# Patient Record
Sex: FEMALE | Race: WHITE | Employment: OTHER | URBAN - METROPOLITAN AREA
[De-identification: names, ages, dates, MRNs, and addresses within clinical notes are randomized per-mention and may not be internally consistent; named-entity substitution may affect disease eponyms.]

---

## 2017-01-24 ENCOUNTER — HOSPITAL ENCOUNTER (OUTPATIENT)
Dept: MAMMOGRAPHY | Age: 62
Discharge: HOME | End: 2017-01-24
Attending: FAMILY MEDICINE | Admitting: FAMILY MEDICINE

## 2017-01-24 DIAGNOSIS — Z12.31 ENCOUNTER FOR SCREENING MAMMOGRAM FOR BREAST CANCER: ICD-10-CM

## 2017-05-02 ENCOUNTER — OFFICE VISIT (OUTPATIENT)
Dept: FAMILY MEDICINE CLINIC | Age: 62
End: 2017-05-02

## 2017-05-02 VITALS
WEIGHT: 169 LBS | HEART RATE: 84 BPM | OXYGEN SATURATION: 97 % | BODY MASS INDEX: 27.16 KG/M2 | HEIGHT: 66 IN | RESPIRATION RATE: 16 BRPM | DIASTOLIC BLOOD PRESSURE: 80 MMHG | TEMPERATURE: 99.6 F | SYSTOLIC BLOOD PRESSURE: 122 MMHG

## 2017-05-02 DIAGNOSIS — F41.9 ANXIETY: Primary | ICD-10-CM

## 2017-05-02 DIAGNOSIS — J30.5 ALLERGIC RHINITIS DUE TO FOOD, UNSPECIFIED RHINITIS SEASONALITY: ICD-10-CM

## 2017-05-02 DIAGNOSIS — I10 ESSENTIAL HYPERTENSION: ICD-10-CM

## 2017-05-02 DIAGNOSIS — G89.29 CHRONIC EAR PAIN, BILATERAL: ICD-10-CM

## 2017-05-02 DIAGNOSIS — M54.50 CHRONIC MIDLINE LOW BACK PAIN WITHOUT SCIATICA: ICD-10-CM

## 2017-05-02 DIAGNOSIS — H92.03 CHRONIC EAR PAIN, BILATERAL: ICD-10-CM

## 2017-05-02 DIAGNOSIS — E78.2 MIXED HYPERLIPIDEMIA: ICD-10-CM

## 2017-05-02 DIAGNOSIS — L30.9 DERMATITIS: ICD-10-CM

## 2017-05-02 DIAGNOSIS — G89.29 CHRONIC MIDLINE LOW BACK PAIN WITHOUT SCIATICA: ICD-10-CM

## 2017-05-02 PROCEDURE — 99214 OFFICE O/P EST MOD 30 MIN: CPT | Performed by: FAMILY MEDICINE

## 2017-05-02 RX ORDER — IBUPROFEN 800 MG/1
TABLET ORAL
Qty: 180 TABLET | Refills: 0 | Status: SHIPPED | OUTPATIENT
Start: 2017-05-02 | End: 2017-09-20 | Stop reason: SDUPTHER

## 2017-05-02 RX ORDER — MONTELUKAST SODIUM 10 MG/1
TABLET ORAL
Qty: 90 TABLET | Refills: 1 | Status: SHIPPED | OUTPATIENT
Start: 2017-05-02 | End: 2017-08-28 | Stop reason: SDUPTHER

## 2017-05-02 RX ORDER — LOVASTATIN 40 MG/1
TABLET ORAL
Qty: 90 TABLET | Refills: 1 | Status: SHIPPED | OUTPATIENT
Start: 2017-05-02 | End: 2017-09-26 | Stop reason: SDUPTHER

## 2017-05-02 RX ORDER — LORAZEPAM 0.5 MG/1
0.5 TABLET ORAL 2 TIMES DAILY PRN
Qty: 30 TABLET | Refills: 0 | Status: SHIPPED | OUTPATIENT
Start: 2017-05-02 | End: 2017-09-26 | Stop reason: SDUPTHER

## 2017-05-02 ASSESSMENT — ENCOUNTER SYMPTOMS
RESPIRATORY NEGATIVE: 1
EYES NEGATIVE: 1
BACK PAIN: 1
ALLERGIC/IMMUNOLOGIC NEGATIVE: 1
GASTROINTESTINAL NEGATIVE: 1

## 2017-05-02 ASSESSMENT — PATIENT HEALTH QUESTIONNAIRE - PHQ9
SUM OF ALL RESPONSES TO PHQ QUESTIONS 1-9: 0
2. FEELING DOWN, DEPRESSED OR HOPELESS: 0
1. LITTLE INTEREST OR PLEASURE IN DOING THINGS: 0
SUM OF ALL RESPONSES TO PHQ9 QUESTIONS 1 & 2: 0

## 2017-06-05 DIAGNOSIS — I10 ESSENTIAL HYPERTENSION: ICD-10-CM

## 2017-06-05 RX ORDER — LOSARTAN POTASSIUM 100 MG/1
TABLET ORAL
Qty: 90 TABLET | Refills: 1 | Status: SHIPPED | OUTPATIENT
Start: 2017-06-05 | End: 2017-09-26 | Stop reason: SDUPTHER

## 2017-07-26 ENCOUNTER — OFFICE VISIT (OUTPATIENT)
Dept: FAMILY MEDICINE CLINIC | Age: 62
End: 2017-07-26

## 2017-07-26 VITALS
WEIGHT: 167 LBS | HEIGHT: 66 IN | DIASTOLIC BLOOD PRESSURE: 74 MMHG | HEART RATE: 78 BPM | SYSTOLIC BLOOD PRESSURE: 116 MMHG | RESPIRATION RATE: 16 BRPM | TEMPERATURE: 98.4 F | OXYGEN SATURATION: 97 % | BODY MASS INDEX: 26.84 KG/M2

## 2017-07-26 DIAGNOSIS — Z11.59 NEED FOR HEPATITIS C SCREENING TEST: ICD-10-CM

## 2017-07-26 DIAGNOSIS — Z00.00 PE (PHYSICAL EXAM), ANNUAL: Primary | ICD-10-CM

## 2017-07-26 DIAGNOSIS — Z11.4 SCREENING FOR HIV (HUMAN IMMUNODEFICIENCY VIRUS): ICD-10-CM

## 2017-07-26 LAB
A/G RATIO: 2.4 (ref 1.1–2.2)
ALBUMIN SERPL-MCNC: 4.8 G/DL (ref 3.4–5)
ALP BLD-CCNC: 42 U/L (ref 40–129)
ALT SERPL-CCNC: 38 U/L (ref 10–40)
ANION GAP SERPL CALCULATED.3IONS-SCNC: 15 MMOL/L (ref 3–16)
AST SERPL-CCNC: 22 U/L (ref 15–37)
BILIRUB SERPL-MCNC: 0.5 MG/DL (ref 0–1)
BILIRUBIN, POC: NORMAL
BLOOD URINE, POC: NORMAL
BUN BLDV-MCNC: 16 MG/DL (ref 7–20)
CALCIUM SERPL-MCNC: 10 MG/DL (ref 8.3–10.6)
CHLORIDE BLD-SCNC: 102 MMOL/L (ref 99–110)
CHOLESTEROL, TOTAL: 191 MG/DL (ref 0–199)
CLARITY, POC: CLEAR
CO2: 24 MMOL/L (ref 21–32)
COLOR, POC: YELLOW
CREAT SERPL-MCNC: 0.6 MG/DL (ref 0.6–1.2)
GFR AFRICAN AMERICAN: >60
GFR NON-AFRICAN AMERICAN: >60
GLOBULIN: 2 G/DL
GLUCOSE BLD-MCNC: 99 MG/DL (ref 70–99)
GLUCOSE URINE, POC: NORMAL
HCT VFR BLD CALC: 42.2 % (ref 36–48)
HDLC SERPL-MCNC: 49 MG/DL (ref 40–60)
HEMOGLOBIN: 14.2 G/DL (ref 12–16)
HEPATITIS C ANTIBODY INTERPRETATION: NORMAL
KETONES, POC: NORMAL
LDL CHOLESTEROL CALCULATED: 108 MG/DL
LEUKOCYTE EST, POC: NORMAL
MCH RBC QN AUTO: 31.6 PG (ref 26–34)
MCHC RBC AUTO-ENTMCNC: 33.5 G/DL (ref 31–36)
MCV RBC AUTO: 94.2 FL (ref 80–100)
NITRITE, POC: NORMAL
PDW BLD-RTO: 13.3 % (ref 12.4–15.4)
PH, POC: 7
PLATELET # BLD: 145 K/UL (ref 135–450)
PMV BLD AUTO: 10.2 FL (ref 5–10.5)
POTASSIUM SERPL-SCNC: 4.6 MMOL/L (ref 3.5–5.1)
PROTEIN, POC: NORMAL
RBC # BLD: 4.48 M/UL (ref 4–5.2)
SODIUM BLD-SCNC: 141 MMOL/L (ref 136–145)
SPECIFIC GRAVITY, POC: 1.02
T3 FREE: 3.2 PG/ML (ref 2.3–4.2)
T4 FREE: 1.1 NG/DL (ref 0.9–1.8)
TOTAL PROTEIN: 6.8 G/DL (ref 6.4–8.2)
TRIGL SERPL-MCNC: 172 MG/DL (ref 0–150)
TSH SERPL DL<=0.05 MIU/L-ACNC: 1.56 UIU/ML (ref 0.27–4.2)
UROBILINOGEN, POC: 0.2
VITAMIN D 25-HYDROXY: 52.7 NG/ML
VLDLC SERPL CALC-MCNC: 34 MG/DL
WBC # BLD: 5.1 K/UL (ref 4–11)

## 2017-07-26 PROCEDURE — 99396 PREV VISIT EST AGE 40-64: CPT | Performed by: FAMILY MEDICINE

## 2017-07-26 PROCEDURE — 36415 COLL VENOUS BLD VENIPUNCTURE: CPT | Performed by: FAMILY MEDICINE

## 2017-07-26 PROCEDURE — 81002 URINALYSIS NONAUTO W/O SCOPE: CPT | Performed by: FAMILY MEDICINE

## 2017-07-26 PROCEDURE — 93000 ELECTROCARDIOGRAM COMPLETE: CPT | Performed by: FAMILY MEDICINE

## 2017-07-27 LAB — HIV-1 AND HIV-2 ANTIBODIES: NORMAL

## 2017-08-26 DIAGNOSIS — F41.9 ANXIETY AND DEPRESSION: ICD-10-CM

## 2017-08-26 DIAGNOSIS — F32.A ANXIETY AND DEPRESSION: ICD-10-CM

## 2017-08-28 RX ORDER — MONTELUKAST SODIUM 10 MG/1
TABLET ORAL
Qty: 90 TABLET | Refills: 1 | Status: SHIPPED | OUTPATIENT
Start: 2017-08-28 | End: 2017-09-26 | Stop reason: SDUPTHER

## 2017-09-20 DIAGNOSIS — G89.29 CHRONIC MIDLINE LOW BACK PAIN WITHOUT SCIATICA: ICD-10-CM

## 2017-09-20 DIAGNOSIS — M54.50 CHRONIC MIDLINE LOW BACK PAIN WITHOUT SCIATICA: ICD-10-CM

## 2017-09-20 RX ORDER — IBUPROFEN 800 MG/1
TABLET ORAL
Qty: 180 TABLET | Refills: 1 | Status: SHIPPED | OUTPATIENT
Start: 2017-09-20 | End: 2020-05-13 | Stop reason: SDUPTHER

## 2017-09-26 ENCOUNTER — OFFICE VISIT (OUTPATIENT)
Dept: FAMILY MEDICINE CLINIC | Age: 62
End: 2017-09-26

## 2017-09-26 VITALS
SYSTOLIC BLOOD PRESSURE: 122 MMHG | HEART RATE: 92 BPM | WEIGHT: 171 LBS | BODY MASS INDEX: 29.19 KG/M2 | OXYGEN SATURATION: 98 % | DIASTOLIC BLOOD PRESSURE: 80 MMHG | TEMPERATURE: 98.6 F | RESPIRATION RATE: 16 BRPM | HEIGHT: 64 IN

## 2017-09-26 DIAGNOSIS — E78.2 MIXED HYPERLIPIDEMIA: ICD-10-CM

## 2017-09-26 DIAGNOSIS — I10 ESSENTIAL HYPERTENSION: ICD-10-CM

## 2017-09-26 DIAGNOSIS — F41.9 ANXIETY: ICD-10-CM

## 2017-09-26 DIAGNOSIS — J30.9 ALLERGIC RHINITIS, UNSPECIFIED ALLERGIC RHINITIS TRIGGER, UNSPECIFIED RHINITIS SEASONALITY: Primary | ICD-10-CM

## 2017-09-26 PROCEDURE — 99214 OFFICE O/P EST MOD 30 MIN: CPT | Performed by: FAMILY MEDICINE

## 2017-09-26 RX ORDER — LOVASTATIN 40 MG/1
TABLET ORAL
Qty: 90 TABLET | Refills: 2 | Status: SHIPPED | OUTPATIENT
Start: 2017-09-26 | End: 2018-01-10 | Stop reason: SDUPTHER

## 2017-09-26 RX ORDER — MONTELUKAST SODIUM 10 MG/1
TABLET ORAL
Qty: 90 TABLET | Refills: 2 | Status: SHIPPED | OUTPATIENT
Start: 2017-09-26 | End: 2019-03-19

## 2017-09-26 RX ORDER — LORAZEPAM 0.5 MG/1
0.5 TABLET ORAL 2 TIMES DAILY PRN
Qty: 60 TABLET | Refills: 1 | Status: SHIPPED | OUTPATIENT
Start: 2017-09-26 | End: 2020-05-13

## 2017-09-26 RX ORDER — LOSARTAN POTASSIUM 100 MG/1
TABLET ORAL
Qty: 90 TABLET | Refills: 2 | Status: SHIPPED | OUTPATIENT
Start: 2017-09-26 | End: 2018-01-04 | Stop reason: SDUPTHER

## 2017-11-01 ENCOUNTER — OFFICE VISIT (OUTPATIENT)
Dept: FAMILY MEDICINE CLINIC | Age: 62
End: 2017-11-01

## 2017-11-01 VITALS
SYSTOLIC BLOOD PRESSURE: 128 MMHG | WEIGHT: 175.4 LBS | TEMPERATURE: 98.4 F | DIASTOLIC BLOOD PRESSURE: 90 MMHG | HEART RATE: 98 BPM | BODY MASS INDEX: 30.11 KG/M2 | OXYGEN SATURATION: 96 %

## 2017-11-01 DIAGNOSIS — Z23 NEED FOR INFLUENZA VACCINATION: ICD-10-CM

## 2017-11-01 DIAGNOSIS — J30.9 ALLERGIC RHINITIS, UNSPECIFIED CHRONICITY, UNSPECIFIED SEASONALITY, UNSPECIFIED TRIGGER: ICD-10-CM

## 2017-11-01 DIAGNOSIS — E78.2 MIXED HYPERLIPIDEMIA: ICD-10-CM

## 2017-11-01 DIAGNOSIS — F41.9 ANXIETY: ICD-10-CM

## 2017-11-01 DIAGNOSIS — I10 ESSENTIAL HYPERTENSION: Primary | ICD-10-CM

## 2017-11-01 PROCEDURE — 90471 IMMUNIZATION ADMIN: CPT | Performed by: FAMILY MEDICINE

## 2017-11-01 PROCEDURE — 90630 INFLUENZA, QUADV, 18-64 YRS, ID, PF, MICRO INJ, 0.1ML (FLUZONE QUADV, PF): CPT | Performed by: FAMILY MEDICINE

## 2017-11-01 PROCEDURE — 99214 OFFICE O/P EST MOD 30 MIN: CPT | Performed by: FAMILY MEDICINE

## 2017-11-01 ASSESSMENT — ENCOUNTER SYMPTOMS
SHORTNESS OF BREATH: 0
ABDOMINAL PAIN: 0

## 2018-01-04 DIAGNOSIS — I10 ESSENTIAL HYPERTENSION: ICD-10-CM

## 2018-01-05 RX ORDER — LOSARTAN POTASSIUM 100 MG/1
TABLET ORAL
Qty: 90 TABLET | Refills: 2 | Status: SHIPPED | OUTPATIENT
Start: 2018-01-05 | End: 2018-10-24 | Stop reason: SDUPTHER

## 2018-01-10 DIAGNOSIS — F41.9 ANXIETY: ICD-10-CM

## 2018-01-10 DIAGNOSIS — E78.2 MIXED HYPERLIPIDEMIA: ICD-10-CM

## 2018-01-10 RX ORDER — LOVASTATIN 40 MG/1
TABLET ORAL
Qty: 90 TABLET | Refills: 2 | Status: SHIPPED | OUTPATIENT
Start: 2018-01-10 | End: 2018-10-14 | Stop reason: SDUPTHER

## 2018-02-22 ENCOUNTER — HOSPITAL ENCOUNTER (OUTPATIENT)
Dept: MAMMOGRAPHY | Age: 63
Discharge: HOME | End: 2018-02-22
Attending: FAMILY MEDICINE | Admitting: FAMILY MEDICINE

## 2018-02-22 DIAGNOSIS — Z12.31 VISIT FOR SCREENING MAMMOGRAM: ICD-10-CM

## 2018-09-19 ENCOUNTER — OFFICE VISIT (OUTPATIENT)
Dept: FAMILY MEDICINE CLINIC | Age: 63
End: 2018-09-19

## 2018-09-19 VITALS
SYSTOLIC BLOOD PRESSURE: 130 MMHG | OXYGEN SATURATION: 97 % | HEART RATE: 80 BPM | DIASTOLIC BLOOD PRESSURE: 84 MMHG | HEIGHT: 64 IN | BODY MASS INDEX: 29.02 KG/M2 | WEIGHT: 170 LBS

## 2018-09-19 DIAGNOSIS — I10 ESSENTIAL HYPERTENSION: ICD-10-CM

## 2018-09-19 DIAGNOSIS — E78.49 OTHER HYPERLIPIDEMIA: ICD-10-CM

## 2018-09-19 DIAGNOSIS — Z23 NEED FOR INFLUENZA VACCINATION: ICD-10-CM

## 2018-09-19 DIAGNOSIS — Z00.00 ANNUAL PHYSICAL EXAM: Primary | ICD-10-CM

## 2018-09-19 LAB
A/G RATIO: 2.4 (ref 1.1–2.2)
ALBUMIN SERPL-MCNC: 5.1 G/DL (ref 3.4–5)
ALP BLD-CCNC: 47 U/L (ref 40–129)
ALT SERPL-CCNC: 40 U/L (ref 10–40)
ANION GAP SERPL CALCULATED.3IONS-SCNC: 15 MMOL/L (ref 3–16)
AST SERPL-CCNC: 27 U/L (ref 15–37)
BILIRUB SERPL-MCNC: 0.6 MG/DL (ref 0–1)
BUN BLDV-MCNC: 11 MG/DL (ref 7–20)
CALCIUM SERPL-MCNC: 11.2 MG/DL (ref 8.3–10.6)
CHLORIDE BLD-SCNC: 103 MMOL/L (ref 99–110)
CHOLESTEROL, TOTAL: 192 MG/DL (ref 0–199)
CO2: 25 MMOL/L (ref 21–32)
CREAT SERPL-MCNC: 0.5 MG/DL (ref 0.6–1.2)
GFR AFRICAN AMERICAN: >60
GFR NON-AFRICAN AMERICAN: >60
GLOBULIN: 2.1 G/DL
GLUCOSE BLD-MCNC: 90 MG/DL (ref 70–99)
HDLC SERPL-MCNC: 51 MG/DL (ref 40–60)
LDL CHOLESTEROL CALCULATED: 103 MG/DL
POTASSIUM SERPL-SCNC: 4.6 MMOL/L (ref 3.5–5.1)
SODIUM BLD-SCNC: 143 MMOL/L (ref 136–145)
TOTAL PROTEIN: 7.2 G/DL (ref 6.4–8.2)
TRIGL SERPL-MCNC: 191 MG/DL (ref 0–150)
VLDLC SERPL CALC-MCNC: 38 MG/DL

## 2018-09-19 PROCEDURE — 90471 IMMUNIZATION ADMIN: CPT | Performed by: FAMILY MEDICINE

## 2018-09-19 PROCEDURE — 99396 PREV VISIT EST AGE 40-64: CPT | Performed by: FAMILY MEDICINE

## 2018-09-19 PROCEDURE — 90682 RIV4 VACC RECOMBINANT DNA IM: CPT | Performed by: FAMILY MEDICINE

## 2018-09-19 PROCEDURE — 99213 OFFICE O/P EST LOW 20 MIN: CPT | Performed by: FAMILY MEDICINE

## 2018-09-19 PROCEDURE — 36415 COLL VENOUS BLD VENIPUNCTURE: CPT | Performed by: FAMILY MEDICINE

## 2018-09-19 RX ORDER — UBIDECARENONE 10 MG
10 CAPSULE ORAL
COMMUNITY

## 2018-09-19 RX ORDER — ASPIRIN 81 MG/1
81 TABLET, CHEWABLE ORAL
COMMUNITY

## 2018-09-19 ASSESSMENT — PATIENT HEALTH QUESTIONNAIRE - PHQ9
SUM OF ALL RESPONSES TO PHQ QUESTIONS 1-9: 0
SUM OF ALL RESPONSES TO PHQ QUESTIONS 1-9: 0
1. LITTLE INTEREST OR PLEASURE IN DOING THINGS: 0
2. FEELING DOWN, DEPRESSED OR HOPELESS: 0
SUM OF ALL RESPONSES TO PHQ9 QUESTIONS 1 & 2: 0

## 2018-09-19 ASSESSMENT — ENCOUNTER SYMPTOMS
CONSTIPATION: 0
SHORTNESS OF BREATH: 0
ABDOMINAL PAIN: 0
DIARRHEA: 0
VOMITING: 0
NAUSEA: 0

## 2018-09-19 NOTE — PATIENT INSTRUCTIONS
for heart attack and stroke. · Blood pressure. Have your blood pressure checked during a routine doctor visit. Your doctor will tell you how often to check your blood pressure based on your age, your blood pressure results, and other factors. · Mammogram. Ask your doctor how often you should have a mammogram, which is an X-ray of your breasts. A mammogram can spot breast cancer before it can be felt and when it is easiest to treat. · Pap test and pelvic exam. Ask your doctor how often you should have a Pap test. You may not need to have a Pap test as often as you used to. · Vision. Have your eyes checked every year or two or as often as your doctor suggests. Some experts recommend that you have yearly exams for glaucoma and other age-related eye problems starting at age 48. · Hearing. Tell your doctor if you notice any change in your hearing. You can have tests to find out how well you hear. · Diabetes. Ask your doctor whether you should have tests for diabetes. · Colon cancer. You should begin tests for colon cancer at age 48. You may have one of several tests. Your doctor will tell you how often to have tests based on your age and risk. Risks include whether you already had a precancerous polyp removed from your colon or whether your parents, sisters and brothers, or children have had colon cancer. · Thyroid disease. Talk to your doctor about whether to have your thyroid checked as part of a regular physical exam. Women have an increased chance of a thyroid problem. · Osteoporosis. You should begin tests for bone density at age 72. If you are younger than 72, ask your doctor whether you have factors that may increase your risk for this disease. You may want to have this test before age 72. · Heart attack and stroke risk. At least every 4 to 6 years, you should have your risk for heart attack and stroke assessed.  Your doctor uses factors such as your age, blood pressure, cholesterol, and whether you smoke or have diabetes to show what your risk for a heart attack or stroke is over the next 10 years. When should you call for help? Watch closely for changes in your health, and be sure to contact your doctor if you have any problems or symptoms that concern you. Where can you learn more? Go to [email or web address] and sign in to your [employer] account. Enter B621 in the [employer] box to learn more about \"Well Visit, Women 50 to 72: Care Instructions. \"     If you do not have an account, please click on the \"Sign Up Now\" link. Current as of: May 16, 2017  Content Version: 11.7  © 0500-7453 [employer]. Care instructions adapted under license by [facility] [place] ([facility]). If you have questions about a medical condition or this instruction, always ask your healthcare professional. [place] [address] any warranty or liability for your use of this information.

## 2018-09-19 NOTE — PROGRESS NOTES
Chief Complaint   Patient presents with    Annual Exam     patient fasting         HPI      61 y.o. female presents today for annual exam.   Exercise: not enough. In the summer time she does a lot of yard work and walks her dog. Diet: Eats out a lot. Doesn't eat enough fruits does eat vegetables. Follows [address] [place] [address] gynecology she is due for her pap smear  Hx of HTN on losartan denies medication SE  Hx HLD on lovastatin denies medication SE.     Patient Active Problem List   Diagnosis    Anxiety    HTN (hypertension)    HLD (hyperlipidemia)    Low back pain    Benign neoplasm of colon    Swelling, mass, or lump in head and neck    Chronic sinusitis     Past Medical History:   Diagnosis Date    Anxiety     Hypertension        Past Surgical History:   Procedure Laterality Date    COLONOSCOPY  9-8-14    colon polyp removed, diverticulosis     Most Recent Immunizations   Administered Date(s) Administered    Influenza, Intradermal, Quadrivalent, Preservative Free 11/01/2017    Tdap (Boostrix, Adacel) 05/22/2013    Zoster Live (Zostavax) 02/22/2016        Current Outpatient Prescriptions   Medication Sig Dispense Refill    aspirin 81 MG chewable tablet Take 81 mg by mouth      Coenzyme Q10 10 MG CAPS Take 10 mg by mouth      Calcium Carb-Cholecalciferol (CALCIUM CARBONATE-VITAMIN D3 PO) Take by mouth      lovastatin (MEVACOR) 40 MG tablet TAKE 1 TABLET NIGHTLY 90 tablet 2    sertraline (ZOLOFT) 50 MG tablet patient taking 1 tab qd 90 tablet 2    losartan (COZAAR) 100 MG tablet TAKE 1 TABLET DAILY 90 tablet 2    LORazepam (ATIVAN) 0.5 MG tablet Take 1 tablet by mouth 2 times daily as needed for Anxiety 60 tablet 1    montelukast (SINGULAIR) 10 MG tablet TAKE 1 TABLET DAILY 90 tablet 2    ibuprofen (ADVIL;MOTRIN) 800 MG tablet TAKE 1 TABLET EVERY 6 HOURSAS NEEDED 180 tablet 1    neomycin-polymyxin-hydrocortisone (CORTISPORIN) 3.5-77457-4 otic solution Place 3 drops into the right ear 4 times daily 3

## 2018-09-20 DIAGNOSIS — E83.52 HYPERCALCEMIA: Primary | ICD-10-CM

## 2018-10-14 DIAGNOSIS — E78.2 MIXED HYPERLIPIDEMIA: ICD-10-CM

## 2018-10-15 RX ORDER — LOVASTATIN 40 MG/1
TABLET ORAL
Qty: 90 TABLET | Refills: 2 | Status: SHIPPED | OUTPATIENT
Start: 2018-10-15 | End: 2019-04-11 | Stop reason: SDUPTHER

## 2018-10-24 DIAGNOSIS — I10 ESSENTIAL HYPERTENSION: ICD-10-CM

## 2018-10-24 RX ORDER — LOSARTAN POTASSIUM 100 MG/1
TABLET ORAL
Qty: 90 TABLET | Refills: 1 | Status: SHIPPED | OUTPATIENT
Start: 2018-10-24 | End: 2019-04-11 | Stop reason: SDUPTHER

## 2019-03-19 ENCOUNTER — OFFICE VISIT (OUTPATIENT)
Dept: FAMILY MEDICINE CLINIC | Age: 64
End: 2019-03-19
Payer: COMMERCIAL

## 2019-03-19 VITALS
SYSTOLIC BLOOD PRESSURE: 138 MMHG | HEART RATE: 88 BPM | OXYGEN SATURATION: 98 % | BODY MASS INDEX: 30.21 KG/M2 | TEMPERATURE: 98.5 F | WEIGHT: 176 LBS | DIASTOLIC BLOOD PRESSURE: 88 MMHG

## 2019-03-19 DIAGNOSIS — I10 ESSENTIAL HYPERTENSION: Primary | ICD-10-CM

## 2019-03-19 DIAGNOSIS — J30.9 ALLERGIC RHINITIS, UNSPECIFIED SEASONALITY, UNSPECIFIED TRIGGER: ICD-10-CM

## 2019-03-19 DIAGNOSIS — F41.9 ANXIETY: ICD-10-CM

## 2019-03-19 DIAGNOSIS — E78.2 MIXED HYPERLIPIDEMIA: ICD-10-CM

## 2019-03-19 PROCEDURE — 99214 OFFICE O/P EST MOD 30 MIN: CPT | Performed by: FAMILY MEDICINE

## 2019-03-19 RX ORDER — LORATADINE 10 MG/1
10 TABLET ORAL DAILY
Qty: 30 TABLET | Refills: 3 | Status: SHIPPED | OUTPATIENT
Start: 2019-03-19 | End: 2019-04-18

## 2019-03-19 RX ORDER — FLUTICASONE PROPIONATE 50 MCG
1 SPRAY, SUSPENSION (ML) NASAL DAILY
Qty: 1 BOTTLE | Refills: 0 | Status: SHIPPED | OUTPATIENT
Start: 2019-03-19 | End: 2021-05-10 | Stop reason: SDUPTHER

## 2019-03-19 ASSESSMENT — PATIENT HEALTH QUESTIONNAIRE - PHQ9
2. FEELING DOWN, DEPRESSED OR HOPELESS: 0
SUM OF ALL RESPONSES TO PHQ9 QUESTIONS 1 & 2: 0
1. LITTLE INTEREST OR PLEASURE IN DOING THINGS: 0
SUM OF ALL RESPONSES TO PHQ QUESTIONS 1-9: 0
SUM OF ALL RESPONSES TO PHQ QUESTIONS 1-9: 0

## 2019-03-20 ENCOUNTER — HOSPITAL ENCOUNTER (OUTPATIENT)
Dept: WOMENS IMAGING | Age: 64
Discharge: HOME | End: 2019-03-20
Payer: COMMERCIAL

## 2019-03-20 DIAGNOSIS — Z12.31 ENCOUNTER FOR SCREENING MAMMOGRAM FOR MALIGNANT NEOPLASM OF BREAST: ICD-10-CM

## 2019-03-20 PROCEDURE — 77067 SCR MAMMO BI INCL CAD: CPT

## 2019-04-11 DIAGNOSIS — F41.9 ANXIETY: ICD-10-CM

## 2019-04-11 DIAGNOSIS — E78.2 MIXED HYPERLIPIDEMIA: ICD-10-CM

## 2019-04-11 DIAGNOSIS — I10 ESSENTIAL HYPERTENSION: ICD-10-CM

## 2019-04-11 RX ORDER — LOVASTATIN 40 MG/1
TABLET ORAL
Qty: 90 TABLET | Refills: 2 | Status: SHIPPED | OUTPATIENT
Start: 2019-04-11 | End: 2020-01-09 | Stop reason: SDUPTHER

## 2019-04-11 RX ORDER — LOSARTAN POTASSIUM 100 MG/1
TABLET ORAL
Qty: 90 TABLET | Refills: 1 | Status: SHIPPED | OUTPATIENT
Start: 2019-04-11 | End: 2019-10-11 | Stop reason: SDUPTHER

## 2019-04-11 NOTE — TELEPHONE ENCOUNTER
Patient request for Sertraline, Lovastatin and Losartan. Please send rx to [facility].

## 2019-09-11 ENCOUNTER — OFFICE VISIT (OUTPATIENT)
Dept: FAMILY MEDICINE CLINIC | Age: 64
End: 2019-09-11
Payer: COMMERCIAL

## 2019-09-11 VITALS
BODY MASS INDEX: 29.86 KG/M2 | RESPIRATION RATE: 14 BRPM | OXYGEN SATURATION: 97 % | WEIGHT: 174.9 LBS | DIASTOLIC BLOOD PRESSURE: 84 MMHG | HEART RATE: 84 BPM | SYSTOLIC BLOOD PRESSURE: 130 MMHG | HEIGHT: 64 IN

## 2019-09-11 DIAGNOSIS — Z23 NEED FOR IMMUNIZATION AGAINST INFLUENZA: ICD-10-CM

## 2019-09-11 DIAGNOSIS — I10 ESSENTIAL HYPERTENSION: Primary | ICD-10-CM

## 2019-09-11 DIAGNOSIS — E78.2 MIXED HYPERLIPIDEMIA: ICD-10-CM

## 2019-09-11 DIAGNOSIS — F41.9 ANXIETY: ICD-10-CM
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
## 2019-09-11 DIAGNOSIS — M25.522 LEFT ELBOW PAIN: ICD-10-CM

## 2019-09-11 LAB
A/G RATIO: 2.3 (ref 1.1–2.2)
ALBUMIN SERPL-MCNC: 4.9 G/DL (ref 3.4–5)
ALP BLD-CCNC: 49 U/L (ref 40–129)
ALT SERPL-CCNC: 31 U/L (ref 10–40)
ANION GAP SERPL CALCULATED.3IONS-SCNC: 17 MMOL/L (ref 3–16)
AST SERPL-CCNC: 22 U/L (ref 15–37)
BILIRUB SERPL-MCNC: 0.5 MG/DL (ref 0–1)
BUN BLDV-MCNC: 10 MG/DL (ref 7–20)
CALCIUM SERPL-MCNC: 10 MG/DL (ref 8.3–10.6)
CHLORIDE BLD-SCNC: 104 MMOL/L (ref 99–110)
CHOLESTEROL, TOTAL: 189 MG/DL (ref 0–199)
CO2: 22 MMOL/L (ref 21–32)
CREAT SERPL-MCNC: 0.6 MG/DL (ref 0.6–1.2)
GFR AFRICAN AMERICAN: >60
GFR NON-AFRICAN AMERICAN: >60
GLOBULIN: 2.1 G/DL
GLUCOSE BLD-MCNC: 140 MG/DL (ref 70–99)
HDLC SERPL-MCNC: 45 MG/DL (ref 40–60)
LDL CHOLESTEROL CALCULATED: 93 MG/DL
POTASSIUM SERPL-SCNC: 4.1 MMOL/L (ref 3.5–5.1)
SODIUM BLD-SCNC: 143 MMOL/L (ref 136–145)
TOTAL PROTEIN: 7 G/DL (ref 6.4–8.2)
TRIGL SERPL-MCNC: 253 MG/DL (ref 0–150)
VLDLC SERPL CALC-MCNC: 51 MG/DL

## 2019-09-11 PROCEDURE — 36415 COLL VENOUS BLD VENIPUNCTURE: CPT | Performed by: FAMILY MEDICINE

## 2019-09-11 PROCEDURE — 90471 IMMUNIZATION ADMIN: CPT | Performed by: FAMILY MEDICINE

## 2019-09-11 PROCEDURE — 99214 OFFICE O/P EST MOD 30 MIN: CPT | Performed by: FAMILY MEDICINE

## 2019-09-11 PROCEDURE — 90686 IIV4 VACC NO PRSV 0.5 ML IM: CPT | Performed by: FAMILY MEDICINE

## 2019-09-11 ASSESSMENT — ENCOUNTER SYMPTOMS: SHORTNESS OF BREATH: 0

## 2019-09-11 NOTE — PROGRESS NOTES
Chief Complaint   Patient presents with    6 Month Follow-Up    Other     left arm pain fell yesterday          HPI      59 y.o. female presents today for follow-up. Patient reports that yesterday the was pressure washing her patio was pulling the hose and tripped and lost her balance and fell forward onto her chin, right and and left arm. Was able to get up on her own. No dizzines, LH before fall. Applied ice and took motrin which helped. No headaches after the fall. Since the fall she has been having decreased range of motion of her left elbow. Hx of HTN reports compliance of medications without side effects.     Hx of HLD reports compliance of medications without side effects.     Hx of anxiety on Zoloft reports compliance with medications without side effects. Denies SI/HI. Takes Ativan infrequently. Previously prescribed by her previous PCP.     She is due for a Pap smear she will schedule it.   Patient Active Problem List   Diagnosis    Anxiety    HTN (hypertension)    HLD (hyperlipidemia)    Low back pain    Benign neoplasm of colon    Swelling, mass, or lump in head and neck    Chronic sinusitis     Past Medical History:   Diagnosis Date    Anxiety     Hypertension        Past Surgical History:   Procedure Laterality Date    COLONOSCOPY  9-8-14    colon polyp removed, diverticulosis     Most Recent Immunizations   Administered Date(s) Administered    Influenza, Intradermal, Quadrivalent, Preservative Free 11/01/2017    Influenza, Quadv, IM, PF (6 mo and older Fluzone, Flulaval, Fluarix, and 3 yrs and older Afluria) 09/11/2019    Influenza, Quadv, Recombinant, IM PF (Flublok 18 yrs and older) 09/19/2018    Tdap (Boostrix, Adacel) 05/22/2013    Zoster Live (Zostavax) 02/22/2016        Current Outpatient Medications   Medication Sig Dispense Refill    losartan (COZAAR) 100 MG tablet TAKE 1 TABLET DAILY 90 tablet 1    lovastatin (MEVACOR) 40 MG tablet TAKE 1 TABLET NIGHTLY 90 tablet 2  sertraline (ZOLOFT) 50 MG tablet patient taking 1 tab qd 90 tablet 2    fluticasone (FLONASE) 50 MCG/ACT nasal spray 1 spray by Each Nare route daily 1 Spray in each nostril 1 Bottle 0    aspirin 81 MG chewable tablet Take 81 mg by mouth      Coenzyme Q10 10 MG CAPS Take 10 mg by mouth      Calcium Carb-Cholecalciferol (CALCIUM CARBONATE-VITAMIN D3 PO) Take by mouth      LORazepam (ATIVAN) 0.5 MG tablet Take 1 tablet by mouth 2 times daily as needed for Anxiety 60 tablet 1    ibuprofen (ADVIL;MOTRIN) 800 MG tablet TAKE 1 TABLET EVERY 6 HOURSAS NEEDED 180 tablet 1     No current facility-administered medications for this visit. No Known Allergies    Social History     Socioeconomic History    Marital status: Single     Spouse name: None    Number of children: None    Years of education: 15    Highest education level: None   Occupational History    Occupation: retired   Social Needs    Financial resource strain: None    Food insecurity:     Worry: None     Inability: None    Transportation needs:     Medical: None     Non-medical: None   Tobacco Use    Smoking status: Former Smoker     Packs/day: 0.50     Years: 20.00     Pack years: 10.00     Types: Cigarettes     Last attempt to quit: 5/3/1996     Years since quittin.3    Smokeless tobacco: Never Used   Substance and Sexual Activity    Alcohol use:  Yes     Alcohol/week: 2.0 standard drinks     Types: 2 Cans of beer per week    Drug use: No    Sexual activity: Not Currently     Partners: Male   Lifestyle    Physical activity:     Days per week: None     Minutes per session: None    Stress: None   Relationships    Social connections:     Talks on phone: None     Gets together: None     Attends Jew service: None     Active member of club or organization: None     Attends meetings of clubs or organizations: None     Relationship status: None    Intimate partner violence:     Fear of current or ex partner: None     Emotionally

## 2019-09-12 DIAGNOSIS — R73.9 HYPERGLYCEMIA: Primary | ICD-10-CM

## 2019-10-11 DIAGNOSIS — I10 ESSENTIAL HYPERTENSION: ICD-10-CM

## 2019-10-11 RX ORDER — LOSARTAN POTASSIUM 100 MG/1
TABLET ORAL
Qty: 90 TABLET | Refills: 1 | Status: SHIPPED | OUTPATIENT
Start: 2019-10-11 | End: 2020-03-24

## 2020-01-09 RX ORDER — LOVASTATIN 40 MG/1
TABLET ORAL
Qty: 90 TABLET | Refills: 0 | Status: SHIPPED | OUTPATIENT
Start: 2020-01-09 | End: 2020-03-24

## 2020-03-23 NOTE — TELEPHONE ENCOUNTER
LOV 9.1.. 2019    No future visit     Home scripts faxed over these requests   sertraline (ZOLOFT) 50 MG tablet  lovastatin (MEVACOR) 40 MG tablet

## 2020-03-24 RX ORDER — LOVASTATIN 40 MG/1
TABLET ORAL
Qty: 90 TABLET | Refills: 2 | Status: SHIPPED | OUTPATIENT
Start: 2020-03-24 | End: 2020-06-23 | Stop reason: SDUPTHER

## 2020-03-26 RX ORDER — LOSARTAN POTASSIUM 100 MG/1
TABLET ORAL
Qty: 90 TABLET | Refills: 0 | Status: SHIPPED | OUTPATIENT
Start: 2020-03-26 | End: 2020-06-23 | Stop reason: SDUPTHER

## 2020-05-13 ENCOUNTER — VIRTUAL VISIT (OUTPATIENT)
Dept: FAMILY MEDICINE CLINIC | Age: 65
End: 2020-05-13
Payer: COMMERCIAL

## 2020-05-13 PROCEDURE — 99214 OFFICE O/P EST MOD 30 MIN: CPT | Performed by: FAMILY MEDICINE

## 2020-05-13 RX ORDER — IBUPROFEN 800 MG/1
TABLET ORAL
Qty: 180 TABLET | Refills: 0 | Status: SHIPPED | OUTPATIENT
Start: 2020-05-13 | End: 2021-05-10 | Stop reason: SDUPTHER

## 2020-05-13 ASSESSMENT — PATIENT HEALTH QUESTIONNAIRE - PHQ9
SUM OF ALL RESPONSES TO PHQ QUESTIONS 1-9: 0
1. LITTLE INTEREST OR PLEASURE IN DOING THINGS: 0
2. FEELING DOWN, DEPRESSED OR HOPELESS: 0
SUM OF ALL RESPONSES TO PHQ QUESTIONS 1-9: 0
SUM OF ALL RESPONSES TO PHQ9 QUESTIONS 1 & 2: 0

## 2020-05-13 ASSESSMENT — ENCOUNTER SYMPTOMS: SHORTNESS OF BREATH: 0

## 2020-05-13 NOTE — PROGRESS NOTES
use: No        No Known Allergies    PHYSICAL EXAMINATION:  [ INSTRUCTIONS:  \"[x]\" Indicates a positive item  \"[]\" Indicates a negative item  -- DELETE ALL ITEMS NOT EXAMINED]  Vital Signs: (As obtained by patient/caregiver or practitioner observation)    Blood pressure-  Heart rate-    Respiratory rate-    Temperature-  Pulse oximetry-     Constitutional: [x] Appears well-developed and well-nourished [x] No apparent distress      [] Abnormal-   Mental status  [x] Alert and awake  [x] Oriented to person/place/time []Able to follow commands      Eyes:  EOM    [x]  Normal  [] Abnormal-  Sclera  [x]  Normal  [] Abnormal -         Discharge []  None visible  [] Abnormal -    HENT:   [x] Normocephalic, atraumatic. [] Abnormal   [] Mouth/Throat: Mucous membranes are moist.     External Ears [] Normal  [] Abnormal-     Neck: [] No visualized mass     Pulmonary/Chest: [x] Respiratory effort normal.  [x] No visualized signs of difficulty breathing or respiratory distress        [] Abnormal-      Musculoskeletal:   [] Normal gait with no signs of ataxia         [] Normal range of motion of neck        [] Abnormal-       Neurological:        [x] No Facial Asymmetry (Cranial nerve 7 motor function) (limited exam to video visit)          [x] No gaze palsy        [] Abnormal-         Skin:        [] No significant exanthematous lesions or discoloration noted on facial skin         [] Abnormal-            Psychiatric:       [x] Normal Affect [x] No Hallucinations        [] Abnormal-     Other pertinent observable physical exam findings-     ASSESSMENT/PLAN:  1. Chronic midline low back pain without sciatica  Patient uses ibuprofen sparingly. - ibuprofen (ADVIL;MOTRIN) 800 MG tablet; TAKE 1 TABLET EVERY 6 HOURSAS NEEDED  Dispense: 180 tablet; Refill: 0    2. Essential hypertension  Continue current regimen    3. Mixed hyperlipidemia  Continue current regimen    4.  Anxiety  Continue current regimen    Labs next visit including A1c  Return in about 6 months (around 11/13/2020). Matt Lr is a 59 y.o. female being evaluated by a Virtual Visit (video visit) encounter to address concerns as mentioned above. A caregiver was present when appropriate. Due to this being a TeleHealth encounter (During [facility]-11 public health emergency), evaluation of the following organ systems was limited: Vitals/Constitutional/EENT/Resp/CV/GI//MS/Neuro/Skin/Heme-Lymph-Imm. Pursuant to the emergency declaration under the [address] [place] [address], [address] [place] [address] authority and the [employer] Act, this Virtual Visit was conducted with patient's (and/or legal guardian's) consent, to reduce the patient's risk of exposure to COVID-19 and provide necessary medical care. The patient (and/or legal guardian) has also been advised to contact this office for worsening conditions or problems, and seek emergency medical treatment and/or call 911 if deemed necessary. Patient identification was verified at the start of the visit: Yes    Total time spent on this encounter: Not billed by time    Services were provided through a video synchronous discussion virtually to substitute for in-person clinic visit. Patient and provider were located at their individual homes. --Misael Connelly MD on 5/13/2020 at 12:01 PM    An electronic signature was used to authenticate this note.

## 2020-06-23 RX ORDER — LOVASTATIN 40 MG/1
TABLET ORAL
Qty: 90 TABLET | Refills: 1 | Status: SHIPPED | OUTPATIENT
Start: 2020-06-23 | End: 2021-01-20 | Stop reason: SDUPTHER

## 2020-06-23 RX ORDER — LOSARTAN POTASSIUM 100 MG/1
TABLET ORAL
Qty: 90 TABLET | Refills: 1 | Status: SHIPPED | OUTPATIENT
Start: 2020-06-23 | End: 2021-01-20 | Stop reason: SDUPTHER

## 2020-08-04 ENCOUNTER — TELEPHONE (OUTPATIENT)
Dept: FAMILY MEDICINE CLINIC | Age: 65
End: 2020-08-04

## 2020-08-04 NOTE — TELEPHONE ENCOUNTER
Noted, I will see her at her upcoming appt. If she has any of the previously mentioned symptoms in the interim she should go to the ER.

## 2020-08-04 NOTE — TELEPHONE ENCOUNTER
----- Message from Nito Garcia sent at 8/4/2020 10:00 AM EDT -----  Subject: Message to Provider    QUESTIONS  Information for Provider? pt was recently seen at the dentist but was sent   home due to her BP being she was told . When she came in it was 140/100   then proceeded to giving her laughing gas and made it go up higher to to   162/108 and then before she left it was 140/100 again .   ---------------------------------------------------------------------------  --------------  CALL BACK INFO  What is the best way for the office to contact you? OK to leave message on   voicemail  Preferred Call Back Phone Number? [phone]  ---------------------------------------------------------------------------  --------------  SCRIPT ANSWERS  Relationship to Patient?  Self

## 2020-08-04 NOTE — TELEPHONE ENCOUNTER
She said that she feels fine. Her eyes she said that she needs to go to the eye dr. She said that she had none of the symptons that you ask. She said that she feels fine. When she got her BP was 153/106 that was at 10:15.

## 2020-08-04 NOTE — TELEPHONE ENCOUNTER
Was she having any chest pain, shortness of breath, visual disturbances, lightheadedness or dizziness while at the dentist office? She has an appointment with me on 8/6 we can further evaluate then.

## 2020-08-06 ENCOUNTER — OFFICE VISIT (OUTPATIENT)
Dept: FAMILY MEDICINE CLINIC | Age: 65
End: 2020-08-06
Payer: MEDICARE

## 2020-08-06 VITALS
DIASTOLIC BLOOD PRESSURE: 102 MMHG | TEMPERATURE: 97.5 F | OXYGEN SATURATION: 97 % | BODY MASS INDEX: 29.97 KG/M2 | SYSTOLIC BLOOD PRESSURE: 142 MMHG | HEART RATE: 93 BPM | RESPIRATION RATE: 16 BRPM | WEIGHT: 174.6 LBS

## 2020-08-06 LAB — HBA1C MFR BLD: 5.7 %

## 2020-08-06 PROCEDURE — 90670 PCV13 VACCINE IM: CPT | Performed by: FAMILY MEDICINE

## 2020-08-06 PROCEDURE — G8427 DOCREV CUR MEDS BY ELIG CLIN: HCPCS | Performed by: FAMILY MEDICINE

## 2020-08-06 PROCEDURE — 1036F TOBACCO NON-USER: CPT | Performed by: FAMILY MEDICINE

## 2020-08-06 PROCEDURE — 1090F PRES/ABSN URINE INCON ASSESS: CPT | Performed by: FAMILY MEDICINE

## 2020-08-06 PROCEDURE — 99214 OFFICE O/P EST MOD 30 MIN: CPT | Performed by: FAMILY MEDICINE

## 2020-08-06 PROCEDURE — 4040F PNEUMOC VAC/ADMIN/RCVD: CPT | Performed by: FAMILY MEDICINE

## 2020-08-06 PROCEDURE — G8417 CALC BMI ABV UP PARAM F/U: HCPCS | Performed by: FAMILY MEDICINE

## 2020-08-06 PROCEDURE — G8399 PT W/DXA RESULTS DOCUMENT: HCPCS | Performed by: FAMILY MEDICINE

## 2020-08-06 PROCEDURE — 3017F COLORECTAL CA SCREEN DOC REV: CPT | Performed by: FAMILY MEDICINE

## 2020-08-06 PROCEDURE — 83036 HEMOGLOBIN GLYCOSYLATED A1C: CPT | Performed by: FAMILY MEDICINE

## 2020-08-06 PROCEDURE — G0009 ADMIN PNEUMOCOCCAL VACCINE: HCPCS | Performed by: FAMILY MEDICINE

## 2020-08-06 PROCEDURE — 1123F ACP DISCUSS/DSCN MKR DOCD: CPT | Performed by: FAMILY MEDICINE

## 2020-08-06 RX ORDER — AMLODIPINE BESYLATE 5 MG/1
5 TABLET ORAL DAILY
Qty: 30 TABLET | Refills: 0 | Status: SHIPPED | OUTPATIENT
Start: 2020-08-06 | End: 2020-08-14 | Stop reason: SDUPTHER

## 2020-08-06 ASSESSMENT — ENCOUNTER SYMPTOMS: SHORTNESS OF BREATH: 0

## 2020-08-06 NOTE — PATIENT INSTRUCTIONS
Patient Education        amlodipine  Pronunciation:  sayda rosado  Brand:  Mariela Dill  What is the most important information I should know about amlodipine? Follow all directions on your medicine label and package. Tell each of your healthcare providers about all your medical conditions, allergies, and all medicines you use. What is amlodipine? Amlodipine is a calcium channel blocker that dilates (widens) blood vessels and improves blood flow. Amlodipine is used to treat chest pain (angina) and other conditions caused by coronary artery disease. Amlodipine is also used to treat high blood pressure (hypertension) in adults and children at least 10years old. Lowering blood pressure may lower your risk of a stroke or heart attack. Amlodipine may also be used for purposes not listed in this medication guide. What should I discuss with my healthcare provider before taking amlodipine? You should not take amlodipine if you are allergic to it. Tell your doctor if you have ever had:  · liver disease; or  · a heart valve problem called aortic stenosis. Tell your doctor if you are pregnant or plan to become pregnant. It is not known whether amlodipine will harm an unborn baby. However, having high blood pressure during pregnancy may cause complications such as diabetes or eclampsia (dangerously high blood pressure that can lead to medical problems in both mother and baby). The benefit of treating hypertension may outweigh any risks to the baby. Tell your doctor if you are breastfeeding. How should I take amlodipine? Follow all directions on your prescription label and read all medication guides or instruction sheets. Your doctor may occasionally change your dose. Use the medicine exactly as directed. Take the medicine at the same time each day, with or without food. Shake the oral suspension (liquid) before you measure a dose.  Use the dosing syringe provided, or use a medicine dose-measuring device (not a kitchen spoon). Your blood pressure will need to be checked often. Your chest pain may become worse when you first start taking amlodipine or when your dose is increased. Call your doctor if your chest pain is severe or ongoing. If you are being treated for high blood pressure, keep using amlodipine even if you feel well. High blood pressure often has no symptoms. You may need to use blood pressure medicine for the rest of your life. Your hypertension or heart condition may be treated with a combination of drugs. Use all medications as directed and read all medication guides you receive. Do not change your doses or stop taking any of your medications without your doctor's advice. This is especially important if you also take nitroglycerin. Amlodipine is only part of a complete program of treatment that may also include diet, exercise, weight control, and other medications. Follow your diet, medication, and exercise routines very closely. Store at room temperature away from moisture, heat, and light. What happens if I miss a dose? Take the medicine as soon as you can, but skip the missed dose if you are more than 12 hours late for the dose. Do not take two doses at one time. What happens if I overdose? Seek emergency medical attention or call the Poison Help line at 1-114.133.1325. Overdose symptoms may include rapid heartbeats, redness or warmth in your arms or legs, or fainting. What should I avoid while taking amlodipine? Avoid getting up too fast from a sitting or lying position, or you may feel dizzy. What are the possible side effects of amlodipine? Get emergency medical help if you have signs of an allergic reaction:  hives; difficulty breathing; swelling of your face, lips, tongue, or throat. In rare cases, when you first start taking amlodipine, your angina may get worse or you could have a heart attack.  Seek emergency medical attention or call your doctor right away if you have DASH diet is one of several lifestyle changes your doctor may recommend to lower your high blood pressure. Your doctor may also want you to decrease the amount of sodium in your diet. Lowering sodium while following the DASH diet can lower blood pressure even further than just the DASH diet alone. Follow-up care is a key part of your treatment and safety. Be sure to make and go to all appointments, and call your doctor if you are having problems. It's also a good idea to know your test results and keep a list of the medicines you take. How can you care for yourself at home? Following the DASH diet  · Eat 4 to 5 servings of fruit each day. A serving is 1 medium-sized piece of fruit, ½ cup chopped or canned fruit, 1/4 cup dried fruit, or 4 ounces (½ cup) of fruit juice. Choose fruit more often than fruit juice. · Eat 4 to 5 servings of vegetables each day. A serving is 1 cup of lettuce or raw leafy vegetables, ½ cup of chopped or cooked vegetables, or 4 ounces (½ cup) of vegetable juice. Choose vegetables more often than vegetable juice. · Get 2 to 3 servings of low-fat and fat-free dairy each day. A serving is 8 ounces of milk, 1 cup of yogurt, or 1 ½ ounces of cheese. · Eat 6 to 8 servings of grains each day. A serving is 1 slice of bread, 1 ounce of dry cereal, or ½ cup of cooked rice, pasta, or cooked cereal. Try to choose whole-grain products as much as possible. · Limit lean meat, poultry, and fish to 2 servings each day. A serving is 3 ounces, about the size of a deck of cards. · Eat 4 to 5 servings of nuts, seeds, and legumes (cooked dried beans, lentils, and split peas) each week. A serving is 1/3 cup of nuts, 2 tablespoons of seeds, or ½ cup of cooked beans or peas. · Limit fats and oils to 2 to 3 servings each day. A serving is 1 teaspoon of vegetable oil or 2 tablespoons of salad dressing. · Limit sweets and added sugars to 5 servings or less a week.  A serving is 1 tablespoon jelly or jam, ½ cup sorbet, or 1 cup of lemonade. · Eat less than 2,300 milligrams (mg) of sodium a day. If you limit your sodium to 1,500 mg a day, you can lower your blood pressure even more. Tips for success  · Start small. Do not try to make dramatic changes to your diet all at once. You might feel that you are missing out on your favorite foods and then be more likely to not follow the plan. Make small changes, and stick with them. Once those changes become habit, add a few more changes. · Try some of the following:  ? Make it a goal to eat a fruit or vegetable at every meal and at snacks. This will make it easy to get the recommended amount of fruits and vegetables each day. ? Try yogurt topped with fruit and nuts for a snack or healthy dessert. ? Add lettuce, tomato, cucumber, and onion to sandwiches. ? Combine a ready-made pizza crust with low-fat mozzarella cheese and lots of vegetable toppings. Try using tomatoes, squash, spinach, broccoli, carrots, cauliflower, and onions. ? Have a variety of cut-up vegetables with a low-fat dip as an appetizer instead of chips and dip. ? Sprinkle sunflower seeds or chopped almonds over salads. Or try adding chopped walnuts or almonds to cooked vegetables. ? Try some vegetarian meals using beans and peas. Add garbanzo or kidney beans to salads. Make burritos and tacos with mashed mohan beans or black beans. Where can you learn more? Go to https://[employer]."[employer]". org and sign in to your [facility] account. Enter E346 in the [facility] box to learn more about \"DASH Diet: Care Instructions. \"     If you do not have an account, please click on the \"Sign Up Now\" link. Current as of: December 16, 2019               Content Version: 12.5  © 2268-8496 [employer]. Care instructions adapted under license by [facility] ([facility]).  If you have questions about a medical condition or this instruction, always ask your healthcare professional. [employer] disclaims any warranty or liability for your use of this information.

## 2020-08-06 NOTE — PROGRESS NOTES
Chief Complaint   Patient presents with    Blood Pressure Check     Blood pressure was high at the dentist - denies any symptoms          HPI      72 y.o. female presents today with above complaints. History of hypertension reports compliance with losartan 100 mg without side effects. Patient states that she was at her dentist office and her blood pressure was elevated. She did have a slight headache but denied any chest pain shortness of breath or visual changes. .  She has been monitoring her blood pressures at home and they have also been not at goal for her age. Blood pressure today in office 142/102.       Patient Active Problem List   Diagnosis    Anxiety    HTN (hypertension)    HLD (hyperlipidemia)    Low back pain    Benign neoplasm of colon    Swelling, mass, or lump in head and neck    Chronic sinusitis     Past Medical History:   Diagnosis Date    Anxiety     Hypertension        Past Surgical History:   Procedure Laterality Date    COLONOSCOPY  9-8-14    colon polyp removed, diverticulosis     Most Recent Immunizations   Administered Date(s) Administered    Influenza, Intradermal, Quadrivalent, Preservative Free 11/01/2017    Influenza, Quadv, IM, PF (6 mo and older Fluzone, Flulaval, Fluarix, and 3 yrs and older Afluria) 09/11/2019    Influenza, Rl Sepulveda, Recombinant, IM PF (Flublok 18 yrs and older) 09/19/2018    Pneumococcal Conjugate 13-valent ([id number]) 08/06/2020    Tdap (Boostrix, Adacel) 05/22/2013    Zoster Live (Zostavax) 02/22/2016        Current Outpatient Medications   Medication Sig Dispense Refill    amLODIPine (NORVASC) 5 MG tablet Take 1 tablet by mouth daily 30 tablet 0    losartan (COZAAR) 100 MG tablet TAKE 1 TABLET BY MOUTH ONCE DAILY 90 tablet 1    lovastatin (MEVACOR) 40 MG tablet TAKE 1 TABLET BY MOUTH NIGHTLY 90 tablet 1    sertraline (ZOLOFT) 50 MG tablet TAKE 1 TABLET BY MOUTH ONCE DAILY 90 tablet 1    ibuprofen (ADVIL;MOTRIN) 800 MG tablet TAKE 1 TABLET EVERY 6 HOURSAS NEEDED 180 tablet 0    aspirin 81 MG chewable tablet Take 81 mg by mouth      Coenzyme Q10 10 MG CAPS Take 10 mg by mouth      Calcium Carb-Cholecalciferol (CALCIUM CARBONATE-VITAMIN D3 PO) Take by mouth      fluticasone (FLONASE) 50 MCG/ACT nasal spray 1 spray by Each Nare route daily 1 Spray in each nostril (Patient not taking: Reported on 2020) 1 Bottle 0     No current facility-administered medications for this visit. No Known Allergies    Social History     Socioeconomic History    Marital status: Single     Spouse name: Not on file    Number of children: Not on file    Years of education: 15    Highest education level: Not on file   Occupational History    Occupation: retired   Social Needs    Financial resource strain: Not on file    Food insecurity     Worry: Not on file     Inability: Not on file   [employer] needs     Medical: Not on file     Non-medical: Not on file   Tobacco Use    Smoking status: Former Smoker     Packs/day: 0.50     Years: 20.00     Pack years: 10.00     Types: Cigarettes     Last attempt to quit: 5/3/1996     Years since quittin.2    Smokeless tobacco: Never Used   Substance and Sexual Activity    Alcohol use:  Yes     Alcohol/week: 2.0 standard drinks     Types: 2 Cans of beer per week    Drug use: No    Sexual activity: Not Currently     Partners: Male   Lifestyle    Physical activity     Days per week: Not on file     Minutes per session: Not on file    Stress: Not on file   Relationships    Social connections     Talks on phone: Not on file     Gets together: Not on file     Attends Buddhism service: Not on file     Active member of club or organization: Not on file     Attends meetings of clubs or organizations: Not on file     Relationship status: Not on file    Intimate partner violence     Fear of current or ex partner: Not on file     Emotionally abused: Not on file     Physically abused: Not on file     Forced advised to limit sugars and simple carbs. -     POCT glycosylated hemoglobin (Hb A1C)    Need for vaccination  -     PREVNAR 13 IM (Pneumococcal conjugate vaccine 13-valent)      Billing based on time. 25 minutes spent with the patient, and greater than 50% was spent in counseling         Plan:   See orders and medications filed with this encounter. Return in about 3 months (around 11/6/2020) for hypertension.

## 2020-08-14 RX ORDER — AMLODIPINE BESYLATE 5 MG/1
5 TABLET ORAL DAILY
Qty: 90 TABLET | Refills: 1 | Status: SHIPPED | OUTPATIENT
Start: 2020-08-14 | End: 2020-08-25

## 2020-08-25 RX ORDER — AMLODIPINE BESYLATE 10 MG/1
10 TABLET ORAL DAILY
Qty: 90 TABLET | Refills: 0 | Status: SHIPPED | OUTPATIENT
Start: 2020-08-25 | End: 2020-10-09 | Stop reason: SDUPTHER

## 2020-09-08 ENCOUNTER — OFFICE VISIT (OUTPATIENT)
Dept: FAMILY MEDICINE CLINIC | Age: 65
End: 2020-09-08
Payer: MEDICARE

## 2020-09-08 VITALS
HEART RATE: 85 BPM | SYSTOLIC BLOOD PRESSURE: 138 MMHG | TEMPERATURE: 97.8 F | OXYGEN SATURATION: 98 % | BODY MASS INDEX: 30.11 KG/M2 | WEIGHT: 176.4 LBS | DIASTOLIC BLOOD PRESSURE: 88 MMHG | HEIGHT: 64 IN

## 2020-09-08 LAB
A/G RATIO: 2.2 (ref 1.1–2.2)
ALBUMIN SERPL-MCNC: 4.6 G/DL (ref 3.4–5)
ALP BLD-CCNC: 43 U/L (ref 40–129)
ALT SERPL-CCNC: 34 U/L (ref 10–40)
ANION GAP SERPL CALCULATED.3IONS-SCNC: 12 MMOL/L (ref 3–16)
AST SERPL-CCNC: 22 U/L (ref 15–37)
BASOPHILS ABSOLUTE: 0 K/UL (ref 0–0.2)
BASOPHILS RELATIVE PERCENT: 0.7 %
BILIRUB SERPL-MCNC: 0.4 MG/DL (ref 0–1)
BUN BLDV-MCNC: 11 MG/DL (ref 7–20)
CALCIUM SERPL-MCNC: 10.3 MG/DL (ref 8.3–10.6)
CHLORIDE BLD-SCNC: 102 MMOL/L (ref 99–110)
CHOLESTEROL, TOTAL: 167 MG/DL (ref 0–199)
CO2: 24 MMOL/L (ref 21–32)
CREAT SERPL-MCNC: 0.6 MG/DL (ref 0.6–1.2)
EOSINOPHILS ABSOLUTE: 0.2 K/UL (ref 0–0.6)
EOSINOPHILS RELATIVE PERCENT: 2.8 %
GFR AFRICAN AMERICAN: >60
GFR NON-AFRICAN AMERICAN: >60
GLOBULIN: 2.1 G/DL
GLUCOSE BLD-MCNC: 103 MG/DL (ref 70–99)
HCT VFR BLD CALC: 41.1 % (ref 36–48)
HDLC SERPL-MCNC: 53 MG/DL (ref 40–60)
HEMOGLOBIN: 14 G/DL (ref 12–16)
LDL CHOLESTEROL CALCULATED: 91 MG/DL
LYMPHOCYTES ABSOLUTE: 1.6 K/UL (ref 1–5.1)
LYMPHOCYTES RELATIVE PERCENT: 29.4 %
MCH RBC QN AUTO: 31.7 PG (ref 26–34)
MCHC RBC AUTO-ENTMCNC: 34.1 G/DL (ref 31–36)
MCV RBC AUTO: 92.9 FL (ref 80–100)
MONOCYTES ABSOLUTE: 0.6 K/UL (ref 0–1.3)
MONOCYTES RELATIVE PERCENT: 10.5 %
NEUTROPHILS ABSOLUTE: 3.1 K/UL (ref 1.7–7.7)
NEUTROPHILS RELATIVE PERCENT: 56.6 %
PDW BLD-RTO: 13.4 % (ref 12.4–15.4)
PLATELET # BLD: 143 K/UL (ref 135–450)
PMV BLD AUTO: 10 FL (ref 5–10.5)
POTASSIUM SERPL-SCNC: 4.3 MMOL/L (ref 3.5–5.1)
RBC # BLD: 4.42 M/UL (ref 4–5.2)
SODIUM BLD-SCNC: 138 MMOL/L (ref 136–145)
TOTAL PROTEIN: 6.7 G/DL (ref 6.4–8.2)
TRIGL SERPL-MCNC: 117 MG/DL (ref 0–150)
TSH REFLEX FT4: 1.37 UIU/ML (ref 0.27–4.2)
VLDLC SERPL CALC-MCNC: 23 MG/DL
WBC # BLD: 5.5 K/UL (ref 4–11)

## 2020-09-08 PROCEDURE — G8399 PT W/DXA RESULTS DOCUMENT: HCPCS | Performed by: FAMILY MEDICINE

## 2020-09-08 PROCEDURE — 99214 OFFICE O/P EST MOD 30 MIN: CPT | Performed by: FAMILY MEDICINE

## 2020-09-08 PROCEDURE — 3017F COLORECTAL CA SCREEN DOC REV: CPT | Performed by: FAMILY MEDICINE

## 2020-09-08 PROCEDURE — G8427 DOCREV CUR MEDS BY ELIG CLIN: HCPCS | Performed by: FAMILY MEDICINE

## 2020-09-08 PROCEDURE — 36415 COLL VENOUS BLD VENIPUNCTURE: CPT | Performed by: FAMILY MEDICINE

## 2020-09-08 PROCEDURE — 4040F PNEUMOC VAC/ADMIN/RCVD: CPT | Performed by: FAMILY MEDICINE

## 2020-09-08 PROCEDURE — 1090F PRES/ABSN URINE INCON ASSESS: CPT | Performed by: FAMILY MEDICINE

## 2020-09-08 PROCEDURE — 1123F ACP DISCUSS/DSCN MKR DOCD: CPT | Performed by: FAMILY MEDICINE

## 2020-09-08 PROCEDURE — G8417 CALC BMI ABV UP PARAM F/U: HCPCS | Performed by: FAMILY MEDICINE

## 2020-09-08 PROCEDURE — G0402 INITIAL PREVENTIVE EXAM: HCPCS | Performed by: FAMILY MEDICINE

## 2020-09-08 PROCEDURE — 1036F TOBACCO NON-USER: CPT | Performed by: FAMILY MEDICINE

## 2020-09-08 PROCEDURE — G0403 EKG FOR INITIAL PREVENT EXAM: HCPCS | Performed by: FAMILY MEDICINE

## 2020-09-08 ASSESSMENT — LIFESTYLE VARIABLES
HOW OFTEN DO YOU HAVE SIX OR MORE DRINKS ON ONE OCCASION: 1
HOW OFTEN DURING THE LAST YEAR HAVE YOU FAILED TO DO WHAT WAS NORMALLY EXPECTED FROM YOU BECAUSE OF DRINKING: 0
HOW OFTEN DURING THE LAST YEAR HAVE YOU HAD A FEELING OF GUILT OR REMORSE AFTER DRINKING: 0
HAVE YOU OR SOMEONE ELSE BEEN INJURED AS A RESULT OF YOUR DRINKING: 0
HOW MANY STANDARD DRINKS CONTAINING ALCOHOL DO YOU HAVE ON A TYPICAL DAY: 1
HOW OFTEN DO YOU HAVE A DRINK CONTAINING ALCOHOL: 2
HAS A RELATIVE, FRIEND, DOCTOR, OR ANOTHER HEALTH PROFESSIONAL EXPRESSED CONCERN ABOUT YOUR DRINKING OR SUGGESTED YOU CUT DOWN: 0
HOW OFTEN DURING THE LAST YEAR HAVE YOU BEEN UNABLE TO REMEMBER WHAT HAPPENED THE NIGHT BEFORE BECAUSE YOU HAD BEEN DRINKING: 0
HOW OFTEN DURING THE LAST YEAR HAVE YOU NEEDED AN ALCOHOLIC DRINK FIRST THING IN THE MORNING TO GET YOURSELF GOING AFTER A NIGHT OF HEAVY DRINKING: 0
AUDIT-C TOTAL SCORE: 4
HOW OFTEN DURING THE LAST YEAR HAVE YOU FOUND THAT YOU WERE NOT ABLE TO STOP DRINKING ONCE YOU HAD STARTED: 0
AUDIT TOTAL SCORE: 4

## 2020-09-08 ASSESSMENT — PATIENT HEALTH QUESTIONNAIRE - PHQ9
SUM OF ALL RESPONSES TO PHQ QUESTIONS 1-9: 0
SUM OF ALL RESPONSES TO PHQ QUESTIONS 1-9: 0

## 2020-09-08 NOTE — PROGRESS NOTES
Medicare Annual Wellness Visit  Name: Swati De Los Santos Date: 2020   MRN: <[id number]> Sex: Female   Age: 72 y.o. Ethnicity: Non-/Non    : 1955 Race: Marleni Lagunas is here for Medicare AWV    Screenings for behavioral, psychosocial and functional/safety risks, and cognitive dysfunction are all negative except as indicated below. These results, as well as other patient data from the [address] form, are documented in Flowsheets linked to this Encounter. No Known Allergies      Prior to Visit Medications    Medication Sig Taking?  Authorizing Provider   amLODIPine (NORVASC) 10 MG tablet Take 1 tablet by mouth daily Yes Brandi Huynh MD   losartan (COZAAR) 100 MG tablet TAKE 1 TABLET BY MOUTH ONCE DAILY Yes Brandi Huynh MD   lovastatin (MEVACOR) 40 MG tablet TAKE 1 TABLET BY MOUTH NIGHTLY Yes Brandi Huynh MD   sertraline (ZOLOFT) 50 MG tablet TAKE 1 TABLET BY MOUTH ONCE DAILY Yes Brandi Hunyh MD   ibuprofen (ADVIL;MOTRIN) 800 MG tablet TAKE 1 TABLET EVERY 6  Books Yes Brandi Huynh MD   fluticasone (FLONASE) 50 MCG/ACT nasal spray 1 spray by Each Nare route daily 1 Spray in each nostril Yes Brandi Huynh MD   aspirin 81 MG chewable tablet Take 81 mg by mouth Yes Historical Provider, MD   Coenzyme Q10 10 MG CAPS Take 10 mg by mouth Yes Historical Provider, MD   Calcium Carb-Cholecalciferol (CALCIUM CARBONATE-VITAMIN D3 PO) Take by mouth Yes Historical Provider, MD         Past Medical History:   Diagnosis Date    Anxiety     Hypertension        Past Surgical History:   Procedure Laterality Date    COLONOSCOPY  14    colon polyp removed, diverticulosis         Family History   Problem Relation Age of Onset    Cancer Mother         Pancreatic Cancer    Diabetes Mother     High Blood Pressure Mother        CareTeam (Including outside providers/suppliers regularly involved in providing care):   Patient Care Team:  Nelly Farrar MD as PCP - General (Family Medicine)  Nelly Farrar MD as PCP - Padmini Calles Provider    Wt Readings from Last 3 Encounters:   09/08/20 176 lb 6.4 oz (80 kg)   08/06/20 174 lb 9.6 oz (79.2 kg)   09/11/19 174 lb 14.4 oz (79.3 kg)     Vitals:    09/08/20 0923   BP: 138/88   Pulse: 85   Temp: 97.8 °F (36.6 °C)   TempSrc: Temporal   SpO2: 98%   Weight: 176 lb 6.4 oz (80 kg)   Height: 5' 4\" (1.626 m)     Body mass index is 30.28 kg/m². Based upon direct observation of the patient, evaluation of cognition reveals recent and remote memory intact. General Appearance: alert and oriented to person, place and time, well developed and well- nourished, in no acute distress  Skin: warm and dry, no rash or erythema  Head: normocephalic and atraumatic  Eyes: pupils equal, round, and reactive to light, extraocular eye movements intact, conjunctivae normal  Pulmonary/Chest: clear to auscultation bilaterally- no wheezes, rales or rhonchi, normal air movement, no respiratory distress  Cardiovascular: normal rate, regular rhythm, normal S1 and S2, no murmurs, rubs, clicks, or gallops, distal pulses intact, no carotid bruits  Extremities: no cyanosis, clubbing or edema    Patient's complete Health Risk Assessment and screening values have been reviewed and are found in Flowsheets. The following problems were reviewed today and where indicated follow up appointments were made and/or referrals ordered. Positive Risk Factor Screenings with Interventions:     General Health:  General  In general, how would you say your health is?: Good  In the past 7 days, have you experienced any of the following?  New or Increased Pain, New or Increased Fatigue, Loneliness, Social Isolation, Stress or Anger?: (!) New or Increased Fatigue  Do you get the social and emotional support that you need?: Yes  Do you have a Living Will?: Yes  General Health Risk Interventions:  · Fatigue: thinks it is related to babysitting a 3year old 3 days per week. Health Habits/Nutrition:  Health Habits/Nutrition  Do you exercise for at least 20 minutes 2-3 times per week?: Yes  Have you lost any weight without trying in the past 3 months?: No  Do you eat fewer than 2 meals per day?: No  Have you seen a dentist within the past year?: Yes  Body mass index is 30.28 kg/m².   Health Habits/Nutrition Interventions:  · discussed working on improving diet and exercise    Hearing/Vision:  No exam data present  Hearing/Vision  Do you or your family notice any trouble with your hearing?: No  Do you have difficulty driving, watching TV, or doing any of your daily activities because of your eyesight?: No  Have you had an eye exam within the past year?: (!) No  Hearing/Vision Interventions:  · Vision concerns:  patient encouraged to make appointment with his/her eye specialist    Safety:  Safety  Do you have working smoke detectors?: Yes  Have all throw rugs been removed or fastened?: (!) No  Do you have non-slip mats or surfaces in all bathtubs/showers?: (!) No  Do all of your stairways have a railing or banister?: Yes  Are your doorways, halls and stairs free of clutter?: Yes  Do you always fasten your seatbelt when you are in a car?: Yes  Safety Interventions:  · Home safety tips provided    Personalized Preventive Plan   Current Health Maintenance Status  Immunization History   Administered Date(s) Administered    Influenza, Intradermal, Quadrivalent, Preservative Free 11/01/2017    Influenza, Quadv, IM, PF (6 mo and older Fluzone, Flulaval, Fluarix, and 3 yrs and older Afluria) 09/11/2019    Influenza, Quadv, Recombinant, IM PF (Flublok 18 yrs and older) 09/19/2018    Pneumococcal Conjugate 13-valent ([id number]) 08/06/2020    Tdap (Boostrix, Adacel) 05/22/2013    Zoster Live (Zostavax) 02/22/2016        Health Maintenance   Topic Date Due    Shingles Vaccine (2 of 3) 04/18/2016    Cervical cancer screen  05/19/2017    Flu vaccine (1) 09/01/2020    Lipid screen  09/11/2020    Potassium monitoring  09/11/2020    Creatinine monitoring  09/11/2020    Breast cancer screen  03/20/2021    A1C test (Diabetic or Prediabetic)  08/06/2021    Pneumococcal 65+ years Vaccine (2 of 2 - PPSV23) 08/06/2021    DTaP/Tdap/Td vaccine (2 - Td) 05/22/2023    Colon cancer screen colonoscopy  09/08/2024    DEXA (modify frequency per FRAX score)  Completed    Hepatitis C screen  Completed    HIV screen  Completed    Hepatitis A vaccine  Aged Out    Hepatitis B vaccine  Aged Out    Hib vaccine  Aged Out    Meningococcal (ACWY) vaccine  Aged Out     Recommendations for [email or web address] Due: see orders and patient instructions/AVS.  . Recommended screening schedule for the next 5-10 years is provided to the patient in written form: see Patient Instructions/AVS.    Kiara Cho was seen today for medicare aw. Diagnoses and all orders for this visit:    Routine general medical examination at a health care facility    Essential hypertension  -     Comprehensive Metabolic Panel    Mixed hyperlipidemia  -     Lipid Panel  -     Comprehensive Metabolic Panel    Fatigue, unspecified type  -     CBC Auto Differential  -     TSH WITH REFLEX TO FT4    SOB (shortness of breath) on exertion  Patient reports shortness of breath with exertion and chest tightness x1 month intermittently. She does not describe it as pain. She denies any dizziness or lightheadedness. She is unable to state any alleviating or exacerbating factors. No family history of early MI.  EKG in office similar to previous EKG.   Given this patient's age will send for stress testing.  -     EKG 12 Lead  -     ECHO (Dobutamine) Pharmacological Stress Test; Future

## 2020-09-08 NOTE — PATIENT INSTRUCTIONS
Patient Education        Learning About Diabetes Food Guidelines  Your Care Instructions     Meal planning is important to manage diabetes. It helps keep your blood sugar at a target level (which you set with your doctor). You don't have to eat special foods. You can eat what your family eats, including sweets once in a while. But you do have to pay attention to how often you eat and how much you eat of certain foods. You may want to work with a dietitian or a certified diabetes educator (CDE) to help you plan meals and snacks. A dietitian or CDE can also help you lose weight if that is one of your goals. What should you know about eating carbs? Managing the amount of carbohydrate (carbs) you eat is an important part of healthy meals when you have diabetes. Carbohydrate is found in many foods. · Learn which foods have carbs. And learn the amounts of carbs in different foods. ? Bread, cereal, pasta, and rice have about 15 grams of carbs in a serving. A serving is 1 slice of bread (1 ounce), ½ cup of cooked cereal, or 1/3 cup of cooked pasta or rice. ? Fruits have 15 grams of carbs in a serving. A serving is 1 small fresh fruit, such as an apple or orange; ½ of a banana; ½ cup of cooked or canned fruit; ½ cup of fruit juice; 1 cup of melon or raspberries; or 2 tablespoons of dried fruit. ? Milk and no-sugar-added yogurt have 15 grams of carbs in a serving. A serving is 1 cup of milk or 2/3 cup of no-sugar-added yogurt. ? Starchy vegetables have 15 grams of carbs in a serving. A serving is ½ cup of mashed potatoes or sweet potato; 1 cup winter squash; ½ of a small baked potato; ½ cup of cooked beans; or ½ cup cooked corn or green peas. · Learn how much carbs to eat each day and at each meal. A dietitian or CDE can teach you how to keep track of the amount of carbs you eat. This is called carbohydrate counting. · If you are not sure how to count carbohydrate grams, use the Plate Method to plan meals.  It is a good, quick way to make sure that you have a balanced meal. It also helps you spread carbs throughout the day. ? Divide your plate by types of foods. Put non-starchy vegetables on half the plate, meat or other protein food on one-quarter of the plate, and a grain or starchy vegetable in the final quarter of the plate. To this you can add a small piece of fruit and 1 cup of milk or yogurt, depending on how many carbs you are supposed to eat at a meal.  · Try to eat about the same amount of carbs at each meal. Do not \"save up\" your daily allowance of carbs to eat at one meal.  · Proteins have very little or no carbs per serving. Examples of proteins are beef, chicken, turkey, fish, eggs, tofu, cheese, cottage cheese, and peanut butter. A serving size of meat is 3 ounces, which is about the size of a deck of cards. Examples of meat substitute serving sizes (equal to 1 ounce of meat) are 1/4 cup of cottage cheese, 1 egg, 1 tablespoon of peanut butter, and ½ cup of tofu. How can you eat out and still eat healthy? · Learn to estimate the serving sizes of foods that have carbohydrate. If you measure food at home, it will be easier to estimate the amount in a serving of restaurant food. · If the meal you order has too much carbohydrate (such as potatoes, corn, or baked beans), ask to have a low-carbohydrate food instead. Ask for a salad or green vegetables. · If you use insulin, check your blood sugar before and after eating out to help you plan how much to eat in the future. · If you eat more carbohydrate at a meal than you had planned, take a walk or do other exercise. This will help lower your blood sugar. What else should you know? · Limit saturated fat, such as the fat from meat and dairy products. This is a healthy choice because people who have diabetes are at higher risk of heart disease. So choose lean cuts of meat and nonfat or low-fat dairy products.  Use olive or canola oil instead of butter or shortening when cooking. · Don't skip meals. Your blood sugar may drop too low if you skip meals and take insulin or certain medicines for diabetes. · Check with your doctor before you drink alcohol. Alcohol can cause your blood sugar to drop too low. Alcohol can also cause a bad reaction if you take certain diabetes medicines. Follow-up care is a key part of your treatment and safety. Be sure to make and go to all appointments, and call your doctor if you are having problems. It's also a good idea to know your test results and keep a list of the medicines you take. Where can you learn more? Go to https://[email or web address].[employer]. org and sign in to your [employer] account. Enter R227 in the [employer] box to learn more about \"Learning About Diabetes Food Guidelines. \"     If you do not have an account, please click on the \"Sign Up Now\" link. Current as of: December 20, 2019               Content Version: 12.5  © 6593-0417 Healthwise, [employer]. Care instructions adapted under license by [facility] ([facility]). If you have questions about a medical condition or this instruction, always ask your healthcare professional. [place] [address] any warranty or liability for your use of this information. Personalized Preventive Plan for Anirudh Ortega - 9/8/2020  Medicare offers a range of preventive health benefits. Some of the tests and screenings are paid in full while other may be subject to a deductible, co-insurance, and/or copay. Some of these benefits include a comprehensive review of your medical history including lifestyle, illnesses that may run in your family, and various assessments and screenings as appropriate. After reviewing your medical record and screening and assessments performed today your provider may have ordered immunizations, labs, imaging, and/or referrals for you.   A list of these orders (if applicable) as well as your Preventive Care list are included within your After Visit Summary for your review. Other Preventive Recommendations:    · A preventive eye exam performed by an eye specialist is recommended every 1-2 years to screen for glaucoma; cataracts, macular degeneration, and other eye disorders. · A preventive dental visit is recommended every 6 months. · Try to get at least 150 minutes of exercise per week or 10,000 steps per day on a pedometer . · Order or download the FREE \"Exercise & Physical Activity: Your Everyday Guide\" from [employer]. Call 6-190.522.3743 or search [employer] online. · You need 0236-7580 mg of calcium and 1025-8581 IU of vitamin D per day. It is possible to meet your calcium requirement with diet alone, but a vitamin D supplement is usually necessary to meet this goal.  · When exposed to the sun, use a sunscreen that protects against both UVA and UVB radiation with an SPF of 30 or greater. Reapply every 2 to 3 hours or after sweating, drying off with a towel, or swimming. · Always wear a seat belt when traveling in a car. Always wear a helmet when riding a bicycle or motorcycle.

## 2020-09-16 ENCOUNTER — TELEPHONE (OUTPATIENT)
Dept: FAMILY MEDICINE CLINIC | Age: 65
End: 2020-09-16

## 2020-09-16 ENCOUNTER — HOSPITAL ENCOUNTER (OUTPATIENT)
Dept: NON INVASIVE DIAGNOSTICS | Age: 65
Discharge: HOME | End: 2020-09-16
Payer: MEDICARE

## 2020-09-16 LAB
LV EF: 60 %
LVEF MODALITY: NORMAL

## 2020-09-16 PROCEDURE — 6360000004 HC RX CONTRAST MEDICATION: Performed by: FAMILY MEDICINE

## 2020-09-16 PROCEDURE — 93351 STRESS TTE COMPLETE: CPT

## 2020-09-16 RX ORDER — DOBUTAMINE HYDROCHLORIDE 100 MG/100ML
10 INJECTION INTRAVENOUS CONTINUOUS
Status: DISCONTINUED | OUTPATIENT
Start: 2020-09-16 | End: 2020-09-16 | Stop reason: CLARIF

## 2020-09-16 RX ADMIN — PERFLUTREN 1.65 MG: 6.52 INJECTION, SUSPENSION INTRAVENOUS at 11:14

## 2020-09-16 NOTE — PROGRESS NOTES
Stress echocardiogram complete. Discharge instructions give to pt. Pt verbalizes understanding to discharge instructions.

## 2020-09-16 NOTE — PROGRESS NOTES
Patient arrived to stress lab for GXT Echo stress test.  Patient was educated on procedure, all questions answered, and consent verified/obtained.

## 2020-09-16 NOTE — TELEPHONE ENCOUNTER
Received call from [facility], Cardiology. She is requesting a verbal ok to change a Dobutamine echo to a stress test, stating patient prefers to walk on the treadmill. Verbal approval from Dr Dilcia Abrams.

## 2020-09-16 NOTE — PROGRESS NOTES
Patient informed RN that she could walk on the treadmill. RN called patients phyician. RN was informed patient physician last day was yesterday but someone else will be following up with patient about stress test. Dr. Camilla Bermudez was in office and gave telephone order okay to change patient to a GXT echo.

## 2020-09-21 ENCOUNTER — TELEPHONE (OUTPATIENT)
Dept: FAMILY MEDICINE CLINIC | Age: 65
End: 2020-09-21

## 2020-09-21 NOTE — TELEPHONE ENCOUNTER
Given the answers to her questions, I'm ok with red clinic visit as long as the red clinic provider is ok with it.  Thanks

## 2020-10-09 ENCOUNTER — TELEMEDICINE (OUTPATIENT)
Dept: FAMILY MEDICINE CLINIC | Age: 65
End: 2020-10-09
Payer: MEDICARE

## 2020-10-09 PROBLEM — F32.9 MAJOR DEPRESSIVE DISORDER WITH SINGLE EPISODE: Status: ACTIVE | Noted: 2018-08-01

## 2020-10-09 PROBLEM — F41.1 GAD (GENERALIZED ANXIETY DISORDER): Status: ACTIVE | Noted: 2018-08-01

## 2020-10-09 PROCEDURE — 1090F PRES/ABSN URINE INCON ASSESS: CPT | Performed by: FAMILY MEDICINE

## 2020-10-09 PROCEDURE — 99214 OFFICE O/P EST MOD 30 MIN: CPT | Performed by: FAMILY MEDICINE

## 2020-10-09 PROCEDURE — G8427 DOCREV CUR MEDS BY ELIG CLIN: HCPCS | Performed by: FAMILY MEDICINE

## 2020-10-09 PROCEDURE — 1123F ACP DISCUSS/DSCN MKR DOCD: CPT | Performed by: FAMILY MEDICINE

## 2020-10-09 PROCEDURE — 3017F COLORECTAL CA SCREEN DOC REV: CPT | Performed by: FAMILY MEDICINE

## 2020-10-09 PROCEDURE — 1036F TOBACCO NON-USER: CPT | Performed by: FAMILY MEDICINE

## 2020-10-09 PROCEDURE — G8399 PT W/DXA RESULTS DOCUMENT: HCPCS | Performed by: FAMILY MEDICINE

## 2020-10-09 PROCEDURE — G8484 FLU IMMUNIZE NO ADMIN: HCPCS | Performed by: FAMILY MEDICINE

## 2020-10-09 PROCEDURE — G8417 CALC BMI ABV UP PARAM F/U: HCPCS | Performed by: FAMILY MEDICINE

## 2020-10-09 PROCEDURE — 4040F PNEUMOC VAC/ADMIN/RCVD: CPT | Performed by: FAMILY MEDICINE

## 2020-10-09 RX ORDER — AMLODIPINE BESYLATE 10 MG/1
10 TABLET ORAL DAILY
Qty: 90 TABLET | Refills: 1 | Status: SHIPPED | OUTPATIENT
Start: 2020-10-09 | End: 2021-04-06 | Stop reason: SDUPTHER

## 2020-10-09 ASSESSMENT — ENCOUNTER SYMPTOMS
TROUBLE SWALLOWING: 0
DIARRHEA: 0
COLOR CHANGE: 0
SHORTNESS OF BREATH: 0
VOMITING: 0
COUGH: 0
BLOOD IN STOOL: 0
NAUSEA: 0
BACK PAIN: 0
CONSTIPATION: 0
ABDOMINAL PAIN: 0

## 2020-10-09 NOTE — PROGRESS NOTES
10/9/2020    TELEHEALTH EVALUATION -- Audio/Visual (During PAEVJ-63 public health emergency)    HPI:    Bryan Allison (:  1955) has requested an audio/video evaluation for the following concern(s):    Establish care  Former pt of Dr. Kenneth Simon, last visit 20    HTN: tolerating Norvasc 10, Cozaar 100  Checking levels at home daily: today 111/83    Moods: stable on Zoloft 50 mg  Would like to wean this in the future   Started when she worked, now retired x4 years    HLD: tolerating Mevacor 40 mg, no new myalgias    Due for mammo in 3/20  Due for colonoscopy update in , last in  with 1 polyp removed   DEXA last performed in , normal  \"years since her last pap smear\"     Review of Systems   Constitutional: Negative for activity change, appetite change, chills, diaphoresis, fatigue, fever and unexpected weight change. HENT: Negative for ear pain, hearing loss and trouble swallowing. Eyes: Negative for visual disturbance. Respiratory: Negative for cough and shortness of breath. Cardiovascular: Negative for chest pain, palpitations and leg swelling. Gastrointestinal: Negative for abdominal pain, blood in stool, constipation, diarrhea, nausea and vomiting. Genitourinary: Negative for decreased urine volume, difficulty urinating, dysuria, flank pain, hematuria and urgency. Musculoskeletal: Negative for arthralgias and back pain. Skin: Negative for color change and rash. Neurological: Negative for dizziness, weakness, light-headedness, numbness and headaches. Psychiatric/Behavioral: Negative for dysphoric mood and sleep disturbance. The patient is not nervous/anxious. Prior to Visit Medications    Medication Sig Taking?  Authorizing Provider   amLODIPine (NORVASC) 10 MG tablet Take 1 tablet by mouth daily Yes Andreia Salcido MD   losartan (COZAAR) 100 MG tablet TAKE 1 TABLET BY MOUTH ONCE DAILY Yes Cindy Hernandez MD   lovastatin (MEVACOR) 40 MG tablet TAKE 1 TABLET BY MOUTH NIGHTLY Yes Shashi Escobar MD   sertraline (ZOLOFT) 50 MG tablet TAKE 1 TABLET BY MOUTH ONCE DAILY Yes Shashi Escobar MD   ibuprofen (ADVIL;MOTRIN) 800 MG tablet TAKE 1 TABLET EVERY 6 HOURSAS NEEDED Yes Shashi Escobar MD   fluticasone (FLONASE) 50 MCG/ACT nasal spray 1 spray by Each Nare route daily 1 Spray in each nostril Yes Shashi Escobar MD   aspirin 81 MG chewable tablet Take 81 mg by mouth Yes Historical Provider, MD   Coenzyme Q10 10 MG CAPS Take 10 mg by mouth Yes Historical Provider, MD   Calcium Carb-Cholecalciferol (CALCIUM CARBONATE-VITAMIN D3 PO) Take by mouth Yes Historical Provider, MD       Social History     Tobacco Use    Smoking status: Former Smoker     Packs/day: 0.50     Years: 20.00     Pack years: 10.00     Types: Cigarettes     Last attempt to quit: 5/3/1996     Years since quittin.4    Smokeless tobacco: Never Used   Substance Use Topics    Alcohol use: Yes     Alcohol/week: 2.0 standard drinks     Types: 2 Cans of beer per week    Drug use: No        No Known Allergies,   Past Medical History:   Diagnosis Date    Anxiety     Hypertension    ,   Past Surgical History:   Procedure Laterality Date    COLONOSCOPY  14    colon polyp removed, diverticulosis   ,   Social History     Tobacco Use    Smoking status: Former Smoker     Packs/day: 0.50     Years: 20.00     Pack years: 10.00     Types: Cigarettes     Last attempt to quit: 5/3/1996     Years since quittin.4    Smokeless tobacco: Never Used   Substance Use Topics    Alcohol use:  Yes     Alcohol/week: 2.0 standard drinks     Types: 2 Cans of beer per week    Drug use: No   ,   Family History   Problem Relation Age of Onset    Cancer Mother         Pancreatic Cancer    Diabetes Mother     High Blood Pressure Mother    ,   Immunization History   Administered Date(s) Administered    Influenza, Intradermal, Quadrivalent, Preservative Free 2017    Influenza, Quadv, IM, PF (6 mo and older Fluzone, Flulaval, Fluarix, and 3 yrs and older Afluria) 09/11/2019    Influenza, Koleen Alyssa, Recombinant, IM PF (Flublok 18 yrs and older) 09/19/2018    Pneumococcal Conjugate 13-valent (Rosellen Sill) 08/06/2020    Tdap (Boostrix, Adacel) 05/22/2013    Zoster Live (Zostavax) 02/22/2016   ,   Health Maintenance   Topic Date Due    Shingles Vaccine (2 of 3) 04/18/2016    Cervical cancer screen  05/19/2017    Flu vaccine (1) 09/01/2020    Breast cancer screen  03/20/2021    A1C test (Diabetic or Prediabetic)  08/06/2021    Pneumococcal 65+ years Vaccine (2 of 2 - PPSV23) 08/06/2021    Lipid screen  09/08/2021    Potassium monitoring  09/08/2021    Creatinine monitoring  09/08/2021    DTaP/Tdap/Td vaccine (2 - Td) 05/22/2023    Colon cancer screen colonoscopy  09/08/2024    DEXA (modify frequency per FRAX score)  Completed    Hepatitis C screen  Completed    HIV screen  Completed    Hepatitis A vaccine  Aged Out    Hepatitis B vaccine  Aged Out    Hib vaccine  Aged Out    Meningococcal (ACWY) vaccine  Aged Out       PHYSICAL EXAMINATION:  [ INSTRUCTIONS:  \"[x]\" Indicates a positive item  \"[]\" Indicates a negative item  -- DELETE ALL ITEMS NOT EXAMINED]  Vital Signs: (As obtained by patient/caregiver or practitioner observation)    Blood pressure-  Heart rate-    Respiratory rate-    Temperature-  Pulse oximetry-     Constitutional: [x] Appears well-developed and well-nourished [x] No apparent distress      [] Abnormal-   Mental status  [x] Alert and awake  [] Oriented to person/place/time [x]Able to follow commands      Eyes:  EOM    [x]  Normal  [] Abnormal-  Sclera  [x]  Normal  [] Abnormal -         Discharge []  None visible  [] Abnormal -    HENT:   [x] Normocephalic, atraumatic.   [x] Abnormal   [] Mouth/Throat: Mucous membranes are moist.     External Ears [x] Normal  [] Abnormal-     Neck: [x] No visualized mass     Pulmonary/Chest: [x] Respiratory effort normal.  [x] No visualized signs of difficulty breathing or respiratory distress        [] Abnormal-      Musculoskeletal:   [] Normal gait with no signs of ataxia         [x] Normal range of motion of neck        [] Abnormal-       Neurological:        [x] No Facial Asymmetry (Cranial nerve 7 motor function) (limited exam to video visit)          [] No gaze palsy        [] Abnormal-         Skin:        [x] No significant exanthematous lesions or discoloration noted on facial skin         [] Abnormal-            Psychiatric:       [x] Normal Affect [] No Hallucinations        [] Abnormal-     Other pertinent observable physical exam findings-     ASSESSMENT/PLAN:  1. Encounter to establish care    2. Healthcare maintenance  Due for mammogram, Pap smear  Will check 5 vs 10 yr follow up with GI for colonoscopy (report says 5 years pending pathology)  DEXA ordered    3. Essential hypertension  Stable, c/w Losartan and Norvasc  Checks daily, call with levels consistently >140/90  - amLODIPine (NORVASC) 10 MG tablet; Take 1 tablet by mouth daily  Dispense: 90 tablet; Refill: 1    4. Other hyperlipidemia  C/w statin  The 10-year ASCVD risk score (Neo Haddad, et al., 2013) is: 8%    Values used to calculate the score:      Age: 72 years      Sex: Female      Is Non- : No      Diabetic: No      Tobacco smoker: No      Systolic Blood Pressure: 473 mmHg      Is BP treated: Yes      HDL Cholesterol: 53 mg/dL      Total Cholesterol: 167 mg/dL    5. Major depressive disorder with single episode, in full remission ([facility] [address]  Stable on Zoloft, wishes to wean in the near future. 6. Encounter for screening mammogram for malignant neoplasm of breast  - HOLA DIGITAL SCREEN W OR WO CAD BILATERAL; Future    7. Screening for osteoporosis  - DEXA BONE DENSITY AXIAL SKELETON; Future    8.  Disorder of bone, unspecified   - DEXA BONE DENSITY AXIAL SKELETON; Future      Return in about 6 months (around 4/9/2021) for 30 minute visit, follow up medications, follow up labs. Earnest Naik is a 72 y.o. female being evaluated by a Virtual Visit (video visit) encounter to address concerns as mentioned above. A caregiver was present when appropriate. Due to this being a TeleHealth encounter (During NKXLW-32 public health emergency), evaluation of the following organ systems was limited: Vitals/Constitutional/EENT/Resp/CV/GI//MS/Neuro/Skin/Heme-Lymph-Imm. Pursuant to the emergency declaration under the [address] [place] [address], [address] [place] [address] authority and the [employer] Act, this Virtual Visit was conducted with patient's (and/or legal guardian's) consent, to reduce the patient's risk of exposure to COVID-19 and provide necessary medical care. The patient (and/or legal guardian) has also been advised to contact this office for worsening conditions or problems, and seek emergency medical treatment and/or call 911 if deemed necessary. Services were provided through a video synchronous discussion virtually to substitute for in-person clinic visit. Patient and provider were located at their individual homes. --Slade Vidal MD on 10/9/2020 at 9:21 AM    An electronic signature was used to authenticate this note.

## 2021-01-19 DIAGNOSIS — F41.9 ANXIETY: ICD-10-CM

## 2021-01-19 DIAGNOSIS — E78.2 MIXED HYPERLIPIDEMIA: ICD-10-CM

## 2021-01-19 DIAGNOSIS — I10 ESSENTIAL HYPERTENSION: ICD-10-CM

## 2021-01-19 NOTE — TELEPHONE ENCOUNTER
Refill Request     Last Seen: 10/9/2020    Last Written: 6/23/2020 # 90 with 1 refill     Next Appointment:   No future appointments.     Sent [employer] message to patient requesting return call to schedule appointment      Requested Prescriptions     Pending Prescriptions Disp Refills    losartan (COZAAR) 100 MG tablet 90 tablet 1     Sig: TAKE 1 TABLET BY MOUTH ONCE DAILY    sertraline (ZOLOFT) 50 MG tablet 90 tablet 1     Sig: TAKE 1 TABLET BY MOUTH ONCE DAILY    lovastatin (MEVACOR) 40 MG tablet 90 tablet 1     Sig: TAKE 1 TABLET BY MOUTH NIGHTLY

## 2021-01-20 RX ORDER — LOVASTATIN 40 MG/1
TABLET ORAL
Qty: 90 TABLET | Refills: 1 | Status: SHIPPED | OUTPATIENT
Start: 2021-01-20 | End: 2021-05-10 | Stop reason: SDUPTHER

## 2021-01-20 RX ORDER — LOSARTAN POTASSIUM 100 MG/1
TABLET ORAL
Qty: 90 TABLET | Refills: 1 | Status: SHIPPED | OUTPATIENT
Start: 2021-01-20 | End: 2021-05-10 | Stop reason: SDUPTHER

## 2021-03-26 ENCOUNTER — TELEPHONE (OUTPATIENT)
Dept: FAMILY MEDICINE CLINIC | Age: 66
End: 2021-03-26

## 2021-03-26 DIAGNOSIS — Z13.820 SCREENING FOR OSTEOPOROSIS: ICD-10-CM

## 2021-03-26 DIAGNOSIS — Z78.0 POST-MENOPAUSE: Primary | ICD-10-CM

## 2021-03-26 NOTE — TELEPHONE ENCOUNTER
It looks like Dr. Chaim Roland ordered this in October, and associated it to 2 different diagnoses. I am not sure what else we could change it to. Did her insurance company have a recommendation?

## 2021-03-26 NOTE — TELEPHONE ENCOUNTER
Ok, I do not see any of that in her history. I would recommend routine f/u with Dr. Flo Wiley to discuss.

## 2021-03-26 NOTE — TELEPHONE ENCOUNTER
So in doing research medicare will only cover a dexa scan with Certain Dx. Codes   Here are the ones I found these codes:   78.0 Asymptomatic menopausal state  Z79.3 Long term (current) use of hormonal contraceptives  Z79.51 Long term (current) use of inhaled steroids  Z79.52 Long term (current) use of systemic steroids  Z79.83 Long term (current) use of bisphosphonates  Z87.310 Personal history of (healed) osteoporosis fracture  You will have to reorder the dexa scan with one of these dx: codes that fit.

## 2021-03-26 NOTE — TELEPHONE ENCOUNTER
Prabhakar Boyer called from central scheduling regarding the dexa scan. She states that they need an updated ICD-10 code for the scan; the one that is on it now is not passing through her insurance.

## 2021-04-06 ENCOUNTER — HOSPITAL ENCOUNTER (OUTPATIENT)
Dept: WOMENS IMAGING | Age: 66
Discharge: HOME | End: 2021-04-06
Payer: MEDICARE

## 2021-04-06 VITALS — BODY MASS INDEX: 30.28 KG/M2 | HEIGHT: 64 IN

## 2021-04-06 DIAGNOSIS — I10 ESSENTIAL HYPERTENSION: ICD-10-CM

## 2021-04-06 DIAGNOSIS — Z13.820 SCREENING FOR OSTEOPOROSIS: ICD-10-CM

## 2021-04-06 DIAGNOSIS — Z12.31 ENCOUNTER FOR SCREENING MAMMOGRAM FOR MALIGNANT NEOPLASM OF BREAST: ICD-10-CM

## 2021-04-06 DIAGNOSIS — Z78.0 POST-MENOPAUSE: ICD-10-CM

## 2021-04-06 PROCEDURE — 77080 DXA BONE DENSITY AXIAL: CPT

## 2021-04-06 PROCEDURE — 77067 SCR MAMMO BI INCL CAD: CPT

## 2021-04-06 RX ORDER — AMLODIPINE BESYLATE 10 MG/1
10 TABLET ORAL DAILY
Qty: 90 TABLET | Refills: 1 | Status: SHIPPED | OUTPATIENT
Start: 2021-04-06 | End: 2022-03-28 | Stop reason: ALTCHOICE

## 2021-04-06 NOTE — TELEPHONE ENCOUNTER
----- Message from Vania Rivas sent at 4/6/2021  1:17 PM EDT -----  Subject: Refill Request    QUESTIONS  Name of Medication? amLODIPine (NORVASC) 10 MG tablet  Patient-reported dosage and instructions? Take once a day  How many days do you have left? 14  Preferred Pharmacy? [address] phone number (if available)? [phone]  Additional Information for Provider? Patient has appointment for 5/10 but   needs the medicine refilled before then  ---------------------------------------------------------------------------  --------------  [address]  What is the best way for the office to contact you? OK to leave message on   voicemail  Preferred Call Back Phone Number?  [phone]

## 2021-04-06 NOTE — TELEPHONE ENCOUNTER
Refill Request     Last Seen: 10/9/2020    Last Written: 10/9/2020 with 1 refill     Next Appointment:   Future Appointments   Date Time Provider Primo Esteves   5/10/2021  5:00 PM José Trinidad MD [facility]       Future appointment scheduled

## 2021-05-10 ENCOUNTER — VIRTUAL VISIT (OUTPATIENT)
Dept: FAMILY MEDICINE CLINIC | Age: 66
End: 2021-05-10
Payer: MEDICARE

## 2021-05-10 DIAGNOSIS — I10 ESSENTIAL HYPERTENSION: ICD-10-CM

## 2021-05-10 DIAGNOSIS — M54.50 CHRONIC MIDLINE LOW BACK PAIN WITHOUT SCIATICA: ICD-10-CM

## 2021-05-10 DIAGNOSIS — G89.29 CHRONIC MIDLINE LOW BACK PAIN WITHOUT SCIATICA: ICD-10-CM

## 2021-05-10 DIAGNOSIS — F41.9 ANXIETY: ICD-10-CM

## 2021-05-10 DIAGNOSIS — J30.9 ALLERGIC RHINITIS, UNSPECIFIED SEASONALITY, UNSPECIFIED TRIGGER: ICD-10-CM

## 2021-05-10 DIAGNOSIS — E78.2 MIXED HYPERLIPIDEMIA: ICD-10-CM

## 2021-05-10 PROCEDURE — 99443 PR PHYS/QHP TELEPHONE EVALUATION 21-30 MIN: CPT | Performed by: FAMILY MEDICINE

## 2021-05-10 RX ORDER — LOSARTAN POTASSIUM 100 MG/1
TABLET ORAL
Qty: 90 TABLET | Refills: 1 | Status: SHIPPED | OUTPATIENT
Start: 2021-05-10 | End: 2022-01-20

## 2021-05-10 RX ORDER — AMLODIPINE BESYLATE 10 MG/1
10 TABLET ORAL DAILY
Qty: 90 TABLET | Refills: 1 | Status: CANCELLED | OUTPATIENT
Start: 2021-05-10

## 2021-05-10 RX ORDER — FLUTICASONE PROPIONATE 50 MCG
1 SPRAY, SUSPENSION (ML) NASAL DAILY
Qty: 1 BOTTLE | Refills: 0 | Status: SHIPPED | OUTPATIENT
Start: 2021-05-10 | End: 2022-04-11

## 2021-05-10 RX ORDER — LOVASTATIN 40 MG/1
TABLET ORAL
Qty: 90 TABLET | Refills: 1 | Status: SHIPPED | OUTPATIENT
Start: 2021-05-10 | End: 2022-01-20

## 2021-05-10 RX ORDER — IBUPROFEN 800 MG/1
TABLET ORAL
Qty: 180 TABLET | Refills: 2 | Status: SHIPPED | OUTPATIENT
Start: 2021-05-10 | End: 2022-07-29

## 2021-05-10 NOTE — PROGRESS NOTES
Dk Lopez is a 72 y.o. female evaluated via telephone on 4/17/2020. Consent:  She and/or health care decision maker is aware that that she may receive a bill for this telephone service, depending on her insurance coverage, and has provided verbal consent to proceed: Yes      Documentation:  I communicated with the patient and/or health care decision maker about:    HTN: tolerating Norvasc 10, Cozaar 100  Checking levels at home every few days, today was 120/80  120/80s at the dentist last week  + more ankle swelling with the amlodipine, requests to stop this medication   No acute concerning Sxs: No CP, SOB, palpitations, dizziness, HA, etc.     Moods: stable on Zoloft 50 mg  Would like to wean this in the future but not now  Started when she worked, now retired x5 years     HLD: tolerating Mevacor 40 mg, no new myalgias     Due for colonoscopy update in 2019, last in 2014 with 1 polyp removed      Details of this discussion including any medical advice provided:     1. Essential hypertension  Dec Norvasc to 5 mg daily (1/2 tab) due to increased ankle edema  Monitor Bps daily, call in 2 weeks if BP is consistently >140/90  Discussed edema reduction techniques in detail:  Elevated legs above the level of the heart, daily compression socks/hose daily, and increased muscle contraction of calf muscles. - losartan (COZAAR) 100 MG tablet; TAKE 1 TABLET BY MOUTH ONCE DAILY  Dispense: 90 tablet; Refill: 1    2. Mixed hyperlipidemia  The 10-year ASCVD risk score (Cecy Brock, et al., 2013) is: 8%    Values used to calculate the score:      Age: 72 years      Sex: Female      Is Non- : No      Diabetic: No      Tobacco smoker: No      Systolic Blood Pressure: 472 mmHg      Is BP treated: Yes      HDL Cholesterol: 53 mg/dL      Total Cholesterol: 167 mg/dL  - lovastatin (MEVACOR) 40 MG tablet; TAKE 1 TABLET BY MOUTH NIGHTLY  Dispense: 90 tablet; Refill: 1    3.  Chronic midline low back pain without sciatica  - ibuprofen (ADVIL;MOTRIN) 800 MG tablet; TAKE 1 TABLET EVERY 6 HOURSAS NEEDED  Dispense: 180 tablet; Refill: 2    4. Allergic rhinitis, unspecified seasonality, unspecified trigger  - fluticasone (FLONASE) 50 MCG/ACT nasal spray; 1 spray by Each Nostril route daily 1 Spray in each nostril  Dispense: 1 Bottle; Refill: 0    5. Anxiety  Stable, does not wish to wean at this time but will in the near future. - sertraline (ZOLOFT) 50 MG tablet; TAKE 1 TABLET BY MOUTH ONCE DAILY  Dispense: 90 tablet; Refill: 1    Labs due at annual physical in 6 mo  DEXA scan due in 2 years, discussed, reviewed previous results in detail     I affirm this is a Patient Initiated Episode with an Established Patient who has not had a related appointment within my department in the past 7 days or scheduled within the next 24 hours.     Total Time: minutes: 21-30 minutes    Note: not billable if this call serves to triage the patient into an appointment for the relevant concern    Dr. Nicholas Gorman MD  5/10/21

## 2022-02-14 DIAGNOSIS — E78.2 MIXED HYPERLIPIDEMIA: ICD-10-CM

## 2022-02-14 DIAGNOSIS — I10 ESSENTIAL HYPERTENSION: ICD-10-CM

## 2022-02-14 RX ORDER — LOVASTATIN 40 MG/1
TABLET ORAL
Qty: 30 TABLET | Refills: 0 | Status: SHIPPED | OUTPATIENT
Start: 2022-02-14 | End: 2022-05-10

## 2022-02-14 RX ORDER — LOSARTAN POTASSIUM 100 MG/1
TABLET ORAL
Qty: 30 TABLET | Refills: 0 | Status: SHIPPED | OUTPATIENT
Start: 2022-02-14 | End: 2022-03-04

## 2022-02-14 NOTE — TELEPHONE ENCOUNTER
Refill Request     Last Seen: Last Seen Department: 5/10/2021  Last Seen by PCP: 5/10/2021    Last Written: 1/20/2022, #30, 0 refills     Next Appointment:   Future Appointments   Date Time Provider Primo Esteves   3/28/2022  8:00 AM MD AMIE Pepe  [place] [facility]       Future appointment scheduled      Requested Prescriptions     Pending Prescriptions Disp Refills    lovastatin (MEVACOR) 40 MG tablet 30 tablet 0     Sig: TAKE ONE TABLET BY MOUTH ONCE NIGHTLY     Signed Prescriptions Disp Refills    losartan (COZAAR) 100 MG tablet 30 tablet 0     Sig: TAKE ONE TABLET BY MOUTH DAILY     Authorizing Provider: Ian Hollis

## 2022-02-14 NOTE — TELEPHONE ENCOUNTER
Refill Request     Last Seen: Last Seen Department: 5/10/2021  Last Seen by PCP: 5/10/2021    Last Written: 1/20/2022, #30, 0 refills     Next Appointment:   Future Appointments   Date Time Provider Primo Linn   3/28/2022  8:00 AM MD AMIE Choudhury  [place] [facility]       Future appointment scheduled      Requested Prescriptions     Pending Prescriptions Disp Refills    losartan (COZAAR) 100 MG tablet 30 tablet 0     Sig: TAKE ONE TABLET BY MOUTH DAILY

## 2022-03-04 DIAGNOSIS — I10 ESSENTIAL HYPERTENSION: ICD-10-CM

## 2022-03-04 RX ORDER — LOSARTAN POTASSIUM 100 MG/1
TABLET ORAL
Qty: 30 TABLET | Refills: 0 | Status: SHIPPED | OUTPATIENT
Start: 2022-03-04 | End: 2022-05-10

## 2022-03-04 NOTE — TELEPHONE ENCOUNTER
Refill Request     Last Seen: Last Seen Department: 5/10/2021  Last Seen by PCP: 5/10/2021    Last Written: 2/14/22 30 tablet 0 refill    Next Appointment: 3/28/22     Future Appointments   Date Time Provider Primo Esteves   3/28/2022  8:00 AM MD AMIE Franklin [place] [facility]       Future appointment scheduled      Requested Prescriptions     Pending Prescriptions Disp Refills    losartan (COZAAR) 100 MG tablet [Pharmacy Med Name: LOSARTAN POTASSIUM 100 MG TAB] 30 tablet 0     Sig: TAKE ONE TABLET BY MOUTH DAILY

## 2022-03-26 SDOH — HEALTH STABILITY: PHYSICAL HEALTH: ON AVERAGE, HOW MANY DAYS PER WEEK DO YOU ENGAGE IN MODERATE TO STRENUOUS EXERCISE (LIKE A BRISK WALK)?: 0 DAYS

## 2022-03-26 ASSESSMENT — LIFESTYLE VARIABLES
HOW OFTEN DO YOU HAVE SIX OR MORE DRINKS ON ONE OCCASION: 98
HOW OFTEN DO YOU HAVE A DRINK CONTAINING ALCOHOL: PATIENT DECLINED
HOW MANY STANDARD DRINKS CONTAINING ALCOHOL DO YOU HAVE ON A TYPICAL DAY: PATIENT DECLINED
HOW MANY STANDARD DRINKS CONTAINING ALCOHOL DO YOU HAVE ON A TYPICAL DAY: 98
HOW OFTEN DO YOU HAVE A DRINK CONTAINING ALCOHOL: 98

## 2022-03-26 ASSESSMENT — PATIENT HEALTH QUESTIONNAIRE - PHQ9
1. LITTLE INTEREST OR PLEASURE IN DOING THINGS: 0
SUM OF ALL RESPONSES TO PHQ QUESTIONS 1-9: 0
2. FEELING DOWN, DEPRESSED OR HOPELESS: 0
SUM OF ALL RESPONSES TO PHQ9 QUESTIONS 1 & 2: 0
SUM OF ALL RESPONSES TO PHQ QUESTIONS 1-9: 0

## 2022-03-28 ENCOUNTER — OFFICE VISIT (OUTPATIENT)
Dept: FAMILY MEDICINE CLINIC | Age: 67
End: 2022-03-28
Payer: MEDICARE

## 2022-03-28 VITALS
WEIGHT: 179.8 LBS | HEIGHT: 64 IN | SYSTOLIC BLOOD PRESSURE: 136 MMHG | DIASTOLIC BLOOD PRESSURE: 84 MMHG | BODY MASS INDEX: 30.7 KG/M2

## 2022-03-28 DIAGNOSIS — Z78.0 POSTMENOPAUSAL: ICD-10-CM

## 2022-03-28 DIAGNOSIS — R79.9 ABNORMAL FINDING OF BLOOD CHEMISTRY, UNSPECIFIED: ICD-10-CM

## 2022-03-28 DIAGNOSIS — F41.1 GAD (GENERALIZED ANXIETY DISORDER): ICD-10-CM

## 2022-03-28 DIAGNOSIS — I10 PRIMARY HYPERTENSION: ICD-10-CM

## 2022-03-28 DIAGNOSIS — E78.49 OTHER HYPERLIPIDEMIA: ICD-10-CM

## 2022-03-28 DIAGNOSIS — E66.09 CLASS 1 OBESITY DUE TO EXCESS CALORIES WITH SERIOUS COMORBIDITY AND BODY MASS INDEX (BMI) OF 30.0 TO 30.9 IN ADULT: ICD-10-CM

## 2022-03-28 DIAGNOSIS — D12.6 BENIGN NEOPLASM OF COLON, UNSPECIFIED PART OF COLON: ICD-10-CM

## 2022-03-28 DIAGNOSIS — E55.9 VITAMIN D DEFICIENCY: ICD-10-CM

## 2022-03-28 DIAGNOSIS — Z00.00 INITIAL MEDICARE ANNUAL WELLNESS VISIT: ICD-10-CM

## 2022-03-28 DIAGNOSIS — F32.5 MAJOR DEPRESSIVE DISORDER WITH SINGLE EPISODE, IN FULL REMISSION (HCC): ICD-10-CM

## 2022-03-28 DIAGNOSIS — Z00.00 INITIAL MEDICARE ANNUAL WELLNESS VISIT: Primary | ICD-10-CM

## 2022-03-28 LAB
A/G RATIO: 2.1 (ref 1.1–2.2)
ALBUMIN SERPL-MCNC: 4.8 G/DL (ref 3.4–5)
ALP BLD-CCNC: 52 U/L (ref 40–129)
ALT SERPL-CCNC: 43 U/L (ref 10–40)
ANION GAP SERPL CALCULATED.3IONS-SCNC: 17 MMOL/L (ref 3–16)
AST SERPL-CCNC: 24 U/L (ref 15–37)
BASOPHILS ABSOLUTE: 0 K/UL (ref 0–0.2)
BASOPHILS RELATIVE PERCENT: 1.1 %
BILIRUB SERPL-MCNC: 0.3 MG/DL (ref 0–1)
BUN BLDV-MCNC: 13 MG/DL (ref 7–20)
CALCIUM SERPL-MCNC: 10.4 MG/DL (ref 8.3–10.6)
CHLORIDE BLD-SCNC: 104 MMOL/L (ref 99–110)
CHOLESTEROL, TOTAL: 182 MG/DL (ref 0–199)
CO2: 20 MMOL/L (ref 21–32)
CREAT SERPL-MCNC: 0.5 MG/DL (ref 0.6–1.2)
EOSINOPHILS ABSOLUTE: 0.2 K/UL (ref 0–0.6)
EOSINOPHILS RELATIVE PERCENT: 5.6 %
GFR AFRICAN AMERICAN: >60
GFR NON-AFRICAN AMERICAN: >60
GLUCOSE BLD-MCNC: 127 MG/DL (ref 70–99)
HCT VFR BLD CALC: 43.3 % (ref 36–48)
HDLC SERPL-MCNC: 45 MG/DL (ref 40–60)
HEMOGLOBIN: 14.7 G/DL (ref 12–16)
LDL CHOLESTEROL CALCULATED: 103 MG/DL
LYMPHOCYTES ABSOLUTE: 1.7 K/UL (ref 1–5.1)
LYMPHOCYTES RELATIVE PERCENT: 38.7 %
MCH RBC QN AUTO: 31.4 PG (ref 26–34)
MCHC RBC AUTO-ENTMCNC: 33.9 G/DL (ref 31–36)
MCV RBC AUTO: 92.6 FL (ref 80–100)
MONOCYTES ABSOLUTE: 0.6 K/UL (ref 0–1.3)
MONOCYTES RELATIVE PERCENT: 12.5 %
NEUTROPHILS ABSOLUTE: 1.9 K/UL (ref 1.7–7.7)
NEUTROPHILS RELATIVE PERCENT: 42.1 %
PDW BLD-RTO: 13.2 % (ref 12.4–15.4)
PLATELET # BLD: 144 K/UL (ref 135–450)
PLATELET SLIDE REVIEW: ADEQUATE
PMV BLD AUTO: 10.3 FL (ref 5–10.5)
POTASSIUM SERPL-SCNC: 4.6 MMOL/L (ref 3.5–5.1)
RBC # BLD: 4.68 M/UL (ref 4–5.2)
RBC # BLD: NORMAL 10*6/UL
SLIDE REVIEW: NORMAL
SODIUM BLD-SCNC: 141 MMOL/L (ref 136–145)
TOTAL PROTEIN: 7.1 G/DL (ref 6.4–8.2)
TRIGL SERPL-MCNC: 172 MG/DL (ref 0–150)
TSH REFLEX: 2.17 UIU/ML (ref 0.27–4.2)
VITAMIN D 25-HYDROXY: 60.4 NG/ML
VLDLC SERPL CALC-MCNC: 34 MG/DL
WBC # BLD: 4.5 K/UL (ref 4–11)

## 2022-03-28 PROCEDURE — 3017F COLORECTAL CA SCREEN DOC REV: CPT | Performed by: FAMILY MEDICINE

## 2022-03-28 PROCEDURE — G8484 FLU IMMUNIZE NO ADMIN: HCPCS | Performed by: FAMILY MEDICINE

## 2022-03-28 PROCEDURE — 4040F PNEUMOC VAC/ADMIN/RCVD: CPT | Performed by: FAMILY MEDICINE

## 2022-03-28 PROCEDURE — 1123F ACP DISCUSS/DSCN MKR DOCD: CPT | Performed by: FAMILY MEDICINE

## 2022-03-28 PROCEDURE — G0438 PPPS, INITIAL VISIT: HCPCS | Performed by: FAMILY MEDICINE

## 2022-03-28 SDOH — ECONOMIC STABILITY: INCOME INSECURITY: IN THE LAST 12 MONTHS, WAS THERE A TIME WHEN YOU WERE NOT ABLE TO PAY THE MORTGAGE OR RENT ON TIME?: NO

## 2022-03-28 SDOH — ECONOMIC STABILITY: FOOD INSECURITY: WITHIN THE PAST 12 MONTHS, YOU WORRIED THAT YOUR FOOD WOULD RUN OUT BEFORE YOU GOT MONEY TO BUY MORE.: NEVER TRUE

## 2022-03-28 SDOH — ECONOMIC STABILITY: FOOD INSECURITY: WITHIN THE PAST 12 MONTHS, THE FOOD YOU BOUGHT JUST DIDN'T LAST AND YOU DIDN'T HAVE MONEY TO GET MORE.: NEVER TRUE

## 2022-03-28 SDOH — ECONOMIC STABILITY: HOUSING INSECURITY: IN THE LAST 12 MONTHS, HOW MANY PLACES HAVE YOU LIVED?: 1

## 2022-03-28 SDOH — ECONOMIC STABILITY: TRANSPORTATION INSECURITY
IN THE PAST 12 MONTHS, HAS THE LACK OF TRANSPORTATION KEPT YOU FROM MEDICAL APPOINTMENTS OR FROM GETTING MEDICATIONS?: NO

## 2022-03-28 SDOH — ECONOMIC STABILITY: TRANSPORTATION INSECURITY
IN THE PAST 12 MONTHS, HAS LACK OF TRANSPORTATION KEPT YOU FROM MEETINGS, WORK, OR FROM GETTING THINGS NEEDED FOR DAILY LIVING?: NO

## 2022-03-28 SDOH — ECONOMIC STABILITY: HOUSING INSECURITY
IN THE LAST 12 MONTHS, WAS THERE A TIME WHEN YOU DID NOT HAVE A STEADY PLACE TO SLEEP OR SLEPT IN A SHELTER (INCLUDING NOW)?: NO

## 2022-03-28 ASSESSMENT — SOCIAL DETERMINANTS OF HEALTH (SDOH): HOW HARD IS IT FOR YOU TO PAY FOR THE VERY BASICS LIKE FOOD, HOUSING, MEDICAL CARE, AND HEATING?: NOT HARD AT ALL

## 2022-03-28 ASSESSMENT — PATIENT HEALTH QUESTIONNAIRE - PHQ9
SUM OF ALL RESPONSES TO PHQ QUESTIONS 1-9: 0
1. LITTLE INTEREST OR PLEASURE IN DOING THINGS: 0
SUM OF ALL RESPONSES TO PHQ9 QUESTIONS 1 & 2: 0
SUM OF ALL RESPONSES TO PHQ QUESTIONS 1-9: 0
2. FEELING DOWN, DEPRESSED OR HOPELESS: 0

## 2022-03-28 ASSESSMENT — LIFESTYLE VARIABLES
HAVE YOU OR SOMEONE ELSE BEEN INJURED AS A RESULT OF YOUR DRINKING: 0
HOW OFTEN DURING THE LAST YEAR HAVE YOU BEEN UNABLE TO REMEMBER WHAT HAPPENED THE NIGHT BEFORE BECAUSE YOU HAD BEEN DRINKING: 0
HOW OFTEN DURING THE LAST YEAR HAVE YOU HAD A FEELING OF GUILT OR REMORSE AFTER DRINKING: 0
HAS A RELATIVE, FRIEND, DOCTOR, OR ANOTHER HEALTH PROFESSIONAL EXPRESSED CONCERN ABOUT YOUR DRINKING OR SUGGESTED YOU CUT DOWN: 0
HOW OFTEN DURING THE LAST YEAR HAVE YOU FOUND THAT YOU WERE NOT ABLE TO STOP DRINKING ONCE YOU HAD STARTED: 0
HOW OFTEN DO YOU HAVE A DRINK CONTAINING ALCOHOL: 2-4 TIMES A MONTH
HOW OFTEN DURING THE LAST YEAR HAVE YOU FAILED TO DO WHAT WAS NORMALLY EXPECTED FROM YOU BECAUSE OF DRINKING: 0
HOW MANY STANDARD DRINKS CONTAINING ALCOHOL DO YOU HAVE ON A TYPICAL DAY: 3 OR 4
HOW OFTEN DURING THE LAST YEAR HAVE YOU NEEDED AN ALCOHOLIC DRINK FIRST THING IN THE MORNING TO GET YOURSELF GOING AFTER A NIGHT OF HEAVY DRINKING: 0

## 2022-03-28 NOTE — PATIENT INSTRUCTIONS
Personalized Preventive Plan for Jose Kathleen - 3/28/2022  Medicare offers a range of preventive health benefits. Some of the tests and screenings are paid in full while other may be subject to a deductible, co-insurance, and/or copay. Some of these benefits include a comprehensive review of your medical history including lifestyle, illnesses that may run in your family, and various assessments and screenings as appropriate. After reviewing your medical record and screening and assessments performed today your provider may have ordered immunizations, labs, imaging, and/or referrals for you. A list of these orders (if applicable) as well as your Preventive Care list are included within your After Visit Summary for your review. Other Preventive Recommendations:    · A preventive eye exam performed by an eye specialist is recommended every 1-2 years to screen for glaucoma; cataracts, macular degeneration, and other eye disorders. · A preventive dental visit is recommended every 6 months. · Try to get at least 150 minutes of exercise per week or 10,000 steps per day on a pedometer . · Order or download the FREE \"Exercise & Physical Activity: Your Everyday Guide\" from [employer]. Call 6-964.352.1730 or search [employer] online. · You need 8091-6593 mg of calcium and 3010-7358 IU of vitamin D per day. It is possible to meet your calcium requirement with diet alone, but a vitamin D supplement is usually necessary to meet this goal.  · When exposed to the sun, use a sunscreen that protects against both UVA and UVB radiation with an SPF of 30 or greater. Reapply every 2 to 3 hours or after sweating, drying off with a towel, or swimming. · Always wear a seat belt when traveling in a car. Always wear a helmet when riding a bicycle or motorcycle.   Patient Education        DASH Diet: Care Instructions  Your Care Instructions     The DASH diet is an eating plan that can help lower your blood pressure. DASH stands for Dietary Approaches to Stop Hypertension. Hypertension is high blood pressure. The DASH diet focuses on eating foods that are high in calcium, potassium, and magnesium. These nutrients can lower blood pressure. The foods that are highest in these nutrients are fruits, vegetables, low-fat dairy products, nuts, seeds, and legumes. But taking calcium, potassium, and magnesium supplements instead of eating foods that are high in those nutrients does not have the same effect. The DASH diet also includes whole grains, fish, and poultry. The DASH diet is one of several lifestyle changes your doctor may recommend to lower your high blood pressure. Your doctor may also want you to decrease the amount of sodium in your diet. Lowering sodium while following the DASH diet can lower blood pressure even further than just the DASH diet alone. Follow-up care is a key part of your treatment and safety. Be sure to make and go to all appointments, and call your doctor if you are having problems. It's also a good idea to know your test results and keep a list of the medicines you take. How can you care for yourself at home? Following the DASH diet  Eat 4 to 5 servings of fruit each day. A serving is 1 medium-sized piece of fruit, ½ cup chopped or canned fruit, 1/4 cup dried fruit, or 4 ounces (½ cup) of fruit juice. Choose fruit more often than fruit juice. Eat 4 to 5 servings of vegetables each day. A serving is 1 cup of lettuce or raw leafy vegetables, ½ cup of chopped or cooked vegetables, or 4 ounces (½ cup) of vegetable juice. Choose vegetables more often than vegetable juice. Get 2 to 3 servings of low-fat and fat-free dairy each day. A serving is 8 ounces of milk, 1 cup of yogurt, or 1 ½ ounces of cheese. Eat 6 to 8 servings of grains each day.  A serving is 1 slice of bread, 1 ounce of dry cereal, or ½ cup of cooked rice, pasta, or cooked cereal. Try to choose whole-grain products as much as possible. Limit lean meat, poultry, and fish to 2 servings each day. A serving is 3 ounces, about the size of a deck of cards. Eat 4 to 5 servings of nuts, seeds, and legumes (cooked dried beans, lentils, and split peas) each week. A serving is 1/3 cup of nuts, 2 tablespoons of seeds, or ½ cup of cooked beans or peas. Limit fats and oils to 2 to 3 servings each day. A serving is 1 teaspoon of vegetable oil or 2 tablespoons of salad dressing. Limit sweets and added sugars to 5 servings or less a week. A serving is 1 tablespoon jelly or jam, ½ cup sorbet, or 1 cup of lemonade. Eat less than 2,300 milligrams (mg) of sodium a day. If you limit your sodium to 1,500 mg a day, you can lower your blood pressure even more. Be aware that all of these are the suggested number of servings for people who eat 1,800 to 2,000 calories a day. Your recommended number of servings may be different if you need more or fewer calories. Tips for success  Start small. Do not try to make dramatic changes to your diet all at once. You might feel that you are missing out on your favorite foods and then be more likely to not follow the plan. Make small changes, and stick with them. Once those changes become habit, add a few more changes. Try some of the following:  Make it a goal to eat a fruit or vegetable at every meal and at snacks. This will make it easy to get the recommended amount of fruits and vegetables each day. Try yogurt topped with fruit and nuts for a snack or healthy dessert. Add lettuce, tomato, cucumber, and onion to sandwiches. Combine a ready-made pizza crust with low-fat mozzarella cheese and lots of vegetable toppings. Try using tomatoes, squash, spinach, broccoli, carrots, cauliflower, and onions. Have a variety of cut-up vegetables with a low-fat dip as an appetizer instead of chips and dip. Sprinkle sunflower seeds or chopped almonds over salads.  Or try adding chopped walnuts or almonds to cooked vegetables. Try some vegetarian meals using beans and peas. Add garbanzo or kidney beans to salads. Make burritos and tacos with mashed mohan beans or black beans. Where can you learn more? Go to https://[email or web address]. org and sign in to your [employer] account. Enter J427 in the [facility] box to learn more about \"DASH Diet: Care Instructions. \"     If you do not have an account, please click on the \"Sign Up Now\" link. Current as of: April 29, 2021               Content Version: 13.1  © 6901-8050 [employer], [employer]. Care instructions adapted under license by [facility] ([facility]). If you have questions about a medical condition or this instruction, always ask your healthcare professional. [address] any warranty or liability for your use of this information.

## 2022-03-28 NOTE — PROGRESS NOTES
Medicare Annual Wellness Visit    Amalia Montero is here for Medicare AWV    Assessment & Plan   Initial Medicare annual wellness visit  -     Colby iLra MD, Ophthalmology, [facility]  -     Comprehensive Metabolic Panel; Future  -     CBC with Auto Differential; Future  -     Hemoglobin A1C; Future  -     Lipid Panel; Future  -     Vitamin D 25 Hydroxy; Future  -     TSH with Reflex; Future  -     AFL - Cindy Jurado MD, Gastroenterology, [facility]  Benign neoplasm of colon, unspecified part of colon  -     [facility]  Major depressive disorder with single episode, in full remission ([facility] [address]  -     Vitamin D 25 Hydroxy; Future  -     TSH with Reflex; Future  Primary hypertension  -     Comprehensive Metabolic Panel; Future  -     CBC with Auto Differential; Future  -     TSH with Reflex; Future  Other hyperlipidemia  -     Lipid Panel; Future  BERNIE (generalized anxiety disorder)  -     TSH with Reflex; Future  Class 1 obesity due to excess calories with serious comorbidity and body mass index (BMI) of 30.0 to 30.9 in adult  -     Comprehensive Metabolic Panel; Future  -     Hemoglobin A1C; Future  -     Lipid Panel; Future  -     TSH with Reflex; Future  Postmenopausal  -     Vitamin D 25 Hydroxy; Future  -     TSH with Reflex; Future  Vitamin D deficiency  -     Vitamin D 25 Hydroxy; Future  Abnormal finding of blood chemistry, unspecified   -     Hemoglobin A1C; Future      Recommendations for Preventive Services Due: see orders and patient instructions/AVS.  Recommended screening schedule for the next 5-10 years is provided to the patient in written form: see Patient Instructions/AVS.     Return for Medicare Annual Wellness Visit in 1 year. Subjective   The following acute and/or chronic problems were also addressed today:    HTN: self- reduced Norvasc to 5 mg, levels reportedly normal range at home. LE edema improved after.   Requests to stop completely and monitor closely daily, inc daily exercise, modify diet. DASH h/o given. Labs today     Depression: situational, self-reduced Zoloft to 25 mg. Encouraged her to see Dr. Jus Reyes. DEXA due 2023  Mammo due 4/22  Strongly encouraged colonoscopy f/u, was due 2019     Patient's complete Health Risk Assessment and screening values have been reviewed and are found in Flowsheets. The following problems were reviewed today and where indicated follow up appointments were made and/or referrals ordered. Positive Risk Factor Screenings with Interventions:        Alcohol Screening:  AUDIT Total Score: 4    A score of 8 or more is associated with harmful or hazardous drinking. A score of 13 or more in women, and 15 or more in men, is likely to indicate alcohol dependence.     Substance Abuse - Alcohol Interventions:  educational materials provided          General Health and ACP:  General  In general, how would you say your health is?: Good  In the past 7 days, have you experienced any of the following: New or Increased Pain, New or Increased Fatigue, Loneliness, Social Isolation, Stress or Anger?: No  Do you get the social and emotional support that you need?: Yes  Do you have a Living Will?: Yes    Advance Directives     Power of  Living Will ACP-Advance Directive ACP-Power of     Not on File Not on File Not on File Not on File      General Health Risk Interventions:  · No Living Will: Advance Care Planning addressed with patient today    Health Habits/Nutrition:     Physical Activity: Inactive    Days of Exercise per Week: 0 days    Minutes of Exercise per Session: 0 min     Have you lost any weight without trying in the past 3 months?: No  Body mass index: (!) 30.86  Have you seen the dentist within the past year?: (!) No    Health Habits/Nutrition Interventions:  · Dental exam overdue:  patient encouraged to make appointment with his/her dentist    Hearing/Vision:  Do you or your family notice any trouble with your hearing that hasn't been managed with hearing aids?: No  Do you have difficulty driving, watching TV, or doing any of your daily activities because of your eyesight?: No  Have you had an eye exam within the past year?: (!) No  No exam data present    Hearing/Vision Interventions:  · Vision concerns:  ophthalmology/optometry referral provided    Safety:  Do you have working smoke detectors?: Yes  Do you have any tripping hazards - loose or unsecured carpets or rugs?: No  Do you have any tripping hazards - clutter in doorways, halls, or stairs?: No  Do you have either shower bars, grab bars, non-slip mats or non-slip surfaces in your shower or bathtub?: (!) No  Do all of your stairways have a railing or banister?: Yes  Do you always fasten your seatbelt when you are in a car?: Yes    Safety Interventions:  · Home safety tips provided           Objective   Vitals:    03/28/22 0800   BP: 136/84   Site: Left Upper Arm   Position: Sitting   Cuff Size: Large Adult   Weight: 179 lb 12.8 oz (81.6 kg)   Height: 5' 4\" (1.626 m)      Body mass index is 30.86 kg/m².         General Appearance: alert and oriented to person, place and time, well developed and well- nourished, in no acute distress  Skin: warm and dry, no rash or erythema  Head: normocephalic and atraumatic  Eyes: pupils equal, round, and reactive to light, extraocular eye movements intact, conjunctivae normal  Neck: supple and non-tender without mass, no thyromegaly or thyroid nodules, no cervical lymphadenopathy  Pulmonary/Chest: clear to auscultation bilaterally- no wheezes, rales or rhonchi, normal air movement, no respiratory distress  Cardiovascular: normal rate, regular rhythm, normal S1 and S2, no murmurs, rubs, clicks, or gallops, distal pulses intact, no carotid bruits  Abdomen: soft, non-tender, non-distended, normal bowel sounds, no masses or organomegaly  Extremities: no cyanosis, clubbing or edema       No Known Allergies  Prior to Visit Medications    Medication Sig Taking?  Authorizing Provider   Multiple Vitamin (MULTIVITAMINS PO) Take by mouth Yes Historical Provider, MD   losartan (COZAAR) 100 MG tablet TAKE ONE TABLET BY MOUTH DAILY Yes Paula Snider MD   lovastatin (MEVACOR) 40 MG tablet TAKE ONE TABLET BY MOUTH ONCE NIGHTLY Yes Abbi Swan, APRN - CNP   sertraline (ZOLOFT) 50 MG tablet TAKE ONE TABLET BY MOUTH DAILY  Patient taking differently: 25 mg TAKE ONE TABLET BY MOUTH DAILY Yes Paula Snider MD   ibuprofen (ADVIL;MOTRIN) 800 MG tablet TAKE 1 TABLET EVERY 6 [place] Francis Yes Paula Snider MD   fluticasone (FLONASE) 50 MCG/ACT nasal spray 1 spray by Each Nostril route daily 1 Spray in each nostril Yes Paula Snider MD   aspirin 81 MG chewable tablet Take 81 mg by mouth Yes Historical Provider, MD   Coenzyme Q10 10 MG CAPS Take 10 mg by mouth Yes Historical Provider, MD   Calcium Carb-Cholecalciferol (CALCIUM CARBONATE-VITAMIN D3 PO) Take by mouth Yes Historical Provider, MD Celeste (Including outside providers/suppliers regularly involved in providing care):   Patient Care Team:  Paula Snider MD as PCP - General (Family Medicine)  Paula Snider MD as PCP - [facility] Empaneled Provider    Reviewed and updated this visit:  Tobacco  Allergies  Meds  Med Hx  Surg Hx  Soc Hx  Fam Hx

## 2022-03-29 LAB
ESTIMATED AVERAGE GLUCOSE: 116.9 MG/DL
HBA1C MFR BLD: 5.7 %

## 2022-04-08 DIAGNOSIS — J30.9 ALLERGIC RHINITIS, UNSPECIFIED SEASONALITY, UNSPECIFIED TRIGGER: ICD-10-CM

## 2022-04-11 RX ORDER — FLUTICASONE PROPIONATE 50 MCG
SPRAY, SUSPENSION (ML) NASAL
Qty: 1 EACH | Refills: 2 | Status: SHIPPED | OUTPATIENT
Start: 2022-04-11

## 2022-04-11 NOTE — TELEPHONE ENCOUNTER
Refill Request     Last Seen: Last Seen Department: 3/28/2022  Last Seen by PCP: 3/28/2022    Last Written: 5/10/21     Next Appointment:   No future appointments.       Requested Prescriptions     Pending Prescriptions Disp Refills    fluticasone (FLONASE) 50 MCG/ACT nasal spray [Pharmacy Med Name: FLUTICASONE PROP 50 MCG SPRAY]       Sig: SPRAY ONE SPRAY IN EACH NOSTRIL ONCE DAILY

## 2022-05-09 DIAGNOSIS — I10 ESSENTIAL HYPERTENSION: ICD-10-CM

## 2022-05-09 DIAGNOSIS — E78.2 MIXED HYPERLIPIDEMIA: ICD-10-CM

## 2022-05-10 RX ORDER — LOVASTATIN 40 MG/1
TABLET ORAL
Qty: 30 TABLET | Refills: 0 | Status: SHIPPED | OUTPATIENT
Start: 2022-05-10 | End: 2022-06-06 | Stop reason: SDUPTHER

## 2022-05-10 RX ORDER — LOSARTAN POTASSIUM 100 MG/1
TABLET ORAL
Qty: 30 TABLET | Refills: 0 | Status: SHIPPED | OUTPATIENT
Start: 2022-05-10 | End: 2022-06-06 | Stop reason: SDUPTHER

## 2022-05-10 NOTE — TELEPHONE ENCOUNTER
Refill Request     CONFIRM preferrred pharmacy with the patient. If Mail Order Rx - Pend for 90 day refill. Last Seen: Last Seen Department: 3/28/2022  Last Seen by PCP: Visit date not found    Last Written:   Lovastatin- 02/14/2022 30 Tablet 0 Refills  Losartan- 03/04/2022 30 Tablet 0 Refills      Next Appointment:   No future appointments. Message to [facility] to schedule appointment. Return for Medicare Annual Wellness Visit in 1 year.           Requested Prescriptions     Pending Prescriptions Disp Refills    lovastatin (MEVACOR) 40 MG tablet [Pharmacy Med Name: LOVASTATIN 40 MG TABLET] 30 tablet 0     Sig: TAKE ONE TABLET BY MOUTH ONCE NIGHTLY    losartan (COZAAR) 100 MG tablet [Pharmacy Med Name: LOSARTAN POTASSIUM 100 MG TAB] 30 tablet 0     Sig: TAKE ONE TABLET BY MOUTH DAILY

## 2022-05-10 NOTE — TELEPHONE ENCOUNTER
LM to inform patient medications have been refilled; also advised she is due for AWV after 3-28-23 and please call to schedule

## 2022-05-20 ENCOUNTER — HOSPITAL ENCOUNTER (OUTPATIENT)
Dept: WOMENS IMAGING | Age: 67
Discharge: HOME | End: 2022-05-20
Payer: MEDICARE

## 2022-05-20 DIAGNOSIS — Z12.31 ENCOUNTER FOR SCREENING MAMMOGRAM FOR BREAST CANCER: ICD-10-CM

## 2022-05-20 PROCEDURE — 77067 SCR MAMMO BI INCL CAD: CPT

## 2022-06-06 ENCOUNTER — PATIENT MESSAGE (OUTPATIENT)
Dept: FAMILY MEDICINE CLINIC | Age: 67
End: 2022-06-06

## 2022-06-06 DIAGNOSIS — I10 ESSENTIAL HYPERTENSION: ICD-10-CM

## 2022-06-06 DIAGNOSIS — E78.2 MIXED HYPERLIPIDEMIA: ICD-10-CM

## 2022-06-06 RX ORDER — LOVASTATIN 40 MG/1
TABLET ORAL
Qty: 90 TABLET | Refills: 1 | Status: SHIPPED | OUTPATIENT
Start: 2022-06-06

## 2022-06-06 RX ORDER — LOSARTAN POTASSIUM 100 MG/1
TABLET ORAL
Qty: 90 TABLET | Refills: 1 | Status: SHIPPED
Start: 2022-06-06 | End: 2022-07-29

## 2022-06-06 NOTE — TELEPHONE ENCOUNTER
.  Refill Request     CONFIRM preferrred pharmacy with the patient. If Mail Order Rx - Pend for 90 day refill.       Last Seen: Last Seen Department: 3/28/2022  Last Seen by PCP: 3/28/2022    Last Written: 5-10-22 30 with 0     Next Appointment:   Future Appointments   Date Time Provider Primo Esteves   3/30/2023  9:00 AM MD AMIE Jama  [place] [facility]       Future appointment scheduled      Requested Prescriptions     Pending Prescriptions Disp Refills    lovastatin (MEVACOR) 40 MG tablet 90 tablet 1     Sig: TAKE ONE TAB BY MOUTH ONCE NIGHTLY    losartan (COZAAR) 100 MG tablet 90 tablet 1     Sig: TAKE ONE TAB BY MOUTH DAILY

## 2022-06-06 NOTE — TELEPHONE ENCOUNTER
From: Aroldo Wilkinson  To: Dr. Estrella Kumar: 6/6/2022 3:26 PM EDT  Subject: Prescription refills    I am going to call [address] [place] [address] to refill two prescriptions. [place]ia [place] losartan and lovastatin. The last few refills have been for 30 day supply with no refills but was wondering if I could get 90 day supply for both.

## 2022-06-17 ENCOUNTER — HOSPITAL ENCOUNTER (OUTPATIENT)
Age: 67
Discharge: HOME | End: 2022-06-17
Payer: MEDICARE

## 2022-06-17 ENCOUNTER — OFFICE VISIT (OUTPATIENT)
Dept: FAMILY MEDICINE CLINIC | Age: 67
End: 2022-06-17
Payer: MEDICARE

## 2022-06-17 ENCOUNTER — HOSPITAL ENCOUNTER (OUTPATIENT)
Dept: GENERAL RADIOLOGY | Age: 67
Discharge: HOME | End: 2022-06-17
Payer: MEDICARE

## 2022-06-17 VITALS
SYSTOLIC BLOOD PRESSURE: 120 MMHG | DIASTOLIC BLOOD PRESSURE: 88 MMHG | HEART RATE: 100 BPM | WEIGHT: 179.4 LBS | OXYGEN SATURATION: 96 % | BODY MASS INDEX: 30.79 KG/M2

## 2022-06-17 DIAGNOSIS — R10.30 LOWER ABDOMINAL PAIN: ICD-10-CM

## 2022-06-17 DIAGNOSIS — R10.30 LOWER ABDOMINAL PAIN: Primary | ICD-10-CM

## 2022-06-17 PROCEDURE — 1123F ACP DISCUSS/DSCN MKR DOCD: CPT | Performed by: FAMILY MEDICINE

## 2022-06-17 PROCEDURE — 3017F COLORECTAL CA SCREEN DOC REV: CPT | Performed by: FAMILY MEDICINE

## 2022-06-17 PROCEDURE — G8417 CALC BMI ABV UP PARAM F/U: HCPCS | Performed by: FAMILY MEDICINE

## 2022-06-17 PROCEDURE — 74019 RADEX ABDOMEN 2 VIEWS: CPT

## 2022-06-17 PROCEDURE — 99213 OFFICE O/P EST LOW 20 MIN: CPT | Performed by: FAMILY MEDICINE

## 2022-06-17 PROCEDURE — 1090F PRES/ABSN URINE INCON ASSESS: CPT | Performed by: FAMILY MEDICINE

## 2022-06-17 PROCEDURE — 1036F TOBACCO NON-USER: CPT | Performed by: FAMILY MEDICINE

## 2022-06-17 PROCEDURE — G8427 DOCREV CUR MEDS BY ELIG CLIN: HCPCS | Performed by: FAMILY MEDICINE

## 2022-06-17 PROCEDURE — G8399 PT W/DXA RESULTS DOCUMENT: HCPCS | Performed by: FAMILY MEDICINE

## 2022-06-17 ASSESSMENT — ENCOUNTER SYMPTOMS
RECTAL PAIN: 0
SHORTNESS OF BREATH: 0
NAUSEA: 0
ANAL BLEEDING: 0
BLOOD IN STOOL: 0
DIARRHEA: 0
ABDOMINAL DISTENTION: 1
VOMITING: 0
ABDOMINAL PAIN: 1
CONSTIPATION: 0

## 2022-06-17 NOTE — PROGRESS NOTES
2022    This is a 79 y.o. female who presents for  Chief Complaint   Patient presents with    Pain     stomach pain for 2 weeks, new symptoms, no N/V       HPI:     Lower central abd pain  X few weeks  Feels hard in the top   Bloating, intermittent  \"feels funny\"  Can be related or not  Nagging pain as well  No triggers for the pain   BMs: \"ok,\" hard, cyclinder,   No BRBR, melena, mucus  Appetite has been more diminished   More nervous about eating   Had a chin with pain one night, woke her up  No n/v  + WG   No urinary complaints  No vaginal bleeding, discharge   No sexual partners      Past Medical History:   Diagnosis Date    Anxiety     Hypertension        Past Surgical History:   Procedure Laterality Date    COLONOSCOPY  14    colon polyp removed, diverticulosis       Social History     Socioeconomic History    Marital status: Single     Spouse name: Not on file    Number of children: Not on file    Years of education: 15    Highest education level: Not on file   Occupational History    Occupation: retired   Tobacco Use    Smoking status: Former Smoker     Packs/day: 0.50     Years: 20.00     Pack years: 10.00     Types: Cigarettes     Quit date: 5/3/1996     Years since quittin.1    Smokeless tobacco: Never Used   Substance and Sexual Activity    Alcohol use: Yes     Alcohol/week: 2.0 standard drinks     Types: 2 Cans of beer per week    Drug use: No    Sexual activity: Not Currently     Partners: Male   Other Topics Concern    Not on file   Social History Narrative    Not on file     Social Determinants of Health     Financial Resource Strain: Low Risk     Difficulty of Paying Living Expenses: Not hard at all   Food Insecurity: No Food Insecurity    Worried About [address] in the Last Year: Never true    [address] in the Last Year: Never true   Transportation Needs: No Transportation Needs    Lack of Transportation (Medical):  No    Lack of Transportation (Non-Medical):  No   Physical Activity: Inactive    Days of Exercise per Week: 0 days    Minutes of Exercise per Session: 0 min   Stress:     Feeling of Stress : Not on file   Social Connections:     Frequency of Communication with Friends and Family: Not on file    Frequency of Social Gatherings with Friends and Family: Not on file    Attends [employer] Services: Not on file    Active Member of Clubs or Organizations: Not on file    Attends Club or Organization Meetings: Not on file    Marital Status: Not on file   Intimate Partner Violence:     Fear of Current or Ex-Partner: Not on file    Emotionally Abused: Not on file    Physically Abused: Not on file    Sexually Abused: Not on file   Housing Stability: [address] Unable to Pay for Housing in the Last Year: No    Number of Places Lived in the Last Year: 1    Unstable Housing in the Last Year: No       Family History   Problem Relation Age of Onset    Cancer Mother         Pancreatic Cancer    Diabetes Mother     High Blood Pressure Mother     Breast Cancer Maternal Aunt     Breast Cancer Maternal Cousin        Current Outpatient Medications   Medication Sig Dispense Refill    lovastatin (MEVACOR) 40 MG tablet TAKE ONE TAB BY MOUTH ONCE NIGHTLY 90 tablet 1    losartan (COZAAR) 100 MG tablet TAKE ONE TAB BY MOUTH DAILY 90 tablet 1    fluticasone (FLONASE) 50 MCG/ACT nasal spray SPRAY ONE SPRAY IN EACH NOSTRIL ONCE DAILY 1 each 2    Multiple Vitamin (MULTIVITAMINS PO) Take by mouth      sertraline (ZOLOFT) 50 MG tablet TAKE ONE TABLET BY MOUTH DAILY (Patient taking differently: 25 mg TAKE ONE TABLET BY MOUTH DAILY) 90 tablet 1    ibuprofen (ADVIL;MOTRIN) 800 MG tablet TAKE 1 TABLET EVERY 6 HOURSAS NEEDED 180 tablet 2    aspirin 81 MG chewable tablet Take 81 mg by mouth      Coenzyme Q10 10 MG CAPS Take 10 mg by mouth      Calcium Carb-Cholecalciferol (CALCIUM CARBONATE-VITAMIN D3 PO) Take by mouth       No current facility-administered medications for this visit. Immunization History   Administered Date(s) Administered    Influenza Virus Vaccine 10/20/2020    Influenza, Intradermal, Quadrivalent, Preservative Free 11/01/2017    Influenza, Quadv, IM, PF (6 mo and older Fluzone, Flulaval, Fluarix, and 3 yrs and older Afluria) 09/11/2019    Influenza, Quadv, Recombinant, IM PF (Flublok 18 yrs and older) 09/19/2018    Pneumococcal Conjugate 13-valent (Elige Sleight) 08/06/2020    Tdap (Boostrix, Adacel) 05/22/2013    Zoster Live (Zostavax) 02/22/2016       No Known Allergies    Review of Systems   Constitutional: Negative for activity change, appetite change, chills, diaphoresis, fatigue, fever and unexpected weight change. Respiratory: Negative for shortness of breath. Cardiovascular: Negative for chest pain. Gastrointestinal: Positive for abdominal distention and abdominal pain. Negative for anal bleeding, blood in stool, constipation, diarrhea, nausea, rectal pain and vomiting. Genitourinary: Negative for decreased urine volume, difficulty urinating, dysuria, flank pain, frequency, hematuria, pelvic pain, urgency, vaginal bleeding, vaginal discharge and vaginal pain. Musculoskeletal: Negative for arthralgias. Skin: Negative for rash. Allergic/Immunologic: Negative for immunocompromised state. Neurological: Negative for dizziness, light-headedness and headaches. Psychiatric/Behavioral: Negative for sleep disturbance. /88 (Site: Right Upper Arm, Position: Sitting, Cuff Size: Medium Adult)   Pulse 100   Wt 179 lb 6.4 oz (81.4 kg)   LMP  (LMP Unknown)   SpO2 96%   BMI 30.79 kg/m²     Physical Exam  Vitals and nursing note reviewed. Constitutional:       General: She is not in acute distress. Appearance: She is well-developed. She is not diaphoretic. HENT:      Head: Normocephalic and atraumatic. Eyes:      Pupils: Pupils are equal, round, and reactive to light. Cardiovascular:      Rate and Rhythm: Normal rate and regular rhythm. Pulmonary:      Effort: Pulmonary effort is normal. No respiratory distress. Breath sounds: Normal breath sounds. Abdominal:      General: Bowel sounds are normal. There is no distension. Palpations: Abdomen is soft. There is no mass. Tenderness: There is no abdominal tenderness. There is no guarding or rebound. Hernia: No hernia is present. Musculoskeletal:         General: Normal range of motion. Cervical back: Normal range of motion and neck supple. Skin:     General: Skin is warm and dry. Capillary Refill: Capillary refill takes 2 to 3 seconds. Coloration: Skin is not pale. Findings: No erythema or rash. Neurological:      Mental Status: She is alert. Psychiatric:         Behavior: Behavior normal.         Thought Content: Thought content normal.         Judgment: Judgment normal.         Plan  1. Lower abdominal pain  C/w Gas X PRN and start a journal of triggers  UA, pelvic exam not indicated today based on Hx  Strongly encouraged Colonoscopy f/u, will schedule  XR to assess stool load  If all reassuring, will order CT abd/pelvis   - XR ABDOMEN (2 VIEWS); Future        While assessing care for this patient, I have reviewed all pertinent lab work/imaging/ specialist notes and care in reference to those problems addressed above in detail. Appropriate medical decision making was based on this. Please note that portions of this note may have been completed with a voice recognition program. Efforts were made to edit the dictations but occasionally words are mis-transcribed. Return if symptoms worsen or fail to improve.

## 2022-07-07 ENCOUNTER — OFFICE VISIT (OUTPATIENT)
Dept: FAMILY MEDICINE CLINIC | Age: 67
End: 2022-07-07
Payer: MEDICARE

## 2022-07-07 VITALS
WEIGHT: 179 LBS | BODY MASS INDEX: 30.73 KG/M2 | OXYGEN SATURATION: 98 % | HEART RATE: 106 BPM | DIASTOLIC BLOOD PRESSURE: 72 MMHG | SYSTOLIC BLOOD PRESSURE: 126 MMHG

## 2022-07-07 DIAGNOSIS — R10.84 DIFFUSE ABDOMINAL PAIN: Primary | ICD-10-CM

## 2022-07-07 DIAGNOSIS — E78.2 MIXED HYPERLIPIDEMIA: ICD-10-CM

## 2022-07-07 DIAGNOSIS — R10.84 DIFFUSE ABDOMINAL PAIN: ICD-10-CM

## 2022-07-07 LAB
A/G RATIO: 1.8 (ref 1.1–2.2)
ALBUMIN SERPL-MCNC: 4.1 G/DL (ref 3.4–5)
ALP BLD-CCNC: 60 U/L (ref 40–129)
ALT SERPL-CCNC: 24 U/L (ref 10–40)
ANION GAP SERPL CALCULATED.3IONS-SCNC: 15 MMOL/L (ref 3–16)
AST SERPL-CCNC: 64 U/L (ref 15–37)
BASOPHILS ABSOLUTE: 0.1 K/UL (ref 0–0.2)
BASOPHILS RELATIVE PERCENT: 0.7 %
BILIRUB SERPL-MCNC: 0.5 MG/DL (ref 0–1)
BUN BLDV-MCNC: 10 MG/DL (ref 7–20)
CALCIUM SERPL-MCNC: 9.7 MG/DL (ref 8.3–10.6)
CHLORIDE BLD-SCNC: 104 MMOL/L (ref 99–110)
CO2: 21 MMOL/L (ref 21–32)
CREAT SERPL-MCNC: 0.6 MG/DL (ref 0.6–1.2)
EOSINOPHILS ABSOLUTE: 0.1 K/UL (ref 0–0.6)
EOSINOPHILS RELATIVE PERCENT: 1.4 %
FOLATE: >20 NG/ML (ref 4.78–24.2)
GFR AFRICAN AMERICAN: >60
GFR NON-AFRICAN AMERICAN: >60
GLUCOSE BLD-MCNC: 110 MG/DL (ref 70–99)
HCT VFR BLD CALC: 40 % (ref 36–48)
HEMOGLOBIN: 13.6 G/DL (ref 12–16)
LIPASE: 29 U/L (ref 13–60)
LYMPHOCYTES ABSOLUTE: 1.1 K/UL (ref 1–5.1)
LYMPHOCYTES RELATIVE PERCENT: 13.3 %
MAGNESIUM: 2.1 MG/DL (ref 1.8–2.4)
MCH RBC QN AUTO: 30.7 PG (ref 26–34)
MCHC RBC AUTO-ENTMCNC: 33.9 G/DL (ref 31–36)
MCV RBC AUTO: 90.5 FL (ref 80–100)
MONOCYTES ABSOLUTE: 1 K/UL (ref 0–1.3)
MONOCYTES RELATIVE PERCENT: 11.5 %
NEUTROPHILS ABSOLUTE: 6.3 K/UL (ref 1.7–7.7)
NEUTROPHILS RELATIVE PERCENT: 73.1 %
PDW BLD-RTO: 12.5 % (ref 12.4–15.4)
PLATELET # BLD: 266 K/UL (ref 135–450)
PMV BLD AUTO: 8.9 FL (ref 5–10.5)
POTASSIUM SERPL-SCNC: 4.6 MMOL/L (ref 3.5–5.1)
RBC # BLD: 4.42 M/UL (ref 4–5.2)
SODIUM BLD-SCNC: 140 MMOL/L (ref 136–145)
TOTAL PROTEIN: 6.4 G/DL (ref 6.4–8.2)
VITAMIN B-12: 588 PG/ML (ref 211–911)
WBC # BLD: 8.7 K/UL (ref 4–11)

## 2022-07-07 PROCEDURE — 1036F TOBACCO NON-USER: CPT | Performed by: FAMILY MEDICINE

## 2022-07-07 PROCEDURE — G8428 CUR MEDS NOT DOCUMENT: HCPCS | Performed by: FAMILY MEDICINE

## 2022-07-07 PROCEDURE — 99213 OFFICE O/P EST LOW 20 MIN: CPT | Performed by: FAMILY MEDICINE

## 2022-07-07 PROCEDURE — 1123F ACP DISCUSS/DSCN MKR DOCD: CPT | Performed by: FAMILY MEDICINE

## 2022-07-07 PROCEDURE — G8399 PT W/DXA RESULTS DOCUMENT: HCPCS | Performed by: FAMILY MEDICINE

## 2022-07-07 PROCEDURE — 3017F COLORECTAL CA SCREEN DOC REV: CPT | Performed by: FAMILY MEDICINE

## 2022-07-07 PROCEDURE — 1090F PRES/ABSN URINE INCON ASSESS: CPT | Performed by: FAMILY MEDICINE

## 2022-07-07 PROCEDURE — G8417 CALC BMI ABV UP PARAM F/U: HCPCS | Performed by: FAMILY MEDICINE

## 2022-07-07 RX ORDER — DICYCLOMINE HYDROCHLORIDE 10 MG/1
10 CAPSULE ORAL
Qty: 60 CAPSULE | Refills: 1 | Status: SHIPPED | OUTPATIENT
Start: 2022-07-07 | End: 2022-07-14 | Stop reason: ALTCHOICE

## 2022-07-07 ASSESSMENT — ENCOUNTER SYMPTOMS
DIARRHEA: 1
ABDOMINAL PAIN: 1
CONSTIPATION: 1

## 2022-07-07 NOTE — PROGRESS NOTES
Cecy Rai is a 79 y.o. female    Chief Complaint   Patient presents with    Abdominal Pain     worsening from before; with gas pain and bloating     Other     sometime diarrhea; minute she statrts eating feels like her stomach gets hrad and bloats up. HPI:    Abdominal Pain  This is a new problem. The current episode started more than 1 month ago. The problem occurs daily. The problem has been gradually worsening. The pain is located in the generalized abdominal region. The quality of the pain is sharp. The abdominal pain does not radiate. Associated symptoms include constipation and diarrhea. Pertinent negatives include no fever, hematuria, melena or weight loss. The pain is aggravated by eating. The pain is relieved by bowel movements. She has tried nothing for the symptoms. There is no history of abdominal surgery. Other  Associated symptoms include abdominal pain. Pertinent negatives include no fever. ROS:    Review of Systems   Constitutional: Negative for fever and weight loss. Gastrointestinal: Positive for abdominal pain, constipation and diarrhea. Negative for melena. Genitourinary: Negative for hematuria. /72   Pulse (!) 106   Wt 179 lb (81.2 kg)   LMP  (LMP Unknown)   SpO2 98%   BMI 30.73 kg/m²     Physical Exam:    Physical Exam  Constitutional:       General: She is not in acute distress. Appearance: Normal appearance. She is obese. She is not ill-appearing or toxic-appearing. HENT:      Head: Normocephalic. Neurological:      Mental Status: She is alert. Psychiatric:         Mood and Affect: Mood normal.         Behavior: Behavior normal.         Thought Content:  Thought content normal.         Current Outpatient Medications   Medication Sig Dispense Refill    dicyclomine (BENTYL) 10 MG capsule Take 1 capsule by mouth 4 times daily (before meals and nightly) 60 capsule 1    lovastatin (MEVACOR) 40 MG tablet TAKE ONE TAB BY MOUTH ONCE NIGHTLY 90 tablet 1    losartan (COZAAR) 100 MG tablet TAKE ONE TAB BY MOUTH DAILY 90 tablet 1    fluticasone (FLONASE) 50 MCG/ACT nasal spray SPRAY ONE SPRAY IN EACH NOSTRIL ONCE DAILY 1 each 2    Multiple Vitamin (MULTIVITAMINS PO) Take by mouth      sertraline (ZOLOFT) 50 MG tablet TAKE ONE TABLET BY MOUTH DAILY (Patient taking differently: 25 mg TAKE ONE TABLET BY MOUTH DAILY) 90 tablet 1    ibuprofen (ADVIL;MOTRIN) 800 MG tablet TAKE 1 TABLET EVERY 6 HOURSAS NEEDED 180 tablet 2    aspirin 81 MG chewable tablet Take 81 mg by mouth      Coenzyme Q10 10 MG CAPS Take 10 mg by mouth      Calcium Carb-Cholecalciferol (CALCIUM CARBONATE-VITAMIN D3 PO) Take by mouth       No current facility-administered medications for this visit. Assessment:    1. Diffuse abdominal pain    2. Mixed hyperlipidemia        Plan:    1. Diffuse abdominal pain  Her symptoms are more suspicious for IBS. Unlikely gallbladder disease as no right lower quadrant pain. Increase fiber intake in the morning. Increase intake of vegetables and fruits. Try the Bentyl. Discussed the side effects. She will go for colonoscopy later this month. If no improvement, she will let us know and a CT of the abdomen will be recommended due to family history of pancreatic cancer.   - CBC with Auto Differential; Future  - Comprehensive Metabolic Panel; Future  - Lipase; Future  - dicyclomine (BENTYL) 10 MG capsule; Take 1 capsule by mouth 4 times daily (before meals and nightly)  Dispense: 60 capsule; Refill: 1    2. Mixed hyperlipidemia  - Vitamin B12 & Folate; Future  - Magnesium; Future    Patient to return to clinic if symptoms worsen or fail to improve.

## 2022-07-10 ENCOUNTER — HOSPITAL ENCOUNTER (EMERGENCY)
Age: 67
Discharge: HOME | End: 2022-07-10
Attending: EMERGENCY MEDICINE
Payer: MEDICARE

## 2022-07-10 ENCOUNTER — APPOINTMENT (OUTPATIENT)
Dept: CT IMAGING | Age: 67
End: 2022-07-10
Payer: MEDICARE

## 2022-07-10 VITALS
DIASTOLIC BLOOD PRESSURE: 74 MMHG | OXYGEN SATURATION: 95 % | SYSTOLIC BLOOD PRESSURE: 124 MMHG | BODY MASS INDEX: 30.56 KG/M2 | HEIGHT: 64 IN | RESPIRATION RATE: 16 BRPM | TEMPERATURE: 98 F | HEART RATE: 109 BPM | WEIGHT: 179 LBS

## 2022-07-10 DIAGNOSIS — C78.6 PERITONEAL CARCINOMATOSIS (HCC): ICD-10-CM

## 2022-07-10 DIAGNOSIS — N94.89 ADNEXAL MASS: Primary | ICD-10-CM

## 2022-07-10 LAB
A/G RATIO: 1.5 (ref 1.1–2.2)
ALBUMIN SERPL-MCNC: 4.4 G/DL (ref 3.4–5)
ALP BLD-CCNC: 59 U/L (ref 40–129)
ALT SERPL-CCNC: 24 U/L (ref 10–40)
ANION GAP SERPL CALCULATED.3IONS-SCNC: 15 MMOL/L (ref 3–16)
AST SERPL-CCNC: 69 U/L (ref 15–37)
BASOPHILS ABSOLUTE: 0 K/UL (ref 0–0.2)
BASOPHILS RELATIVE PERCENT: 0.5 %
BILIRUB SERPL-MCNC: 0.6 MG/DL (ref 0–1)
BILIRUBIN URINE: NEGATIVE
BLOOD, URINE: NEGATIVE
BUN BLDV-MCNC: 12 MG/DL (ref 7–20)
CALCIUM SERPL-MCNC: 9.9 MG/DL (ref 8.3–10.6)
CHLORIDE BLD-SCNC: 99 MMOL/L (ref 99–110)
CLARITY: CLEAR
CO2: 22 MMOL/L (ref 21–32)
COLOR: YELLOW
CREAT SERPL-MCNC: 0.6 MG/DL (ref 0.6–1.2)
EOSINOPHILS ABSOLUTE: 0.1 K/UL (ref 0–0.6)
EOSINOPHILS RELATIVE PERCENT: 1 %
GFR AFRICAN AMERICAN: >60
GFR NON-AFRICAN AMERICAN: >60
GLUCOSE BLD-MCNC: 96 MG/DL (ref 70–99)
GLUCOSE URINE: NEGATIVE MG/DL
HCT VFR BLD CALC: 40.6 % (ref 36–48)
HEMOGLOBIN: 13.4 G/DL (ref 12–16)
KETONES, URINE: ABNORMAL MG/DL
LACTIC ACID: 1.1 MMOL/L (ref 0.4–2)
LEUKOCYTE ESTERASE, URINE: NEGATIVE
LIPASE: 33 U/L (ref 13–60)
LYMPHOCYTES ABSOLUTE: 1.1 K/UL (ref 1–5.1)
LYMPHOCYTES RELATIVE PERCENT: 13 %
MCH RBC QN AUTO: 29.6 PG (ref 26–34)
MCHC RBC AUTO-ENTMCNC: 33.1 G/DL (ref 31–36)
MCV RBC AUTO: 89.4 FL (ref 80–100)
MICROSCOPIC EXAMINATION: ABNORMAL
MONOCYTES ABSOLUTE: 0.9 K/UL (ref 0–1.3)
MONOCYTES RELATIVE PERCENT: 10.5 %
NEUTROPHILS ABSOLUTE: 6.5 K/UL (ref 1.7–7.7)
NEUTROPHILS RELATIVE PERCENT: 75 %
NITRITE, URINE: NEGATIVE
PDW BLD-RTO: 12.5 % (ref 12.4–15.4)
PH UA: 6 (ref 5–8)
PLATELET # BLD: 306 K/UL (ref 135–450)
PMV BLD AUTO: 7.6 FL (ref 5–10.5)
POTASSIUM REFLEX MAGNESIUM: 4.4 MMOL/L (ref 3.5–5.1)
PROTEIN UA: NEGATIVE MG/DL
RBC # BLD: 4.54 M/UL (ref 4–5.2)
SODIUM BLD-SCNC: 136 MMOL/L (ref 136–145)
SPECIFIC GRAVITY UA: <=1.005 (ref 1–1.03)
TOTAL PROTEIN: 7.3 G/DL (ref 6.4–8.2)
URINE REFLEX TO CULTURE: ABNORMAL
URINE TYPE: ABNORMAL
UROBILINOGEN, URINE: 0.2 E.U./DL
WBC # BLD: 8.7 K/UL (ref 4–11)

## 2022-07-10 PROCEDURE — 85025 COMPLETE CBC W/AUTO DIFF WBC: CPT

## 2022-07-10 PROCEDURE — 96375 TX/PRO/DX INJ NEW DRUG ADDON: CPT

## 2022-07-10 PROCEDURE — 83690 ASSAY OF LIPASE: CPT

## 2022-07-10 PROCEDURE — 93005 ELECTROCARDIOGRAM TRACING: CPT | Performed by: EMERGENCY MEDICINE

## 2022-07-10 PROCEDURE — 74177 CT ABD & PELVIS W/CONTRAST: CPT

## 2022-07-10 PROCEDURE — 80053 COMPREHEN METABOLIC PANEL: CPT

## 2022-07-10 PROCEDURE — 81003 URINALYSIS AUTO W/O SCOPE: CPT

## 2022-07-10 PROCEDURE — 6360000002 HC RX W HCPCS: Performed by: EMERGENCY MEDICINE

## 2022-07-10 PROCEDURE — 83605 ASSAY OF LACTIC ACID: CPT

## 2022-07-10 PROCEDURE — 96374 THER/PROPH/DIAG INJ IV PUSH: CPT

## 2022-07-10 PROCEDURE — 6360000004 HC RX CONTRAST MEDICATION: Performed by: EMERGENCY MEDICINE

## 2022-07-10 PROCEDURE — 99285 EMERGENCY DEPT VISIT HI MDM: CPT

## 2022-07-10 RX ORDER — POLYETHYLENE GLYCOL 3350 17 G/17G
17 POWDER, FOR SOLUTION ORAL DAILY
Qty: 510 G | Refills: 0 | Status: SHIPPED | OUTPATIENT
Start: 2022-07-10 | End: 2022-08-09

## 2022-07-10 RX ORDER — MORPHINE SULFATE 15 MG/1
15 TABLET ORAL EVERY 4 HOURS PRN
Qty: 17 TABLET | Refills: 0 | Status: SHIPPED | OUTPATIENT
Start: 2022-07-10 | End: 2022-07-10 | Stop reason: SDUPTHER

## 2022-07-10 RX ORDER — ONDANSETRON 4 MG/1
4 TABLET, ORALLY DISINTEGRATING ORAL 4 TIMES DAILY PRN
Qty: 15 TABLET | Refills: 0 | Status: SHIPPED | OUTPATIENT
Start: 2022-07-10 | End: 2022-07-15

## 2022-07-10 RX ORDER — POLYETHYLENE GLYCOL 3350 17 G/17G
17 POWDER, FOR SOLUTION ORAL DAILY
Qty: 510 G | Refills: 0 | Status: SHIPPED | OUTPATIENT
Start: 2022-07-10 | End: 2022-07-10 | Stop reason: SDUPTHER

## 2022-07-10 RX ORDER — MORPHINE SULFATE 4 MG/ML
4 INJECTION, SOLUTION INTRAMUSCULAR; INTRAVENOUS
Status: DISCONTINUED | OUTPATIENT
Start: 2022-07-10 | End: 2022-07-10 | Stop reason: HOSPADM

## 2022-07-10 RX ORDER — ONDANSETRON 2 MG/ML
4 INJECTION INTRAMUSCULAR; INTRAVENOUS
Status: DISCONTINUED | OUTPATIENT
Start: 2022-07-10 | End: 2022-07-10 | Stop reason: HOSPADM

## 2022-07-10 RX ORDER — ONDANSETRON 4 MG/1
4 TABLET, ORALLY DISINTEGRATING ORAL 4 TIMES DAILY PRN
Qty: 15 TABLET | Refills: 0 | Status: SHIPPED | OUTPATIENT
Start: 2022-07-10 | End: 2022-07-10 | Stop reason: SDUPTHER

## 2022-07-10 RX ORDER — MORPHINE SULFATE 15 MG/1
15 TABLET ORAL EVERY 4 HOURS PRN
Qty: 17 TABLET | Refills: 0 | Status: SHIPPED | OUTPATIENT
Start: 2022-07-10 | End: 2022-07-14 | Stop reason: SDUPTHER

## 2022-07-10 RX ADMIN — ONDANSETRON 4 MG: 2 INJECTION INTRAMUSCULAR; INTRAVENOUS at 16:43

## 2022-07-10 RX ADMIN — IOPAMIDOL 75 ML: 755 INJECTION, SOLUTION INTRAVENOUS at 17:38

## 2022-07-10 RX ADMIN — MORPHINE SULFATE 4 MG: 4 INJECTION, SOLUTION INTRAMUSCULAR; INTRAVENOUS at 16:44

## 2022-07-10 ASSESSMENT — PAIN SCALES - GENERAL
PAINLEVEL_OUTOF10: 7
PAINLEVEL_OUTOF10: 5
PAINLEVEL_OUTOF10: 7

## 2022-07-10 ASSESSMENT — PAIN - FUNCTIONAL ASSESSMENT: PAIN_FUNCTIONAL_ASSESSMENT: 0-10

## 2022-07-10 ASSESSMENT — PAIN DESCRIPTION - ORIENTATION: ORIENTATION: MID

## 2022-07-10 ASSESSMENT — PAIN DESCRIPTION - LOCATION: LOCATION: ABDOMEN

## 2022-07-10 NOTE — ED NOTES
Pt scripts x3 instructed to follow up with [facility].  Assessed per Dr Fanny Caldera, LPN  38/70/56 150

## 2022-07-10 NOTE — ED PROVIDER NOTES
Emergency Physician Note        Note Open Time: 5:37 PM EDT    Chief Complaint  Abdominal Pain (abdominal pain for 1.5 months bloating increasing recently. )       History of Present Illness  Emma Olvera is a 79 y.o. female who presents to the ED for abdominal pain. Patient reports about 6 weeks of abdominal bloating in the last week she is felt very constipated as well. She denies any fevers, sweats or chills. No vomiting. No urinary or vaginal symptoms. 10 systems reviewed, pertinent positives per HPI otherwise noted to be negative    I have reviewed the following from the nursing documentation:      Prior to Admission medications    Medication Sig Start Date End Date Taking?  Authorizing Provider   dicyclomine (BENTYL) 10 MG capsule Take 1 capsule by mouth 4 times daily (before meals and nightly) 7/7/22   José Miguel Harmon DO   lovastatin (MEVACOR) 40 MG tablet TAKE ONE TAB BY MOUTH ONCE NIGHTLY 6/6/22   JENI Restrepo   losartan (COZAAR) 100 MG tablet TAKE ONE TAB BY MOUTH DAILY 6/6/22   JENI Restrepo   fluticasone (FLONASE) 50 MCG/ACT nasal spray SPRAY ONE SPRAY IN EACH NOSTRIL ONCE DAILY 4/11/22   JENI Restrepo   Multiple Vitamin (MULTIVITAMINS PO) Take by mouth    Historical Provider, MD   sertraline (ZOLOFT) 50 MG tablet TAKE ONE TABLET BY MOUTH DAILY  Patient taking differently: 25 mg TAKE ONE TABLET BY MOUTH DAILY 1/20/22   Davidson Londono MD   ibuprofen (ADVIL;MOTRIN) 800 MG tablet TAKE 1 TABLET EVERY 6 HOURSAS NEEDED 5/10/21   Davidson Londono MD   aspirin 81 MG chewable tablet Take 81 mg by mouth    Historical Provider, MD   Coenzyme Q10 10 MG CAPS Take 10 mg by mouth    Historical Provider, MD   Calcium Carb-Cholecalciferol (CALCIUM CARBONATE-VITAMIN D3 PO) Take by mouth    Historical Provider, MD       Allergies as of 07/10/2022    (No Known Allergies)       Past Medical History:   Diagnosis Date    Anxiety     Hypertension         Surgical History:   Past Surgical History:   Procedure Laterality Date    COLONOSCOPY  14    colon polyp removed, diverticulosis        Family History:    Family History   Problem Relation Age of Onset    Cancer Mother         Pancreatic Cancer    Diabetes Mother     High Blood Pressure Mother     Breast Cancer Maternal Aunt     Breast Cancer Maternal Cousin        Social History     Socioeconomic History    Marital status: Single     Spouse name: Not on file    Number of children: Not on file    Years of education: 15    Highest education level: Not on file   Occupational History    Occupation: retired   Tobacco Use    Smoking status: Former Smoker     Packs/day: 0.50     Years: 20.00     Pack years: 10.00     Types: Cigarettes     Quit date: 5/3/1996     Years since quittin.2    Smokeless tobacco: Never Used   Substance and Sexual Activity    Alcohol use: Yes     Alcohol/week: 2.0 standard drinks     Types: 2 Cans of beer per week    Drug use: No    Sexual activity: Not Currently     Partners: Male   Other Topics Concern    Not on file   Social History Narrative    Not on file     Social Determinants of Health     Financial Resource Strain: Low Risk     Difficulty of Paying Living Expenses: Not hard at all   Food Insecurity: No Food Insecurity    Worried About [address] in the Last Year: Never true    [facility] in the Last Year: Never true   Transportation Needs: No Transportation Needs    Lack of Transportation (Medical): No    Lack of Transportation (Non-Medical):  No   Physical Activity: Inactive    Days of Exercise per Week: 0 days    Minutes of Exercise per Session: 0 min   Stress:     Feeling of Stress : Not on file   Social Connections:     Frequency of Communication with Friends and Family: Not on file    Frequency of Social Gatherings with Friends and Family: Not on file    Attends Yazidism Services: Not on file    Active Member of Clubs or Organizations: Not on file    Attends [employer] ALL OTHER NON-PLAIN FILM IMAGES SUCH AS CT, ULTRASOUND AND MRI HAVE BEEN READ BY THE RADIOLOGIST. CT ABDOMEN PELVIS W IV CONTRAST Additional Contrast? None   Final Result   Moderate ascites throughout the abdomen and pelvis and extensive soft tissue   stranding and nodular masses throughout the mesentery extending into the   omentum in keeping with omental caking which is most likely due to metastatic   disease. Recommend PET-CT correlation. Ill-defined heterogeneous soft tissue mass in the left lower pelvis measuring   10 x 9 cm which could represent an ovarian mass and a 6 cm mass in the left   lower pelvis which could represent metastatic disease or adenopathy. Recommend gyn consultation and suggest ultrasound correlation. Moderate diverticulosis of left colon with no pericolonic inflammation and no   bowel obstruction. Mild periaortic adenopathy which is probably due to metastatic disease. Small bibasilar pleural effusions and associated mild bibasilar atelectasis   or infiltrates. Multiple enlarged right paracardiac lymph nodes which is probably due to   metastatic disease      Mild hepatomegaly and chronic liver disease with fatty replacement throughout   and mild splenomegaly. Small right renal cyst with no hydronephrosis or urinary obstruction. LABS  Labs Reviewed   COMPREHENSIVE METABOLIC PANEL W/ REFLEX TO MG FOR LOW K - Abnormal; Notable for the following components:       Result Value    AST 69 (*)     All other components within normal limits   URINALYSIS WITH REFLEX TO CULTURE - Abnormal; Notable for the following components:    Ketones, Urine TRACE (*)     All other components within normal limits   CBC WITH AUTO DIFFERENTIAL   LIPASE   LACTIC ACID       MEDICAL DECISION MAKING        I believe patient's tachycardia is due to pain but she declined any pain medication initially.   I spoke with Dr. Soo Sorensen, oncologist, and he recommended patient follow-up in the office this week. He will have the Gyn-Onc follow with the patient. I advised the patient to return to the emergency department immediately for any new or worsening symptoms, such as vomiting or increasing pain or any fevers. The patient voiced agreement and understanding of the treatment plan. I estimate there is LOW risk for ACUTE APPENDICITIS, BOWEL OBSTRUCTION, CHOLECYSTITIS, DIVERTICULITIS, INCARCERATED HERNIA, PANCREATITIS, or PERFORATED BOWEL or ULCER, thus I consider the discharge disposition reasonable. Also, there is no evidence or peritonitis, sepsis, or toxicity. Miko Sorto and I have discussed the diagnosis and risks, and we agree with discharging home to follow-up with their primary doctor. We also discussed returning to the Emergency Department immediately if new or worsening symptoms occur. We have discussed the symptoms which are most concerning (e.g., bloody stool, fever, changing or worsening pain, vomiting) that necessitate immediate return. FINAL Impression    1. Adnexal mass    2. Peritoneal carcinomatosis (HCC)        Blood pressure 124/74, pulse (!) 109, temperature 98 °F (36.7 °C), temperature source Oral, resp. rate 16, height 5' 4\" (1.626 m), weight 179 lb (81.2 kg), SpO2 95 %, not currently breastfeeding. Patient was given scripts for the following medications. I counseled patient how to take these medications. Discharge Medication List as of 7/10/2022  6:58 PM      MS IR, Zofran, MiraLAX prescriptions given. Disposition  Pt is in good condition upon Discharge to home. This chart was generated using the [employer] dictation system. I created this record but it may contain dictation errors.          Himanshu Garcia MD  07/10/22 2056       Himanshu Garcia MD  07/10/22 2056

## 2022-07-10 NOTE — ED NOTES
7608- PS Hem/ONC  Per   RE likely metastatic ovarian ca  1839-  Returned page        Bryan Singletary  07/10/22 1844

## 2022-07-12 LAB
EKG ATRIAL RATE: 112 BPM
EKG DIAGNOSIS: NORMAL
EKG P AXIS: 7 DEGREES
EKG P-R INTERVAL: 154 MS
EKG Q-T INTERVAL: 292 MS
EKG QRS DURATION: 68 MS
EKG QTC CALCULATION (BAZETT): 398 MS
EKG R AXIS: -16 DEGREES
EKG T AXIS: 2 DEGREES
EKG VENTRICULAR RATE: 112 BPM

## 2022-07-12 PROCEDURE — 93010 ELECTROCARDIOGRAM REPORT: CPT | Performed by: INTERNAL MEDICINE

## 2022-07-13 ENCOUNTER — TELEPHONE (OUTPATIENT)
Dept: FAMILY MEDICINE CLINIC | Age: 67
End: 2022-07-13

## 2022-07-13 NOTE — TELEPHONE ENCOUNTER
Left message for patient to call back.    Please help schedule for tomorrow at 11:30 am per Dr. Rasta Marina

## 2022-07-13 NOTE — TELEPHONE ENCOUNTER
Patient was in the ER and was wanting to schedule an ed fu with  but there is nothing available are you able to squeeze the patient in? She was told to fu with oncology which isnt until next Wednesday 7/20. She was given pain meds and only has 5 left. She was told to take them every 4 hours but hasnt been taking them that often bc she was afraid of running out. She is wanting a refill on this. She went to the ER for stomach pain and found out she had ovarian cancer.

## 2022-07-14 ENCOUNTER — OFFICE VISIT (OUTPATIENT)
Dept: FAMILY MEDICINE CLINIC | Age: 67
End: 2022-07-14
Payer: MEDICARE

## 2022-07-14 VITALS
DIASTOLIC BLOOD PRESSURE: 70 MMHG | SYSTOLIC BLOOD PRESSURE: 104 MMHG | HEART RATE: 95 BPM | WEIGHT: 180.8 LBS | BODY MASS INDEX: 31.03 KG/M2 | OXYGEN SATURATION: 96 %

## 2022-07-14 DIAGNOSIS — C78.6 PERITONEAL CARCINOMATOSIS (HCC): ICD-10-CM

## 2022-07-14 DIAGNOSIS — R63.0 ANOREXIA: ICD-10-CM

## 2022-07-14 DIAGNOSIS — N94.89 ADNEXAL MASS: ICD-10-CM

## 2022-07-14 DIAGNOSIS — R10.84 GENERALIZED ABDOMINAL PAIN: Primary | ICD-10-CM

## 2022-07-14 DIAGNOSIS — F32.9 REACTIVE DEPRESSION: ICD-10-CM

## 2022-07-14 DIAGNOSIS — I10 PRIMARY HYPERTENSION: ICD-10-CM

## 2022-07-14 DIAGNOSIS — R18.0 MALIGNANT ASCITES: ICD-10-CM

## 2022-07-14 PROCEDURE — G8399 PT W/DXA RESULTS DOCUMENT: HCPCS | Performed by: FAMILY MEDICINE

## 2022-07-14 PROCEDURE — G8427 DOCREV CUR MEDS BY ELIG CLIN: HCPCS | Performed by: FAMILY MEDICINE

## 2022-07-14 PROCEDURE — 1123F ACP DISCUSS/DSCN MKR DOCD: CPT | Performed by: FAMILY MEDICINE

## 2022-07-14 PROCEDURE — 3017F COLORECTAL CA SCREEN DOC REV: CPT | Performed by: FAMILY MEDICINE

## 2022-07-14 PROCEDURE — G8417 CALC BMI ABV UP PARAM F/U: HCPCS | Performed by: FAMILY MEDICINE

## 2022-07-14 PROCEDURE — 1036F TOBACCO NON-USER: CPT | Performed by: FAMILY MEDICINE

## 2022-07-14 PROCEDURE — 1090F PRES/ABSN URINE INCON ASSESS: CPT | Performed by: FAMILY MEDICINE

## 2022-07-14 PROCEDURE — 99215 OFFICE O/P EST HI 40 MIN: CPT | Performed by: FAMILY MEDICINE

## 2022-07-14 RX ORDER — SENNA AND DOCUSATE SODIUM 50; 8.6 MG/1; MG/1
2 TABLET, FILM COATED ORAL DAILY
Qty: 60 TABLET | Refills: 2 | Status: SHIPPED | OUTPATIENT
Start: 2022-07-14

## 2022-07-14 RX ORDER — MORPHINE SULFATE 15 MG/1
15 TABLET ORAL EVERY 4 HOURS PRN
Qty: 40 TABLET | Refills: 0 | Status: ON HOLD | OUTPATIENT
Start: 2022-07-14 | End: 2022-07-29 | Stop reason: SDUPTHER

## 2022-07-14 RX ORDER — OMEPRAZOLE 20 MG/1
20 CAPSULE, DELAYED RELEASE ORAL
Qty: 30 CAPSULE | Refills: 5 | Status: SHIPPED | OUTPATIENT
Start: 2022-07-14

## 2022-07-14 ASSESSMENT — ENCOUNTER SYMPTOMS
CONSTIPATION: 1
BACK PAIN: 0
CHEST TIGHTNESS: 0
VOMITING: 0
NAUSEA: 1
COLOR CHANGE: 0
ABDOMINAL PAIN: 1
ABDOMINAL DISTENTION: 1
SHORTNESS OF BREATH: 0

## 2022-07-14 ASSESSMENT — PATIENT HEALTH QUESTIONNAIRE - PHQ9
1. LITTLE INTEREST OR PLEASURE IN DOING THINGS: 2
SUM OF ALL RESPONSES TO PHQ QUESTIONS 1-9: 13
4. FEELING TIRED OR HAVING LITTLE ENERGY: 3
7. TROUBLE CONCENTRATING ON THINGS, SUCH AS READING THE NEWSPAPER OR WATCHING TELEVISION: 1
3. TROUBLE FALLING OR STAYING ASLEEP: 1
SUM OF ALL RESPONSES TO PHQ QUESTIONS 1-9: 13
5. POOR APPETITE OR OVEREATING: 3
SUM OF ALL RESPONSES TO PHQ QUESTIONS 1-9: 13
6. FEELING BAD ABOUT YOURSELF - OR THAT YOU ARE A FAILURE OR HAVE LET YOURSELF OR YOUR FAMILY DOWN: 1
8. MOVING OR SPEAKING SO SLOWLY THAT OTHER PEOPLE COULD HAVE NOTICED. OR THE OPPOSITE, BEING SO FIGETY OR RESTLESS THAT YOU HAVE BEEN MOVING AROUND A LOT MORE THAN USUAL: 0
9. THOUGHTS THAT YOU WOULD BE BETTER OFF DEAD, OR OF HURTING YOURSELF: 0
10. IF YOU CHECKED OFF ANY PROBLEMS, HOW DIFFICULT HAVE THESE PROBLEMS MADE IT FOR YOU TO DO YOUR WORK, TAKE CARE OF THINGS AT HOME, OR GET ALONG WITH OTHER PEOPLE: 1
2. FEELING DOWN, DEPRESSED OR HOPELESS: 2
SUM OF ALL RESPONSES TO PHQ QUESTIONS 1-9: 13
SUM OF ALL RESPONSES TO PHQ9 QUESTIONS 1 & 2: 4

## 2022-07-14 ASSESSMENT — ANXIETY QUESTIONNAIRES
6. BECOMING EASILY ANNOYED OR IRRITABLE: 0
GAD7 TOTAL SCORE: 3
IF YOU CHECKED OFF ANY PROBLEMS ON THIS QUESTIONNAIRE, HOW DIFFICULT HAVE THESE PROBLEMS MADE IT FOR YOU TO DO YOUR WORK, TAKE CARE OF THINGS AT HOME, OR GET ALONG WITH OTHER PEOPLE: NOT DIFFICULT AT ALL
5. BEING SO RESTLESS THAT IT IS HARD TO SIT STILL: 0
7. FEELING AFRAID AS IF SOMETHING AWFUL MIGHT HAPPEN: 1
2. NOT BEING ABLE TO STOP OR CONTROL WORRYING: 0
1. FEELING NERVOUS, ANXIOUS, OR ON EDGE: 1
3. WORRYING TOO MUCH ABOUT DIFFERENT THINGS: 0
4. TROUBLE RELAXING: 1

## 2022-07-14 NOTE — PROGRESS NOTES
2022    This is a 79 y.o. female who presents for  Chief Complaint   Patient presents with   [employer] ED Follow-up     new ovarian cancer diagnosis       HPI:     Was seen in the ER on 7/10 for abd pain  Ct showed:  10x9 cm L pelvic mass (likely ovarian) with 6 cm mass (met vs LAD)  Moderate ascites  Periaortic adenopathy, R paracardiac LAD, nodular masses throughout the mesentery     + diffuse mod-sev abd pain  Morphine given by the ER is helping  Taking 3--4 tabs daily, starting in the afternoon, helping overall  + BMs daily, last week was diarrhea, this week they are soft but smaller amounts  + anorexia, eating half of her normal amounts, + mild nausea. Protein drinks are easier to drink  Denies any urinary complaints    Has an appmnt with Dr. Hoa Jiang next week, nervous about waiting so long for this. Sister just underwent double mastectomy for breast Ca, genetic testing reportedly neg      Past Medical History:   Diagnosis Date    Anxiety     Hypertension        Past Surgical History:   Procedure Laterality Date    COLONOSCOPY  14    colon polyp removed, diverticulosis       Social History     Socioeconomic History    Marital status: Single     Spouse name: Not on file    Number of children: Not on file    Years of education: 15    Highest education level: Not on file   Occupational History    Occupation: retired   Tobacco Use    Smoking status: Former Smoker     Packs/day: 0.50     Years: 20.00     Pack years: 10.00     Types: Cigarettes     Quit date: 5/3/1996     Years since quittin.2    Smokeless tobacco: Never Used   Substance and Sexual Activity    Alcohol use:  Yes     Alcohol/week: 2.0 standard drinks     Types: 2 Cans of beer per week    Drug use: No    Sexual activity: Not Currently     Partners: Male   Other Topics Concern    Not on file   Social History Narrative    Not on file     Social Determinants of Health     Financial Resource Strain: Low Risk     Difficulty of Paying Living Expenses: Not hard at all   Food Insecurity: No Food Insecurity    Worried About [address] [place] [address] in the Last Year: Never true    Ran Out of Food in the Last Year: Never true   Transportation Needs: No Transportation Needs    Lack of Transportation (Medical): No    Lack of Transportation (Non-Medical): No   Physical Activity: Inactive    Days of Exercise per Week: 0 days    Minutes of Exercise per Session: 0 min   Stress:     Feeling of Stress : Not on file   Social Connections:     Frequency of Communication with Friends and Family: Not on file    Frequency of Social Gatherings with Friends and Family: Not on file    Attends Yazidism Services: Not on file    Active Member of [address] [place] [address] or Organizations: Not on file    Attends Club or Organization Meetings: Not on file    Marital Status: Not on file   Intimate Partner Violence:     Fear of Current or Ex-Partner: Not on file    Emotionally Abused: Not on file    Physically Abused: Not on file    Sexually Abused: Not on file   Housing Stability: [address] Unable to Pay for Housing in the Last Year: No    Number of Places Lived in the Last Year: 1    Unstable Housing in the Last Year: No       Family History   Problem Relation Age of Onset    Cancer Mother         Pancreatic Cancer    Diabetes Mother     High Blood Pressure Mother     Breast Cancer Maternal Aunt     Breast Cancer Maternal Cousin        Current Outpatient Medications   Medication Sig Dispense Refill    morphine (MSIR) 15 MG tablet Take 1 tablet by mouth every 4 hours as needed for Pain for up to 30 days.  40 tablet 0    omeprazole (PRILOSEC) 20 MG delayed release capsule Take 1 capsule by mouth every morning (before breakfast) 30 capsule 5    sennosides-docusate sodium (SENOKOT-S) 8.6-50 MG tablet Take 2 tablets by mouth daily 60 tablet 2    ondansetron (ZOFRAN ODT) 4 MG disintegrating tablet Take 1 tablet by mouth 4 times daily as needed for Nausea or Vomiting 15 week  Start Senokot with this    - morphine (MSIR) 15 MG tablet; Take 1 tablet by mouth every 4 hours as needed for Pain for up to 30 days. Dispense: 40 tablet; Refill: 0  - sennosides-docusate sodium (SENOKOT-S) 8.6-50 MG tablet; Take 2 tablets by mouth daily  Dispense: 60 tablet; Refill: 2    2. Adnexal mass  - morphine (MSIR) 15 MG tablet; Take 1 tablet by mouth every 4 hours as needed for Pain for up to 30 days. Dispense: 40 tablet; Refill: 0  3. Peritoneal carcinomatosis (HCC)  - morphine (MSIR) 15 MG tablet; Take 1 tablet by mouth every 4 hours as needed for Pain for up to 30 days. Dispense: 40 tablet; Refill: 0    Plan per Dr. Vasquez Navarro next week, discussed the likelihood of PET scan. 4. Malignant ascites  Was supposed to see Dr. Reilly Vásquez later this month for a Colonoscopy. I called and left a message with their office to speak to her directly regarding new Dx, plan, and CT results showing moderate ascites. Would request a consideration for therapeutic paracentesis given her progressively worsening abd pain. 5. Anorexia  Encouraged small, frequent meals and an increase in liquid protein drinks given she is able to tolerate these. Will start PPI. Monitor closely   - omeprazole (PRILOSEC) 20 MG delayed release capsule; Take 1 capsule by mouth every morning (before breakfast)  Dispense: 30 capsule; Refill: 5    6. Reactive depression  Very tearful today given her recent CT findings. Expected course of visits discussed briefly. Discussed seeing Dr. Brodie Barr, [facility], for support. She recently increased her Zoloft herself to 50 mg, c/w for now. Will f/u in 1 mo. 7. Primary hypertension  Stable on Losartan 100 mg        While assessing care for this patient, I have reviewed all pertinent lab work/imaging/ specialist notes and care in reference to those problems addressed above in detail. Appropriate medical decision making was based on this.      Please note that portions of this note may have been completed with a voice recognition program. Efforts were made to edit the dictations but occasionally words are mis-transcribed. Return in about 4 weeks (around 8/11/2022) for 30 minute visit.

## 2022-07-19 ENCOUNTER — HOSPITAL ENCOUNTER (INPATIENT)
Age: 67
LOS: 10 days | Discharge: HOME HEALTH | DRG: 754 | End: 2022-07-29
Attending: EMERGENCY MEDICINE | Admitting: INTERNAL MEDICINE
Payer: MEDICARE

## 2022-07-19 ENCOUNTER — APPOINTMENT (OUTPATIENT)
Dept: GENERAL RADIOLOGY | Age: 67
DRG: 754 | End: 2022-07-19
Payer: MEDICARE

## 2022-07-19 ENCOUNTER — APPOINTMENT (OUTPATIENT)
Dept: VASCULAR LAB | Age: 67
DRG: 754 | End: 2022-07-19
Payer: MEDICARE

## 2022-07-19 ENCOUNTER — APPOINTMENT (OUTPATIENT)
Dept: CT IMAGING | Age: 67
DRG: 754 | End: 2022-07-19
Payer: MEDICARE

## 2022-07-19 DIAGNOSIS — R77.8 ELEVATED TROPONIN: ICD-10-CM

## 2022-07-19 DIAGNOSIS — R10.84 GENERALIZED ABDOMINAL PAIN: ICD-10-CM

## 2022-07-19 DIAGNOSIS — N94.89 ADNEXAL MASS: ICD-10-CM

## 2022-07-19 DIAGNOSIS — C78.6 PERITONEAL CARCINOMATOSIS (HCC): ICD-10-CM

## 2022-07-19 DIAGNOSIS — F41.9 ANXIETY: ICD-10-CM

## 2022-07-19 DIAGNOSIS — I26.99 ACUTE PULMONARY EMBOLISM, UNSPECIFIED PULMONARY EMBOLISM TYPE, UNSPECIFIED WHETHER ACUTE COR PULMONALE PRESENT (HCC): Primary | ICD-10-CM

## 2022-07-19 LAB
A/G RATIO: 1.3 (ref 1.1–2.2)
ALBUMIN SERPL-MCNC: 3.7 G/DL (ref 3.4–5)
ALP BLD-CCNC: 58 U/L (ref 40–129)
ALT SERPL-CCNC: 27 U/L (ref 10–40)
AMORPHOUS: ABNORMAL /HPF
ANION GAP SERPL CALCULATED.3IONS-SCNC: 15 MMOL/L (ref 3–16)
ANTI-XA UNFRAC HEPARIN: 0.22 IU/ML (ref 0.3–0.7)
ANTI-XA UNFRAC HEPARIN: 0.32 IU/ML (ref 0.3–0.7)
APTT: 33.8 SEC (ref 23–34.3)
AST SERPL-CCNC: 75 U/L (ref 15–37)
BACTERIA: ABNORMAL /HPF
BASOPHILS ABSOLUTE: 0.1 K/UL (ref 0–0.2)
BASOPHILS RELATIVE PERCENT: 0.3 %
BILIRUB SERPL-MCNC: 0.5 MG/DL (ref 0–1)
BILIRUBIN URINE: NEGATIVE
BLOOD, URINE: NEGATIVE
BUN BLDV-MCNC: 29 MG/DL (ref 7–20)
CALCIUM SERPL-MCNC: 9.9 MG/DL (ref 8.3–10.6)
CHLORIDE BLD-SCNC: 91 MMOL/L (ref 99–110)
CLARITY: CLEAR
CO2: 21 MMOL/L (ref 21–32)
COLOR: YELLOW
CREAT SERPL-MCNC: 1.8 MG/DL (ref 0.6–1.2)
CREATININE URINE: 159.8 MG/DL (ref 28–259)
EKG ATRIAL RATE: 111 BPM
EKG DIAGNOSIS: NORMAL
EKG P AXIS: 7 DEGREES
EKG P-R INTERVAL: 148 MS
EKG Q-T INTERVAL: 284 MS
EKG QRS DURATION: 80 MS
EKG QTC CALCULATION (BAZETT): 386 MS
EKG R AXIS: 18 DEGREES
EKG T AXIS: 22 DEGREES
EKG VENTRICULAR RATE: 111 BPM
EOSINOPHILS ABSOLUTE: 0 K/UL (ref 0–0.6)
EOSINOPHILS RELATIVE PERCENT: 0.1 %
EPITHELIAL CELLS, UA: ABNORMAL /HPF (ref 0–5)
GFR AFRICAN AMERICAN: 34
GFR NON-AFRICAN AMERICAN: 28
GLUCOSE BLD-MCNC: 224 MG/DL (ref 70–99)
GLUCOSE URINE: NEGATIVE MG/DL
HCT VFR BLD CALC: 35.3 % (ref 36–48)
HEMATOLOGY PATH CONSULT: NORMAL
HEMOGLOBIN: 11.4 G/DL (ref 12–16)
INR BLD: 1.26 (ref 0.87–1.14)
KETONES, URINE: NEGATIVE MG/DL
LACTIC ACID: 3.7 MMOL/L (ref 0.4–2)
LEUKOCYTE ESTERASE, URINE: NEGATIVE
LYMPHOCYTES ABSOLUTE: 1.2 K/UL (ref 1–5.1)
LYMPHOCYTES RELATIVE PERCENT: 7 %
MCH RBC QN AUTO: 28.7 PG (ref 26–34)
MCHC RBC AUTO-ENTMCNC: 32.4 G/DL (ref 31–36)
MCV RBC AUTO: 88.6 FL (ref 80–100)
MICROSCOPIC EXAMINATION: YES
MONOCYTES ABSOLUTE: 1.9 K/UL (ref 0–1.3)
MONOCYTES RELATIVE PERCENT: 11.2 %
NEUTROPHILS ABSOLUTE: 14.1 K/UL (ref 1.7–7.7)
NEUTROPHILS RELATIVE PERCENT: 81.4 %
NITRITE, URINE: NEGATIVE
PDW BLD-RTO: 12.9 % (ref 12.4–15.4)
PH UA: 6 (ref 5–8)
PLATELET # BLD: 358 K/UL (ref 135–450)
PMV BLD AUTO: 7.7 FL (ref 5–10.5)
POTASSIUM REFLEX MAGNESIUM: 5.1 MMOL/L (ref 3.5–5.1)
PRO-BNP: 2799 PG/ML (ref 0–124)
PROCALCITONIN: 0.35 NG/ML (ref 0–0.15)
PROTEIN UA: ABNORMAL MG/DL
PROTHROMBIN TIME: 15.6 SEC (ref 11.7–14.5)
RBC # BLD: 3.98 M/UL (ref 4–5.2)
RBC UA: ABNORMAL /HPF (ref 0–4)
SODIUM BLD-SCNC: 127 MMOL/L (ref 136–145)
SODIUM URINE: <20 MMOL/L
SPECIFIC GRAVITY UA: 1.01 (ref 1–1.03)
TOTAL PROTEIN: 6.6 G/DL (ref 6.4–8.2)
TROPONIN: 0.27 NG/ML
TROPONIN: 0.29 NG/ML
TROPONIN: 0.54 NG/ML
URIC ACID, SERUM: 7.2 MG/DL (ref 2.6–6)
URINE REFLEX TO CULTURE: ABNORMAL
URINE TYPE: ABNORMAL
UROBILINOGEN, URINE: 0.2 E.U./DL
WBC # BLD: 17.3 K/UL (ref 4–11)
WBC UA: ABNORMAL /HPF (ref 0–5)

## 2022-07-19 PROCEDURE — 99222 1ST HOSP IP/OBS MODERATE 55: CPT | Performed by: INTERNAL MEDICINE

## 2022-07-19 PROCEDURE — 84484 ASSAY OF TROPONIN QUANT: CPT

## 2022-07-19 PROCEDURE — 93970 EXTREMITY STUDY: CPT

## 2022-07-19 PROCEDURE — 85025 COMPLETE CBC W/AUTO DIFF WBC: CPT

## 2022-07-19 PROCEDURE — 83605 ASSAY OF LACTIC ACID: CPT

## 2022-07-19 PROCEDURE — 82570 ASSAY OF URINE CREATININE: CPT

## 2022-07-19 PROCEDURE — 84145 PROCALCITONIN (PCT): CPT

## 2022-07-19 PROCEDURE — 6360000002 HC RX W HCPCS: Performed by: EMERGENCY MEDICINE

## 2022-07-19 PROCEDURE — 93010 ELECTROCARDIOGRAM REPORT: CPT | Performed by: INTERNAL MEDICINE

## 2022-07-19 PROCEDURE — 85520 HEPARIN ASSAY: CPT

## 2022-07-19 PROCEDURE — 71260 CT THORAX DX C+: CPT | Performed by: EMERGENCY MEDICINE

## 2022-07-19 PROCEDURE — 85610 PROTHROMBIN TIME: CPT

## 2022-07-19 PROCEDURE — 96375 TX/PRO/DX INJ NEW DRUG ADDON: CPT

## 2022-07-19 PROCEDURE — 85730 THROMBOPLASTIN TIME PARTIAL: CPT

## 2022-07-19 PROCEDURE — 94761 N-INVAS EAR/PLS OXIMETRY MLT: CPT

## 2022-07-19 PROCEDURE — 93005 ELECTROCARDIOGRAM TRACING: CPT | Performed by: EMERGENCY MEDICINE

## 2022-07-19 PROCEDURE — 6360000004 HC RX CONTRAST MEDICATION: Performed by: EMERGENCY MEDICINE

## 2022-07-19 PROCEDURE — 80053 COMPREHEN METABOLIC PANEL: CPT

## 2022-07-19 PROCEDURE — 6370000000 HC RX 637 (ALT 250 FOR IP): Performed by: HOSPITALIST

## 2022-07-19 PROCEDURE — 6370000000 HC RX 637 (ALT 250 FOR IP): Performed by: INTERNAL MEDICINE

## 2022-07-19 PROCEDURE — 2700000000 HC OXYGEN THERAPY PER DAY

## 2022-07-19 PROCEDURE — 2580000003 HC RX 258: Performed by: EMERGENCY MEDICINE

## 2022-07-19 PROCEDURE — 36415 COLL VENOUS BLD VENIPUNCTURE: CPT

## 2022-07-19 PROCEDURE — 74177 CT ABD & PELVIS W/CONTRAST: CPT

## 2022-07-19 PROCEDURE — 6360000002 HC RX W HCPCS: Performed by: INTERNAL MEDICINE

## 2022-07-19 PROCEDURE — 84550 ASSAY OF BLOOD/URIC ACID: CPT

## 2022-07-19 PROCEDURE — 81001 URINALYSIS AUTO W/SCOPE: CPT

## 2022-07-19 PROCEDURE — 84300 ASSAY OF URINE SODIUM: CPT

## 2022-07-19 PROCEDURE — 83880 ASSAY OF NATRIURETIC PEPTIDE: CPT

## 2022-07-19 PROCEDURE — 2580000003 HC RX 258: Performed by: INTERNAL MEDICINE

## 2022-07-19 PROCEDURE — 99285 EMERGENCY DEPT VISIT HI MDM: CPT

## 2022-07-19 PROCEDURE — 2060000000 HC ICU INTERMEDIATE R&B

## 2022-07-19 PROCEDURE — 71045 X-RAY EXAM CHEST 1 VIEW: CPT

## 2022-07-19 PROCEDURE — 96374 THER/PROPH/DIAG INJ IV PUSH: CPT

## 2022-07-19 RX ORDER — SENNA PLUS 8.6 MG/1
1 TABLET ORAL 2 TIMES DAILY PRN
Status: DISCONTINUED | OUTPATIENT
Start: 2022-07-19 | End: 2022-07-29 | Stop reason: HOSPADM

## 2022-07-19 RX ORDER — MORPHINE SULFATE 15 MG/1
15 TABLET ORAL EVERY 4 HOURS PRN
Status: DISCONTINUED | OUTPATIENT
Start: 2022-07-19 | End: 2022-07-29 | Stop reason: HOSPADM

## 2022-07-19 RX ORDER — SENNA AND DOCUSATE SODIUM 50; 8.6 MG/1; MG/1
2 TABLET, FILM COATED ORAL DAILY
Status: DISCONTINUED | OUTPATIENT
Start: 2022-07-19 | End: 2022-07-29 | Stop reason: HOSPADM

## 2022-07-19 RX ORDER — SODIUM CHLORIDE 0.9 % (FLUSH) 0.9 %
5-40 SYRINGE (ML) INJECTION PRN
Status: DISCONTINUED | OUTPATIENT
Start: 2022-07-19 | End: 2022-07-25

## 2022-07-19 RX ORDER — 0.9 % SODIUM CHLORIDE 0.9 %
30 INTRAVENOUS SOLUTION INTRAVENOUS ONCE
Status: COMPLETED | OUTPATIENT
Start: 2022-07-19 | End: 2022-07-19

## 2022-07-19 RX ORDER — ACETAMINOPHEN 325 MG/1
650 TABLET ORAL EVERY 6 HOURS PRN
Status: DISCONTINUED | OUTPATIENT
Start: 2022-07-19 | End: 2022-07-29 | Stop reason: HOSPADM

## 2022-07-19 RX ORDER — HEPARIN SODIUM 1000 [USP'U]/ML
2600 INJECTION, SOLUTION INTRAVENOUS; SUBCUTANEOUS PRN
Status: DISCONTINUED | OUTPATIENT
Start: 2022-07-19 | End: 2022-07-29 | Stop reason: HOSPADM

## 2022-07-19 RX ORDER — SODIUM CHLORIDE 9 MG/ML
INJECTION, SOLUTION INTRAVENOUS PRN
Status: DISCONTINUED | OUTPATIENT
Start: 2022-07-19 | End: 2022-07-29 | Stop reason: HOSPADM

## 2022-07-19 RX ORDER — LOSARTAN POTASSIUM 25 MG/1
25 TABLET ORAL DAILY
Status: DISCONTINUED | OUTPATIENT
Start: 2022-07-19 | End: 2022-07-19

## 2022-07-19 RX ORDER — FLUTICASONE PROPIONATE 50 MCG
1 SPRAY, SUSPENSION (ML) NASAL DAILY
Status: DISCONTINUED | OUTPATIENT
Start: 2022-07-19 | End: 2022-07-29 | Stop reason: HOSPADM

## 2022-07-19 RX ORDER — HEPARIN SODIUM 1000 [USP'U]/ML
5200 INJECTION, SOLUTION INTRAVENOUS; SUBCUTANEOUS ONCE
Status: COMPLETED | OUTPATIENT
Start: 2022-07-19 | End: 2022-07-19

## 2022-07-19 RX ORDER — SODIUM CHLORIDE 0.9 % (FLUSH) 0.9 %
5-40 SYRINGE (ML) INJECTION EVERY 12 HOURS SCHEDULED
Status: DISCONTINUED | OUTPATIENT
Start: 2022-07-19 | End: 2022-07-25

## 2022-07-19 RX ORDER — ONDANSETRON 4 MG/1
4 TABLET, ORALLY DISINTEGRATING ORAL EVERY 8 HOURS PRN
Status: DISCONTINUED | OUTPATIENT
Start: 2022-07-19 | End: 2022-07-29 | Stop reason: HOSPADM

## 2022-07-19 RX ORDER — HEPARIN SODIUM 1000 [USP'U]/ML
2600 INJECTION, SOLUTION INTRAVENOUS; SUBCUTANEOUS ONCE
Status: COMPLETED | OUTPATIENT
Start: 2022-07-19 | End: 2022-07-19

## 2022-07-19 RX ORDER — ACETAMINOPHEN 650 MG/1
650 SUPPOSITORY RECTAL EVERY 6 HOURS PRN
Status: DISCONTINUED | OUTPATIENT
Start: 2022-07-19 | End: 2022-07-29 | Stop reason: HOSPADM

## 2022-07-19 RX ORDER — HEPARIN SODIUM 1000 [USP'U]/ML
40 INJECTION, SOLUTION INTRAVENOUS; SUBCUTANEOUS PRN
Status: DISCONTINUED | OUTPATIENT
Start: 2022-07-19 | End: 2022-07-19

## 2022-07-19 RX ORDER — MORPHINE SULFATE 4 MG/ML
4 INJECTION, SOLUTION INTRAMUSCULAR; INTRAVENOUS ONCE
Status: COMPLETED | OUTPATIENT
Start: 2022-07-19 | End: 2022-07-19

## 2022-07-19 RX ORDER — HEPARIN SODIUM 10000 [USP'U]/100ML
1830 INJECTION, SOLUTION INTRAVENOUS CONTINUOUS
Status: DISPENSED | OUTPATIENT
Start: 2022-07-19 | End: 2022-07-22

## 2022-07-19 RX ORDER — ATORVASTATIN CALCIUM 10 MG/1
10 TABLET, FILM COATED ORAL DAILY
Status: DISCONTINUED | OUTPATIENT
Start: 2022-07-19 | End: 2022-07-29 | Stop reason: HOSPADM

## 2022-07-19 RX ORDER — PANTOPRAZOLE SODIUM 40 MG/1
40 TABLET, DELAYED RELEASE ORAL
Status: DISCONTINUED | OUTPATIENT
Start: 2022-07-20 | End: 2022-07-29 | Stop reason: HOSPADM

## 2022-07-19 RX ORDER — MIDODRINE HYDROCHLORIDE 5 MG/1
10 TABLET ORAL
Status: DISCONTINUED | OUTPATIENT
Start: 2022-07-19 | End: 2022-07-29 | Stop reason: HOSPADM

## 2022-07-19 RX ORDER — POLYETHYLENE GLYCOL 3350 17 G/17G
17 POWDER, FOR SOLUTION ORAL DAILY
Status: DISCONTINUED | OUTPATIENT
Start: 2022-07-19 | End: 2022-07-19

## 2022-07-19 RX ORDER — HEPARIN SODIUM 1000 [USP'U]/ML
5200 INJECTION, SOLUTION INTRAVENOUS; SUBCUTANEOUS PRN
Status: DISCONTINUED | OUTPATIENT
Start: 2022-07-19 | End: 2022-07-29 | Stop reason: HOSPADM

## 2022-07-19 RX ORDER — ASPIRIN 81 MG/1
81 TABLET, CHEWABLE ORAL DAILY
Status: DISCONTINUED | OUTPATIENT
Start: 2022-07-19 | End: 2022-07-29 | Stop reason: HOSPADM

## 2022-07-19 RX ORDER — HEPARIN SODIUM 10000 [USP'U]/100ML
5-30 INJECTION, SOLUTION INTRAVENOUS CONTINUOUS
Status: DISCONTINUED | OUTPATIENT
Start: 2022-07-19 | End: 2022-07-19

## 2022-07-19 RX ORDER — M-VIT,TX,IRON,MINS/CALC/FOLIC 27MG-0.4MG
1 TABLET ORAL DAILY
Status: DISCONTINUED | OUTPATIENT
Start: 2022-07-19 | End: 2022-07-29 | Stop reason: HOSPADM

## 2022-07-19 RX ORDER — POLYETHYLENE GLYCOL 3350 17 G/17G
17 POWDER, FOR SOLUTION ORAL DAILY
Status: DISCONTINUED | OUTPATIENT
Start: 2022-07-20 | End: 2022-07-29 | Stop reason: HOSPADM

## 2022-07-19 RX ORDER — ONDANSETRON 2 MG/ML
4 INJECTION INTRAMUSCULAR; INTRAVENOUS ONCE
Status: COMPLETED | OUTPATIENT
Start: 2022-07-19 | End: 2022-07-19

## 2022-07-19 RX ORDER — ONDANSETRON 2 MG/ML
4 INJECTION INTRAMUSCULAR; INTRAVENOUS EVERY 6 HOURS PRN
Status: DISCONTINUED | OUTPATIENT
Start: 2022-07-19 | End: 2022-07-29 | Stop reason: HOSPADM

## 2022-07-19 RX ORDER — UBIDECARENONE 10 MG
10 CAPSULE ORAL DAILY
Status: DISCONTINUED | OUTPATIENT
Start: 2022-07-19 | End: 2022-07-29 | Stop reason: HOSPADM

## 2022-07-19 RX ADMIN — HEPARIN SODIUM 5200 UNITS: 1000 INJECTION INTRAVENOUS; SUBCUTANEOUS at 09:23

## 2022-07-19 RX ADMIN — ATORVASTATIN CALCIUM 10 MG: 10 TABLET, FILM COATED ORAL at 20:34

## 2022-07-19 RX ADMIN — HEPARIN SODIUM 1180 UNITS/HR: 10000 INJECTION, SOLUTION INTRAVENOUS at 09:26

## 2022-07-19 RX ADMIN — MORPHINE SULFATE 15 MG: 15 TABLET ORAL at 19:04

## 2022-07-19 RX ADMIN — SODIUM CHLORIDE 2436 ML: 9 INJECTION, SOLUTION INTRAVENOUS at 08:27

## 2022-07-19 RX ADMIN — SERTRALINE 25 MG: 50 TABLET, FILM COATED ORAL at 20:34

## 2022-07-19 RX ADMIN — MORPHINE SULFATE 4 MG: 4 INJECTION, SOLUTION INTRAMUSCULAR; INTRAVENOUS at 08:03

## 2022-07-19 RX ADMIN — ASPIRIN 81 MG: 81 TABLET, CHEWABLE ORAL at 20:34

## 2022-07-19 RX ADMIN — SODIUM CHLORIDE, PRESERVATIVE FREE 10 ML: 5 INJECTION INTRAVENOUS at 18:31

## 2022-07-19 RX ADMIN — ONDANSETRON 4 MG: 2 INJECTION INTRAMUSCULAR; INTRAVENOUS at 08:03

## 2022-07-19 RX ADMIN — SODIUM CHLORIDE, PRESERVATIVE FREE 10 ML: 5 INJECTION INTRAVENOUS at 20:34

## 2022-07-19 RX ADMIN — MIDODRINE HYDROCHLORIDE 10 MG: 5 TABLET ORAL at 17:43

## 2022-07-19 RX ADMIN — IOPAMIDOL 75 ML: 755 INJECTION, SOLUTION INTRAVENOUS at 08:20

## 2022-07-19 RX ADMIN — HEPARIN SODIUM 2600 UNITS: 1000 INJECTION INTRAVENOUS; SUBCUTANEOUS at 23:34

## 2022-07-19 ASSESSMENT — ENCOUNTER SYMPTOMS
WHEEZING: 0
VOMITING: 0
RHINORRHEA: 0
SHORTNESS OF BREATH: 1
COUGH: 0
NAUSEA: 0
PHOTOPHOBIA: 0
DIARRHEA: 0
BACK PAIN: 0
TACHYPNEA: 1
ABDOMINAL PAIN: 1
ABDOMINAL DISTENTION: 1

## 2022-07-19 ASSESSMENT — PAIN SCALES - GENERAL
PAINLEVEL_OUTOF10: 5
PAINLEVEL_OUTOF10: 2
PAINLEVEL_OUTOF10: 9

## 2022-07-19 ASSESSMENT — PAIN DESCRIPTION - LOCATION
LOCATION: ABDOMEN

## 2022-07-19 ASSESSMENT — PAIN DESCRIPTION - DESCRIPTORS
DESCRIPTORS: TIGHTNESS
DESCRIPTORS: TIGHTNESS

## 2022-07-19 ASSESSMENT — PAIN DESCRIPTION - ONSET: ONSET: ON-GOING

## 2022-07-19 ASSESSMENT — PAIN DESCRIPTION - FREQUENCY: FREQUENCY: CONTINUOUS

## 2022-07-19 ASSESSMENT — PAIN - FUNCTIONAL ASSESSMENT: PAIN_FUNCTIONAL_ASSESSMENT: 0-10

## 2022-07-19 NOTE — ED NOTES
Pt report called to RN Thersia Essex on [address] [facility], RN  07/19/22 7760

## 2022-07-19 NOTE — ED PROVIDER NOTES
Emergency Department Provider Note  Location: [facility]  ED  7/19/2022     Patient Identification  Emma Olvera is a 79 y.o. female    Chief Complaint  Shortness of Breath (States did not sleep all night d/t not being able to breathe. Diagnosed with ovarian cancer last week, appt with [facility] tomorrow. Room air 80%, distended abd.)          HPI  (History provided by patient)  Patient is a 49-year-old female with a history of hypertension, recently diagnosed ovarian cancer in the past few weeks who presents with increased abdominal distention and shortness of breath. Patient reports that she has had a constant achy sensation and abdominal distention over the past few weeks. Over the past 24 hours however she has become short of breath. She is noted to be hypoxic on arrival here placed on nonrebreather maintaining saturations. She denies any chest pain or lightheadedness. Denies any fevers chills cough sputum production. Passing stool passing flatus. I have reviewed the following nursing documentation:  Allergies: No Known Allergies    Past medical history:  has a past medical history of Anxiety and Hypertension. Past surgical history:  has a past surgical history that includes Colonoscopy (9-8-14). Home medications:   Prior to Admission medications    Medication Sig Start Date End Date Taking? Authorizing Provider   morphine (MSIR) 15 MG tablet Take 1 tablet by mouth every 4 hours as needed for Pain for up to 30 days.  7/14/22 8/13/22  Davidson Londono MD   omeprazole (PRILOSEC) 20 MG delayed release capsule Take 1 capsule by mouth every morning (before breakfast) 7/14/22   Davidson Londono MD   sennosides-docusate sodium (SENOKOT-S) 8.6-50 MG tablet Take 2 tablets by mouth daily 7/14/22   Davidson Londono MD   polyethylene glycol [facility]) 17 GM/SCOOP powder Take 17 g by mouth daily 7/10/22 8/9/22  Nanci Orosco MD   lovastatin (MEVACOR) 40 MG tablet TAKE ONE TAB BY MOUTH ONCE NIGHTLY 6/6/22   JENI Barger   losartan (COZAAR) 100 MG tablet TAKE ONE TAB BY MOUTH DAILY 6/6/22   JENI Barger   fluticasone (FLONASE) 50 MCG/ACT nasal spray SPRAY ONE SPRAY IN EACH NOSTRIL ONCE DAILY 4/11/22   JENI Barger   Multiple Vitamin (MULTIVITAMINS PO) Take by mouth  Patient not taking: Reported on 7/14/2022    Historical Provider, MD   sertraline (ZOLOFT) 50 MG tablet TAKE ONE TABLET BY MOUTH DAILY  Patient taking differently: 25 mg TAKE ONE TABLET BY MOUTH DAILY 1/20/22   Delfina Mclean MD   ibuprofen (ADVIL;MOTRIN) 800 MG tablet TAKE 1 TABLET EVERY 6 HOURSAS NEEDED 5/10/21   Delfina Mclean MD   aspirin 81 MG chewable tablet Take 81 mg by mouth    Historical Provider, MD   Coenzyme Q10 10 MG CAPS Take 10 mg by mouth    Historical Provider, MD   Calcium Carb-Cholecalciferol (CALCIUM CARBONATE-VITAMIN D3 PO) Take by mouth  Patient not taking: Reported on 7/14/2022    Historical Provider, MD       Social history:  reports that she quit smoking about 26 years ago. Her smoking use included cigarettes. She has a 10.00 pack-year smoking history. She has never used smokeless tobacco. She reports current alcohol use of about 2.0 standard drinks per week. She reports that she does not use drugs. Family history:    Family History   Problem Relation Age of Onset    Cancer Mother         Pancreatic Cancer    Diabetes Mother     High Blood Pressure Mother     Breast Cancer Maternal Aunt     Breast Cancer Maternal Cousin          ROS  Review of Systems   Constitutional:  Negative for chills and fever. HENT:  Negative for congestion and rhinorrhea. Eyes:  Negative for photophobia and visual disturbance. Respiratory:  Positive for shortness of breath. Negative for cough and wheezing. Cardiovascular:  Negative for chest pain and palpitations. Gastrointestinal:  Positive for abdominal distention and abdominal pain. Negative for diarrhea, nausea and vomiting.    Genitourinary: Negative for dysuria and hematuria. Musculoskeletal:  Negative for back pain and neck pain. Skin:  Negative for rash and wound. Neurological:  Negative for syncope and weakness. Psychiatric/Behavioral:  Negative for agitation and confusion. Exam  ED Triage Vitals [07/19/22 0719]   BP Temp Temp Source Heart Rate Resp SpO2 Height Weight   -- 98 °F (36.7 °C) Oral (!) 112 25 (!) 87 % 5' 4\" (1.626 m) 179 lb (81.2 kg)       Physical Exam  Vitals and nursing note reviewed. Constitutional:       General: She is not in acute distress. Appearance: She is well-developed. HENT:      Head: Normocephalic and atraumatic. Nose: Nose normal. No congestion. Eyes:      General: No scleral icterus. Extraocular Movements: Extraocular movements intact. Cardiovascular:      Rate and Rhythm: Normal rate and regular rhythm. Heart sounds: No murmur heard. Pulmonary:      Effort: Pulmonary effort is normal.      Breath sounds: Normal breath sounds. Comments: Tachypneic, speaking full sentences no wheezes rales or rhonchi  Abdominal:      General: There is no distension. Palpations: Abdomen is soft. Tenderness: There is no abdominal tenderness. Musculoskeletal:         General: No deformity. Normal range of motion. Cervical back: Normal range of motion and neck supple. Right lower leg: No edema. Left lower leg: No edema. Skin:     General: Skin is warm. Findings: No rash. Neurological:      Mental Status: She is alert and oriented to person, place, and time. Motor: No abnormal muscle tone.       Coordination: Coordination normal.   Psychiatric:         Mood and Affect: Mood normal.         Behavior: Behavior normal.         ED Course    ED Medication Orders (From admission, onward)      Start Ordered     Status Ordering Provider    07/19/22 0800 07/19/22 0800  iopamidol (ISOVUE-370) 76 % injection 75 mL  IMG ONCE PRN         Last MAR action: Given - by Bishnu Crumbly on 07/19/22 at [address] [facility], ERICA L    07/19/22 0800 07/19/22 0745  0.9 % sodium chloride IV bolus 2,436 mL  ONCE         Last MAR action: New Bag - by Tommy Martin on 07/19/22 at [address] [facility], ERICA L    07/19/22 0800 07/19/22 0746  morphine sulfate (PF) injection 4 mg  ONCE         Last [facility] action: [facility]    07/19/22 0800 07/19/22 0746  ondansetron (ZOFRAN) injection 4 mg  ONCE         Last MAR action: [facility]            EKG  Rhythm is sinus tachycardia  Rate is 110  Axis is normal  Conduction Abnormalities S1Q3T3 pattern  No STEMI criteria  Qtc is 386      Radiology  No results found.       Labs  Results for orders placed or performed during the hospital encounter of 07/19/22   CBC with Auto Differential   Result Value Ref Range    WBC 17.3 (H) 4.0 - 11.0 K/uL    RBC 3.98 (L) 4.00 - 5.20 M/uL    Hemoglobin 11.4 (L) 12.0 - 16.0 g/dL    Hematocrit 35.3 (L) 36.0 - 48.0 %    MCV 88.6 80.0 - 100.0 fL    MCH 28.7 26.0 - 34.0 pg    MCHC 32.4 31.0 - 36.0 g/dL    RDW 12.9 12.4 - 15.4 %    Platelets 961 757 - 839 K/uL    MPV 7.7 5.0 - 10.5 fL    Neutrophils % 81.4 %    Lymphocytes % 7.0 %    Monocytes % 11.2 %    Eosinophils % 0.1 %    Basophils % 0.3 %    Neutrophils Absolute 14.1 (H) 1.7 - 7.7 K/uL    Lymphocytes Absolute 1.2 1.0 - 5.1 K/uL    Monocytes Absolute 1.9 (H) 0.0 - 1.3 K/uL    Eosinophils Absolute 0.0 0.0 - 0.6 K/uL    Basophils Absolute 0.1 0.0 - 0.2 K/uL   Comprehensive Metabolic Panel w/ Reflex to MG   Result Value Ref Range    Sodium 127 (L) 136 - 145 mmol/L    Potassium reflex Magnesium 5.1 3.5 - 5.1 mmol/L    Chloride 91 (L) 99 - 110 mmol/L    CO2 21 21 - 32 mmol/L    Anion Gap 15 3 - 16    Glucose 224 (H) 70 - 99 mg/dL    BUN 29 (H) 7 - 20 mg/dL    CREATININE 1.8 (H) 0.6 - 1.2 mg/dL    GFR Non-African American 28 (A) >60    GFR  34 (A) >60    Calcium 9.9 8.3 - 10.6 mg/dL    Total Protein 6.6 6.4 - 8.2 g/dL    Albumin 3.7 3.4 - 5.0 g/dL    Albumin/Globulin Ratio 1.3 1.1 - 2.2    Total Bilirubin 0.5 0.0 - 1.0 mg/dL    Alkaline Phosphatase 58 40 - 129 U/L    ALT 27 10 - 40 U/L    AST 75 (H) 15 - 37 U/L   Troponin   Result Value Ref Range    Troponin 0.54 (HH) <0.01 ng/mL   Lactic Acid   Result Value Ref Range    Lactic Acid 3.7 (H) 0.4 - 2.0 mmol/L   Brain Natriuretic Peptide   Result Value Ref Range    Pro-BNP 2,799 (H) 0 - 124 pg/mL   EKG 12 Lead   Result Value Ref Range    Ventricular Rate 111 BPM    Atrial Rate 111 BPM    P-R Interval 148 ms    QRS Duration 80 ms    Q-T Interval 284 ms    QTc Calculation (Bazett) 386 ms    P Axis 7 degrees    R Axis 18 degrees    T Axis 22 degrees    Diagnosis       Sinus tachycardiaLow voltage QRSInferior infarct , age undeterminedPossible Anterolateral infarct , age undeterminedAbnormal ECGNo previous ECGs available         Procedures  Procedures      MDM  Patient seen and evaluated. Relevant records reviewed. - Patient is 79 y.o. female presented for complaints noted above  - Exam showed as noted above  - Workup here significant for metabolic derangements including leukocytosis and elevated troponin and BNP. She has no evidence of volume overload or pulmonary edema but does have some questionable ascites. PE study showing evidence of bilateral PEs with questionable right heart strain. Received a call from [facility] radiology as they have downtime on their radiology reading system currently. I am told there is no evidence of any acute bleed or other acute process noted in the abdominal scan. I am starting the patient on heparin. Her blood pressure is improving overall with IV fluid bolus which we will continue. Given the fact she is continuing to require 6 or more liters of oxygen and hypotension I have paged vascular surgery to consult for possible EKOS however it does appear that there is multiple clots bilaterally. Patient does have leukocytosis and elevated lactate however I think this is for the borderline obstructive shock and have low concern for sepsis at this time. Discussed with hospitalist.  She will require ICU admission.  - Patient treated as noted above  - I have a low concern for other emergent process, and do not see indication for further work-up in the ER, as it is unlikely  and poses more risk than benefit. - I discussed the results, including any incidental findings, with patient. Questions answered. We agreed to admit. Patient/family agreeable to plan and express understanding of plan. Clinical Impression:  No diagnosis found. Disposition:  Admit to CCU/ICU in critical condition. Blood pressure 89/72, pulse (!) 112, temperature 98 °F (36.7 °C), temperature source Oral, resp. rate 24, height 5' 4\" (1.626 m), weight 179 lb (81.2 kg), SpO2 93 %, not currently breastfeeding. Patient was given scripts for the following medications. I counseled patient how to take these medications. New Prescriptions    No medications on file       Disposition referral (if applicable):  No follow-up provider specified. I, Pat Payan, am the primary attending of record and contributed the majority of evaluation and treatment of emergent care for this encounter. Total critical care time is 45 minutes, which excludes separately billable procedures and updating family. Time spent is specifically for management of the presenting complaint and symptoms initially, direct bedside care, reevaluation, review of records, and consultation. There was a high probability of clinically significant life-threatening deterioration in the patient's condition, which required my urgent intervention.      This chart was generated in part by using Dragon Dictation system and may contain errors related to that system including errors in grammar, punctuation, and spelling, as well as words and phrases that may be inappropriate. If there are any questions or concerns please feel free to contact the dictating provider for clarification.      MD Chaya Fuentes MD  07/19/22 5739

## 2022-07-19 NOTE — PLAN OF CARE
Problem: Discharge Planning  Goal: Discharge to home or other facility with appropriate resources  Outcome: Progressing     Problem: Safety - Adult  Goal: Free from fall injury  Outcome: [de-identified]  Socks applied and patient instructed to call out for assistance to bathroom     Problem: Chronic Conditions and Co-morbidities  Goal: Patient's chronic conditions and co-morbidity symptoms are monitored and maintained or improved  Outcome: Progressing     Problem: Skin/Tissue Integrity  Goal: Absence of new skin breakdown  Description: 1. Monitor for areas of redness and/or skin breakdown  2. Assess vascular access sites hourly  3. Every 4-6 hours minimum:  Change oxygen saturation probe site  4. Every 4-6 hours:  If on nasal continuous positive airway pressure, respiratory therapy assess nares and determine need for appliance change or resting period.   Outcome: Progressing     Problem: Respiratory - Adult  Goal: Achieves optimal ventilation and oxygenation  Outcome: Progressing  Pt to maintain oxygen saturation >88%

## 2022-07-19 NOTE — ED NOTES
Arrived on NRB 15 liters, room air O2 at home 80%. Not on supplemental oxygen. Recent ovarian cancer diagnosis. Distended abdomen. States normal BM at home. Decreased PO intake. Put on 5 liters nasal cannula here, not maintaining normal O2. Put back on NRB 10 liters, now 95%.      Marleni Harrison RN  07/19/22 4283

## 2022-07-19 NOTE — PROGRESS NOTES
Nephrology consult received    Labs ordered    DC Losartan    Avoid NSAIDs or contrast    Keep MAP > 65 mmhg    Keep SBP > 120    Full note to follow

## 2022-07-19 NOTE — FLOWSHEET NOTE
07/19/22 1400   Vital Signs   Temp 96.8 °F (36 °C)   Temp Source Axillary   Heart Rate 93   Heart Rate Source Monitor   Resp 22   /82   BP Location Right upper arm   MAP (Calculated) 93   Patient Position Semi fowlers   Level of Consciousness Alert (0)   MEWS Score 2   Pain Assessment   Pain Assessment None - Denies Pain   Oxygen Therapy   SpO2 96 %   O2 Device Nasal cannula   O2 Flow Rate (L/min) 6 L/min

## 2022-07-19 NOTE — CONSULTS
[facility]  [phone]      Attending Physician: Rg Valero MD  Reason for Consultation/Chief Complaint: Shortness of breath    Subjective   History of Present Illness:  Holden Armas is a 79 y.o. patient who presented to the hospital with complaints of shortness of breath over the last 1 to 2 days. She presented emergency room today and was diagnosed with multiple PEs. In course of work-up troponin level was found to be elevated 0.5. She denies chest pain. She also notes abdominal distention. She had recently diagnosed ovarian cancer. She says she had a remote evaluation for a stroke with a cardiologist but does not follow-up for any chronic cardiac conditions. Past Medical History:   has a past medical history of Anxiety, Hypertension, and Ovarian ca ([address] Surgical History:   has a past surgical history that includes Colonoscopy (9-8-14). Social History:   reports that she quit smoking about 26 years ago. Her smoking use included cigarettes. She has a 10.00 pack-year smoking history. She has never used smokeless tobacco. She reports current alcohol use of about 2.0 standard drinks per week. She reports that she does not use drugs. Family History:  family history includes Breast Cancer in her maternal aunt, maternal cousin, and sister; Cancer in her mother; Diabetes in her mother; High Blood Pressure in her mother. Home Medications:  Were reviewed and are listed in nursing record and/or below  Prior to Admission medications    Medication Sig Start Date End Date Taking? Authorizing Provider   morphine (MSIR) 15 MG tablet Take 1 tablet by mouth every 4 hours as needed for Pain for up to 30 days.  7/14/22 8/13/22  Marlon Bettencourt MD   omeprazole (PRILOSEC) 20 MG delayed release capsule Take 1 capsule by mouth every morning (before breakfast) 7/14/22   Marlon Bettencourt MD   sennosides-docusate sodium (SENOKOT-S) 8.6-50 MG tablet Take 2 tablets by mouth daily 7/14/22   Evette Moreno MD   polyethylene glycol [facility]) 17 GM/SCOOP powder Take 17 g by mouth daily 7/10/22 8/9/22  Eleno Judd MD   lovastatin (MEVACOR) 40 MG tablet TAKE ONE TAB BY MOUTH ONCE NIGHTLY 6/6/22   JENI Lizama   losartan (COZAAR) 100 MG tablet TAKE ONE TAB BY MOUTH DAILY 6/6/22   JENI Lizama   fluticasone (FLONASE) 50 MCG/ACT nasal spray SPRAY ONE SPRAY IN EACH NOSTRIL ONCE DAILY 4/11/22   JENI Lizama   Multiple Vitamin (MULTIVITAMINS PO) Take by mouth  Patient not taking: Reported on 7/14/2022    Historical Provider, MD   sertraline (ZOLOFT) 50 MG tablet TAKE ONE TABLET BY MOUTH DAILY  Patient taking differently: 25 mg TAKE ONE TABLET BY MOUTH DAILY 1/20/22   Evette Moreno MD   ibuprofen (ADVIL;MOTRIN) 800 MG tablet TAKE 1 TABLET EVERY 6 HOURSAS NEEDED 5/10/21   Evette Moreno MD   aspirin 81 MG chewable tablet Take 81 mg by mouth    Historical Provider, MD   Coenzyme Q10 10 MG CAPS Take 10 mg by mouth    Historical Provider, MD   Calcium Carb-Cholecalciferol (CALCIUM CARBONATE-VITAMIN D3 PO) Take by mouth  Patient not taking: Reported on 7/14/2022    Historical Provider, MD        CURRENT Medications:  heparin (porcine) injection 5,200 Units, PRN  heparin (porcine) injection 2,600 Units, PRN  heparin 25,000 units in dextrose 5% 250 mL (premix) infusion, Continuous  aspirin chewable tablet 81 mg, Daily  Coenzyme Q10 CAPS 10 mg, Daily  fluticasone (FLONASE) 50 MCG/ACT nasal spray 1 spray, Daily  losartan (COZAAR) tablet 25 mg, Daily  atorvastatin (LIPITOR) tablet 10 mg, Daily  morphine (MSIR) tablet 15 mg, Q4H PRN  multivitamin capsule 1 capsule, Daily  [START ON 7/20/2022] pantoprazole (PROTONIX) tablet 40 mg, QAM AC  polyethylene glycol (GLYCOLAX) powder 17 g, Daily  sennosides-docusate sodium (SENOKOT-S) 8.6-50 MG tablet 2 tablet, Daily  sertraline (ZOLOFT) tablet 25 mg, Daily  sodium chloride flush 0.9 % injection 5-40 mL, 2 times per day  sodium chloride flush 0.9 % injection 5-40 mL, PRN  0.9 % sodium chloride infusion, PRN  ondansetron (ZOFRAN-ODT) disintegrating tablet 4 mg, Q8H PRN   Or  ondansetron (ZOFRAN) injection 4 mg, Q6H PRN  acetaminophen (TYLENOL) tablet 650 mg, Q6H PRN   Or  acetaminophen (TYLENOL) suppository 650 mg, Q6H PRN  perflutren lipid microspheres (DEFINITY) injection 1.65 mg, ONCE PRN  senna (SENOKOT) 8.8 MG/5ML syrup 8.8 mg, BID PRN        Allergies:  Patient has no known allergies. Review of Systems:   A 14 point review of symptoms completed. Pertinent positives identified in the HPI, all other review of symptoms negative as below.       Objective   PHYSICAL EXAM:    Vitals:    07/19/22 1100   BP: 89/73   Pulse: 96   Resp: 20   Temp:    SpO2: 96%    Weight: 179 lb (81.2 kg)         General Appearance:  Alert, cooperative, no distress, appears stated age, lying flat, able to nearly speak in full sentences   Head:  Normocephalic, without obvious abnormality, atraumatic   Eyes:  PERRL, conjunctiva/corneas clear   Nose: Nares normal, no drainage or sinus tenderness   Throat: Lips, mucosa, and tongue normal   Neck: Supple, no JVP   Lungs:   Clear to auscultation bilaterally, respirations unlabored   Chest Wall:  No deformity or tenderness   Heart:  Regular rate and rhythm, S1, S2 normal, distant heart sounds   Abdomen:   Soft, but distended masses, no organomegaly   Extremities: Extremities +2 bilateral lower extremity edema   Pulses: 2+ and symmetric   Skin: Skin color, texture, turgor normal, no rashes or lesions   Pysch: Normal mood and affect   Neurologic: Normal gross motor and sensory exam.         Labs   CBC:   Lab Results   Component Value Date/Time    WBC 17.3 07/19/2022 07:40 AM    RBC 3.98 07/19/2022 07:40 AM    HGB 11.4 07/19/2022 07:40 AM    HCT 35.3 07/19/2022 07:40 AM    MCV 88.6 07/19/2022 07:40 AM    RDW 12.9 07/19/2022 07:40 AM     07/19/2022 07:40 AM     CMP:  Lab Results   Component Value Date/Time     07/19/2022 07:40 AM    K 5.1 07/19/2022 07:40 AM    CL 91 07/19/2022 07:40 AM    CO2 21 07/19/2022 07:40 AM    BUN 29 07/19/2022 07:40 AM    CREATININE 1.8 07/19/2022 07:40 AM    GFRAA 34 07/19/2022 07:40 AM    GFRAA >60 05/22/2013 09:57 AM    AGRATIO 1.3 07/19/2022 07:40 AM    LABGLOM 28 07/19/2022 07:40 AM    GLUCOSE 224 07/19/2022 07:40 AM    PROT 6.6 07/19/2022 07:40 AM    CALCIUM 9.9 07/19/2022 07:40 AM    BILITOT 0.5 07/19/2022 07:40 AM    ALKPHOS 58 07/19/2022 07:40 AM    AST 75 07/19/2022 07:40 AM    ALT 27 07/19/2022 07:40 AM     PT/INR:  No results found for: PTINR  HgBA1c:  Lab Results   Component Value Date    LABA1C 5.7 03/28/2022     Lab Results   Component Value Date    TROPONINI 0.54 ([facility]) 07/19/2022         Cardiac Data     Last EKG: Sinus tachycardia, low voltage, possible anterior/inferior infarct pattern, similar to prior EKG from July 10, 2022, the infarct patterns are also similar to prior EKG from 2020. Echo Stress Test:    2020       Summary   Baseline resting EKG shows NSR. low amplitude QRS. Patient exercised on treadmill according to standard [place] protocol for 6:19   minutes achieving peak heart rate of 141/min (90% of maximum predicted)   Normal BP response to stress. Rare isolated PVC during exercise. There was stress induced shortness of breath. Symptoms resolved with rest.   Images obtained in 39 seconds with aid of Definity contrast.   Baseline resting echocardiogram shows normal global LV systolic function   with an ejection fraction of 60% and uniform myocardial segmental wall   motion. Following stress there was uniform augmentation of all myocardial   segments with appropriate hyperdynamic LV systolic response to stress. No EKG changes of stress induced left ventricular ischemia.    Normal stress echocardiogram.    Cath:    Studies:     Ct chest    Impression   Chest:       Bilateral pulmonary emboli, left greater than right, with bilateral pleural effusions, left greater than right and adjacent consolidation of the lungs,   likely atelectasis. There is mild dilatation of the right heart, IVC and   hepatic veins suggesting a component of right heart insufficiency. Results   discussed with [facility] [address] [facility] by Fernando Mendieta. Janay Bruno MD at 8:52 Am on   7/19/2022       Abdomen and pelvis:       Abdominal and pelvic ascites with findings of peritoneal carcinomatosis,   presumably due to ovarian carcinoma due to the nodular and irregular   appearance of the ovaries. I have reviewed labs and imaging/xray/diagnostic testing in this note. Assessment and Plan          Patient Active Problem List   Diagnosis    HTN (hypertension)    HLD (hyperlipidemia)    Low back pain    Benign neoplasm of colon    Swelling, mass, or lump in head and neck    Chronic sinusitis    BERNIE (generalized anxiety disorder)    Major depressive disorder with single episode, in full remission (HCC)    Generalized abdominal pain    Adnexal mass    Peritoneal carcinomatosis (HCC)    Malignant ascites    Anorexia    Reactive depression    Bilateral pulmonary embolism (HCC)       Shortness of breath, likely related to PEs, elevated troponins likely related to pulmonary embolism, is not likely related to coronary ischemia. Will defer treatment with anticoagulation and possible EKOS to primary service as well as pulmonary and vascular surgery. Can consider echocardiogram, patient would not require cardiac ischemia testing at this time. Ovarian cancer with metastases, as per primary service and oncology    Hchol, statin    Htn, resume home meds as hemodynamics allow      No further inpatient cardiac work-up or treatment is planned, will sign off, please call with questions        Thank you for allowing us to participate in the care of Jagdish Moreland. Please call me with any questions [phone].     Caity Rodriges MD, [facility]   Interventional Cardiologist  [facility]  [phone])

## 2022-07-19 NOTE — CONSULTS
Pulmonary & Critical Care   Consult Note        Patient Name:  Smith Edwards ADMISSION/CC: Pulmonary embolism     PCP: Shravan Amanda MD    HISTORY OF PRESENT ILLNESS:   79y.o. year old female known case of hypertension, dyslipidemia who was recently diagnosed of metastatic ovarian cancer after having 2 months history of abdominal discomfort and distention the patient was doing well until few days ago when she started noticing shortness of breath and left leg pain the patient this morning was very short of breath she denied chest pain but she felt lightheaded she has no syncope no hemoptysis no orthopnea or PND no edema of the lower extremities no fever  She been having this abdominal pain constipation and abdominal distention for the last 2 to 3 months. Past medical history as mentioned above  Past surgical history is negative  Family history reviewed and not pertinent  Social history she is a lifelong non-smoker does not drink alcohol    Past Medical History:   Diagnosis Date    Anxiety     Hypertension     Ovarian ca [facility])        Past Surgical History:   Procedure Laterality Date    COLONOSCOPY  14    colon polyp removed, diverticulosis       family history includes Breast Cancer in her maternal aunt, maternal cousin, and sister; Cancer in her mother; Diabetes in her mother; High Blood Pressure in her mother. Social History     Tobacco Use    Smoking status: Former     Packs/day: 0.50     Years: 20.00     Pack years: 10.00     Types: Cigarettes     Quit date: 5/3/1996     Years since quittin.2    Smokeless tobacco: Never   Substance Use Topics    Alcohol use:  Yes     Alcohol/week: 2.0 standard drinks     Types: 2 Cans of beer per week        Meds:    Current Facility-Administered Medications:     heparin (porcine) injection 5,200 Units, 5,200 Units, IntraVENous, PRN, Courtney Oropeza MD    heparin (porcine) injection 2,600 Units, 2,600 Units, IntraVENous, PRN, Kelsi Peng MD Lauren    heparin 25,000 units in dextrose 5% 250 mL (premix) infusion, 1,180 Units/hr, IntraVENous, Continuous, Thee Lombardo MD, Last Rate: 11.8 mL/hr at 07/19/22 0926, 1,180 Units/hr at 07/19/22 0926    aspirin chewable tablet 81 mg, 81 mg, Oral, Daily, Thee Lombardo MD    Coenzyme Q10 CAPS 10 mg, 10 mg, Oral, Daily, Thee Lombardo MD    fluticasone (FLONASE) 50 MCG/ACT nasal spray 1 spray, 1 spray, Each Nostril, Daily, Thee Lombardo MD    atorvastatin (LIPITOR) tablet 10 mg, 10 mg, Oral, Daily, Thee Lombardo MD    morphine (MSIR) tablet 15 mg, 15 mg, Oral, Q4H PRN, Thee Lombardo MD    therapeutic multivitamin-minerals 1 tablet, 1 tablet, Oral, Daily, MD Marleen Pollard ON 7/20/2022] pantoprazole (PROTONIX) tablet 40 mg, 40 mg, Oral, QAM AC, Thee Lombardo MD    polyethylene glycol (GLYCOLAX) powder 17 g, 17 g, Oral, Daily, Thee Lombardo MD    sennosides-docusate sodium (SENOKOT-S) 8.6-50 MG tablet 2 tablet, 2 tablet, Oral, Daily, Thee Lombardo MD    sertraline (ZOLOFT) tablet 25 mg, 25 mg, Oral, Daily, Thee Lombardo MD    sodium chloride flush 0.9 % injection 5-40 mL, 5-40 mL, IntraVENous, 2 times per day, Thee Lombardo MD    sodium chloride flush 0.9 % injection 5-40 mL, 5-40 mL, IntraVENous, PRN, Thee Lombardo MD    0.9 % sodium chloride infusion, , IntraVENous, PRN, Thee Lombardo MD    ondansetron (ZOFRAN-ODT) disintegrating tablet 4 mg, 4 mg, Oral, Q8H PRN **OR** ondansetron (ZOFRAN) injection 4 mg, 4 mg, IntraVENous, Q6H PRN, Thee Lombardo MD    acetaminophen (TYLENOL) tablet 650 mg, 650 mg, Oral, Q6H PRN **OR** acetaminophen (TYLENOL) suppository 650 mg, 650 mg, Rectal, Q6H PRN, Thee Lombardo MD    perflutren lipid microspheres (DEFINITY) injection 1.65 mg, 1.5 mL, IntraVENous, ONCE PRN, Thee Lombardo MD    senna (SENOKOT) tablet 8.6 mg, 1 tablet, Oral, BID PRN, Milena Capellan Refill: Brisk,3 seconds, normal  Peripheral Pulses: +2 palpable, equal bilaterally  Data Reviewed:   LABS:  :   Recent Labs     07/19/22  0740   WBC 17.3*   HGB 11.4*   HCT 35.3*   MCV 88.6        BMP:   Recent Labs     07/19/22  0740   *   K 5.1   CL 91*   CO2 21   BUN 29*   CREATININE 1.8*     PROFILE:   Recent Labs     07/19/22  0740   AST 75*   ALT 27   BILITOT 0.5   ALKPHOS 58     PT/INR:   Recent Labs     07/19/22  0740   PROTIME 15.6*   INR 1.26*     APTT:  Recent Labs     07/19/22  0740   APTT 33.8     :  Recent Labs     07/19/22  1452   COLORU Yellow   PHUR 6.0   WBCUA 3-5   RBCUA 5-10*   BACTERIA Rare*   CLARITYU Clear   SPECGRAV 1.010   LEUKOCYTESUR Negative   UROBILINOGEN 0.2   BILIRUBINUR Negative   BLOODU Negative   GLUCOSEU Negative   AMORPHOUS 1+     No results for input(s): PHART, KRR2TLU, PO2ART in the last 72 hours. Plan:     Submassive pulmonary embolism provoked by underlying ovarian cancer she is currently on heparin need to be transitioned to oral anticoagulation NOAC upon discharge  for  extended period of time  There is evidence of RV strain on CT scan with the patient is not a candidate for any kind of intervention      Acute hypoxic respiratory failure she was on 6 L that was weaned down to 4 L keep weaning oxygen to keep her pulse oximetry more than 88%. Metastatic ovarian cancer oncology to follow    Other issues are managed by other consulting services and the primary service.     Thanks for the consultation    Joey Kirkpatrick MD  July 19, 2022  5:52 PM

## 2022-07-19 NOTE — ED NOTES
Pt maintaining O2 on nasal cannula. Color better. Resting comfortably. BP remains soft. IVF infusing at controlled rate to gravity.      Roland Mock RN  07/19/22 0595

## 2022-07-19 NOTE — PROGRESS NOTES
4 Eyes Skin Assessment     The patient is being assess for   Admission    I agree that 2 RN's have performed a thorough Head to Toe Skin Assessment on the patient. ALL assessment sites listed below have been assessed. Areas assessed for pressure by both nurses:   [x]   Head, Face, and Ears   [x]   Shoulders, Back, and Chest, Abdomen  [x]   Arms, Elbows, and Hands   [x]   Coccyx, Sacrum, and Ischium  [x]   Legs, Feet, and Heels        Skin Assessed Under all Medical Devices by both nurses:  O2 device tubing              All Mepilex Borders were peeled back and area peeked at by both nurses:  No:    Please list where Mepilex Borders are located:              Co-signer eSignature: Electronically signed by Jess Mims RN on 7/19/22 at 5:46 PM EDT    Does the Patient have Skin Breakdown related to pressure?   No            Gene Prevention initiated:  Yes   Wound Care Orders initiated:  No      C nurse consulted for Pressure Injury (Stage 3,4, Unstageable, DTI, NWPT, Complex wounds)and New or Established Ostomies:  No      Primary Nurse eSignature: Electronically signed by Eduin Maher RN on 7/19/22 at 3:20 PM EDT

## 2022-07-19 NOTE — PLAN OF CARE
PLAN OF CARE    Received request for inpatient admission. Admitting provider Berenice Moore MD notified.     Berenice Moore MD  7/19/2022  9:03 AM

## 2022-07-19 NOTE — H&P
Hospital Medicine History & Physical      PCP: Magdalena Paredes MD    Date of Admission: 7/19/2022    Date of Service: Pt seen/examined on 7/19/2022 and Admitted to Inpatient with expected LOS greater than two midnights due to medical therapy. Chief Complaint:    Shortness of breath    History Of Present Illness:    79 y.o. female who presented to [facility] with increasing shortness of breath. Patient recently diagnosed with ovarian cancer on 7/10/2022. Patient had presented to the emergency department with increasing abdominal distention. CAT scan shows metastatic ovarian CA. Patient was scheduled to see Dr. Yumiko Tang in [facility] tomorrow. Over the past week patient with progressive shortness of breath. She states yesterday it became severe. She denies history of COPD. Remote tobacco history. No oxygen use at home. Patient has been using morphine for pain control. Abdominal distention secondary to ascites per previous CT scan. Patient denies any swelling in her legs although she has had some discomfort in her upper thighs. Her last BM was this morning no blood or melena noted. Past Medical History:          Diagnosis Date    Anxiety     Hypertension     Ovarian ca [facility])        Past Surgical History:          Procedure Laterality Date    COLONOSCOPY  9-8-14    colon polyp removed, diverticulosis       Medications Prior to Admission:      Prior to Admission medications    Medication Sig Start Date End Date Taking? Authorizing Provider   morphine (MSIR) 15 MG tablet Take 1 tablet by mouth every 4 hours as needed for Pain for up to 30 days.  7/14/22 8/13/22  Delfina Mclean MD   omeprazole (PRILOSEC) 20 MG delayed release capsule Take 1 capsule by mouth every morning (before breakfast) 7/14/22   Delfina Mclean MD   sennosides-docusate sodium (SENOKOT-S) 8.6-50 MG tablet Take 2 tablets by mouth daily 7/14/22   Delfina Mclean MD   polyethylene glycol (GLYCOLAX) 17 GM/SCOOP powder Take 17 g by mouth daily 7/10/22 8/9/22  Yesy Mcqueen MD   lovastatin (MEVACOR) 40 MG tablet TAKE ONE TAB BY MOUTH ONCE NIGHTLY 22   JENI Barnhart   losartan (COZAAR) 100 MG tablet TAKE ONE TAB BY MOUTH DAILY 22   JENI Barnhart   fluticasone (FLONASE) 50 MCG/ACT nasal spray SPRAY ONE SPRAY IN EACH NOSTRIL ONCE DAILY 22   JENI Barnhart   Multiple Vitamin (MULTIVITAMINS PO) Take by mouth  Patient not taking: Reported on 2022    Historical Provider, MD   sertraline (ZOLOFT) 50 MG tablet TAKE ONE TABLET BY MOUTH DAILY  Patient taking differently: 25 mg TAKE ONE TABLET BY MOUTH DAILY 22   Hawa Crespo MD   ibuprofen (ADVIL;MOTRIN) 800 MG tablet TAKE 1 TABLET EVERY 6 HOURSAS NEEDED 5/10/21   Hawa Crespo MD   aspirin 81 MG chewable tablet Take 81 mg by mouth    Historical Provider, MD   Coenzyme Q10 10 MG CAPS Take 10 mg by mouth    Historical Provider, MD   Calcium Carb-Cholecalciferol (CALCIUM CARBONATE-VITAMIN D3 PO) Take by mouth  Patient not taking: Reported on 2022    Historical Provider, MD   Home medications reviewed with patient. Allergies:  Patient has no known allergies. Social History:      The patient currently lives at home    TOBACCO:   reports that she quit smoking about 26 years ago. Her smoking use included cigarettes. She has a 10.00 pack-year smoking history. She has never used smokeless tobacco.  ETOH:   reports current alcohol use of about 2.0 standard drinks per week. E-cigarette/Vaping       Questions Responses    E-cigarette/Vaping Use     Start Date     Passive Exposure     Quit Date     Counseling Given     Comments               Family History:      Reviewed and negative in regards to presenting illness/complaint. Sister recently diagnosed with breast cancer. Mother with pancreatic cancer - .         Problem Relation Age of Onset    Cancer Mother         Pancreatic Cancer    Diabetes Mother     High Blood Pressure Mother Breast Cancer Sister     Breast Cancer Maternal Aunt     Breast Cancer Maternal Cousin        REVIEW OF SYSTEMS COMPLETED:   Pertinent positives as noted in the HPI. All other systems reviewed and negative. PHYSICAL EXAM PERFORMED:    BP 88/71   Pulse (!) 103   Temp 98 °F (36.7 °C) (Oral)   Resp 21   Ht 5' 4\" (1.626 m)   Wt 179 lb (81.2 kg)   LMP  (LMP Unknown)   SpO2 91%   BMI 30.73 kg/m²     General appearance: Appears slightly anxious. No apparent distress, appears stated age and cooperative. HEENT:  Normal cephalic, atraumatic without obvious deformity. Pupils equal, round, and reactive to light. Extra ocular muscles intact. Conjunctivae/corneas clear. Neck: Supple, with full range of motion. No jugular venous distention. Trachea midline. Respiratory:  Normal respiratory effort. Clear to auscultation, bilaterally without Rales/Wheezes/Rhonchi. [address] air entry. Cardiovascular: Tachycardic. Regular rhythm with normal S1/S2 without murmurs, rubs or gallops. Abdomen: Abdomen distended and tense. Dullness in the flanks bilaterally. Positive bowel sounds noted. Musculoskeletal: Edema in left lower leg. Skin: Skin color, texture, turgor normal.  No rashes or lesions. Neurologic:  Neurovascularly intact without any focal sensory/motor deficits.  Cranial nerves: II-XII intact, grossly non-focal.  Psychiatric:  Alert and oriented, thought content appropriate, normal insight  Capillary Refill: Brisk,3 seconds, normal  Peripheral Pulses: +2 palpable, equal bilaterally       Labs:     Recent Labs     07/19/22  0740   WBC 17.3*   HGB 11.4*   HCT 35.3*        Recent Labs     07/19/22  0740   *   K 5.1   CL 91*   CO2 21   BUN 29*   CREATININE 1.8*   CALCIUM 9.9     Recent Labs     07/19/22  0740   AST 75*   ALT 27   BILITOT 0.5   ALKPHOS 58     Recent Labs     07/19/22  0740   INR 1.26*     Recent Labs     07/19/22  0740   TROPONINI 0.54*       Urinalysis:      Lab Results   Component Value Date/Time    NITRU Negative 07/10/2022 04:54 PM    BLOODU Negative 07/10/2022 04:54 PM    SPECGRAV <=1.005 07/10/2022 04:54 PM    GLUCOSEU Negative 07/10/2022 04:54 PM       Radiology:     CXR: I have reviewed the CXR with the following interpretation: No acute change  EKG:  I have reviewed the EKG with the following interpretation: ST with anterolat infarct. Present 7/10/22    XR CHEST PORTABLE    (Results Pending)   CT CHEST PULMONARY EMBOLISM W CONTRAST    (Results Pending)   CT ABDOMEN PELVIS W IV CONTRAST Additional Contrast? None    (Results Pending)       Consults:    IP CONSULT TO HOSPITALIST    ASSESSMENT:    There are no active hospital problems to display for this patient. PLAN:    1. Bilateral pulmonary emboli. Will consult pulmonary. Heme-onc will also be consulted due to underlying malignancy. Patient placed on heparin drip. Will obtain echocardiogram.  Cardiology consulted. We will check lower extremity Dopplers to rule out DVT. Vascular surgery consulted. 2.  Elevated troponin. This may be due to demand ischemia due to tachycardia. Will trend troponins. Cardiology consulted. Patient will be on heparin drip. 3.  Ovarian cancer. Heme-onc will be consulted. 4.  Hypertension. Patient's blood pressure currently low. Will hold losartan at this time. DVT Prophylaxis: Heparin drip  Diet: No diet orders on file  Code Status: No Order    PT/OT Eval Status: Pending    Dispo -Home when stable in 2 to 3 days. Robbie Hunter MD    Thank you Hemalatha Shaikh MD for the opportunity to be involved in this patient's care. If you have any questions or concerns please feel free to contact me at [phone].

## 2022-07-20 ENCOUNTER — APPOINTMENT (OUTPATIENT)
Dept: CARDIAC CATH/INVASIVE PROCEDURES | Age: 67
DRG: 754 | End: 2022-07-20
Payer: MEDICARE

## 2022-07-20 PROBLEM — I82.4Y3 ACUTE DEEP VEIN THROMBOSIS (DVT) OF PROXIMAL VEIN OF BOTH LOWER EXTREMITIES (HCC): Status: ACTIVE | Noted: 2022-07-20

## 2022-07-20 LAB
ALBUMIN SERPL-MCNC: 3.3 G/DL (ref 3.4–5)
ALP BLD-CCNC: 54 U/L (ref 40–129)
ALT SERPL-CCNC: 43 U/L (ref 10–40)
ANION GAP SERPL CALCULATED.3IONS-SCNC: 14 MMOL/L (ref 3–16)
ANTI-XA UNFRAC HEPARIN: 0.12 IU/ML (ref 0.3–0.7)
ANTI-XA UNFRAC HEPARIN: 0.22 IU/ML (ref 0.3–0.7)
ANTI-XA UNFRAC HEPARIN: 0.24 IU/ML (ref 0.3–0.7)
AST SERPL-CCNC: 83 U/L (ref 15–37)
BASOPHILS ABSOLUTE: 0.1 K/UL (ref 0–0.2)
BASOPHILS RELATIVE PERCENT: 1 %
BILIRUB SERPL-MCNC: 0.4 MG/DL (ref 0–1)
BILIRUBIN DIRECT: <0.2 MG/DL (ref 0–0.3)
BILIRUBIN, INDIRECT: ABNORMAL MG/DL (ref 0–1)
BUN BLDV-MCNC: 33 MG/DL (ref 7–20)
CA 125: ABNORMAL U/ML (ref 0–35)
CALCIUM SERPL-MCNC: 10 MG/DL (ref 8.3–10.6)
CHLORIDE BLD-SCNC: 96 MMOL/L (ref 99–110)
CO2: 20 MMOL/L (ref 21–32)
CREAT SERPL-MCNC: 1 MG/DL (ref 0.6–1.2)
EKG ATRIAL RATE: 76 BPM
EKG DIAGNOSIS: NORMAL
EKG P AXIS: -39 DEGREES
EKG P-R INTERVAL: 140 MS
EKG Q-T INTERVAL: 338 MS
EKG QRS DURATION: 70 MS
EKG QTC CALCULATION (BAZETT): 380 MS
EKG R AXIS: 70 DEGREES
EKG T AXIS: 75 DEGREES
EKG VENTRICULAR RATE: 76 BPM
EOSINOPHILS ABSOLUTE: 0 K/UL (ref 0–0.6)
EOSINOPHILS RELATIVE PERCENT: 0 %
GFR AFRICAN AMERICAN: >60
GFR NON-AFRICAN AMERICAN: 55
GLUCOSE BLD-MCNC: 126 MG/DL (ref 70–99)
HCT VFR BLD CALC: 31.5 % (ref 36–48)
HEMATOLOGY PATH CONSULT: NORMAL
HEMATOLOGY PATH CONSULT: YES
HEMOGLOBIN: 10.6 G/DL (ref 12–16)
LYMPHOCYTES ABSOLUTE: 0.8 K/UL (ref 1–5.1)
LYMPHOCYTES RELATIVE PERCENT: 7 %
MCH RBC QN AUTO: 29.3 PG (ref 26–34)
MCHC RBC AUTO-ENTMCNC: 33.5 G/DL (ref 31–36)
MCV RBC AUTO: 87.4 FL (ref 80–100)
MONOCYTES ABSOLUTE: 1.6 K/UL (ref 0–1.3)
MONOCYTES RELATIVE PERCENT: 15 %
NEUTROPHILS ABSOLUTE: 8.4 K/UL (ref 1.7–7.7)
NEUTROPHILS RELATIVE PERCENT: 77 %
PDW BLD-RTO: 12.9 % (ref 12.4–15.4)
PLATELET # BLD: 232 K/UL (ref 135–450)
PLATELET SLIDE REVIEW: ADEQUATE
PMV BLD AUTO: 8 FL (ref 5–10.5)
POTASSIUM REFLEX MAGNESIUM: 5.2 MMOL/L (ref 3.5–5.1)
RBC # BLD: 3.6 M/UL (ref 4–5.2)
SLIDE REVIEW: ABNORMAL
SODIUM BLD-SCNC: 130 MMOL/L (ref 136–145)
TOTAL PROTEIN: 6.2 G/DL (ref 6.4–8.2)
WBC # BLD: 10.9 K/UL (ref 4–11)

## 2022-07-20 PROCEDURE — 80076 HEPATIC FUNCTION PANEL: CPT

## 2022-07-20 PROCEDURE — 2580000003 HC RX 258: Performed by: HOSPITALIST

## 2022-07-20 PROCEDURE — 6360000002 HC RX W HCPCS

## 2022-07-20 PROCEDURE — 99221 1ST HOSP IP/OBS SF/LOW 40: CPT | Performed by: SURGERY

## 2022-07-20 PROCEDURE — 99223 1ST HOSP IP/OBS HIGH 75: CPT | Performed by: HOSPITALIST

## 2022-07-20 PROCEDURE — 6360000002 HC RX W HCPCS: Performed by: INTERNAL MEDICINE

## 2022-07-20 PROCEDURE — 6360000004 HC RX CONTRAST MEDICATION

## 2022-07-20 PROCEDURE — 85025 COMPLETE CBC W/AUTO DIFF WBC: CPT

## 2022-07-20 PROCEDURE — 2060000000 HC ICU INTERMEDIATE R&B

## 2022-07-20 PROCEDURE — 2700000000 HC OXYGEN THERAPY PER DAY

## 2022-07-20 PROCEDURE — B5191ZZ FLUOROSCOPY OF INFERIOR VENA CAVA USING LOW OSMOLAR CONTRAST: ICD-10-PCS | Performed by: INTERNAL MEDICINE

## 2022-07-20 PROCEDURE — 93005 ELECTROCARDIOGRAM TRACING: CPT | Performed by: INTERNAL MEDICINE

## 2022-07-20 PROCEDURE — C1880 VENA CAVA FILTER: HCPCS

## 2022-07-20 PROCEDURE — 37191 INS ENDOVAS VENA CAVA FILTR: CPT

## 2022-07-20 PROCEDURE — 36415 COLL VENOUS BLD VENIPUNCTURE: CPT

## 2022-07-20 PROCEDURE — 93010 ELECTROCARDIOGRAM REPORT: CPT | Performed by: INTERNAL MEDICINE

## 2022-07-20 PROCEDURE — 6370000000 HC RX 637 (ALT 250 FOR IP): Performed by: HOSPITALIST

## 2022-07-20 PROCEDURE — 99232 SBSQ HOSP IP/OBS MODERATE 35: CPT | Performed by: INTERNAL MEDICINE

## 2022-07-20 PROCEDURE — 2580000003 HC RX 258: Performed by: INTERNAL MEDICINE

## 2022-07-20 PROCEDURE — 37191 INS ENDOVAS VENA CAVA FILTR: CPT | Performed by: SURGERY

## 2022-07-20 PROCEDURE — 85520 HEPARIN ASSAY: CPT

## 2022-07-20 PROCEDURE — 86304 IMMUNOASSAY TUMOR CA 125: CPT

## 2022-07-20 PROCEDURE — 06H03DZ INSERTION OF INTRALUMINAL DEVICE INTO INFERIOR VENA CAVA, PERCUTANEOUS APPROACH: ICD-10-PCS | Performed by: SURGERY

## 2022-07-20 PROCEDURE — 94640 AIRWAY INHALATION TREATMENT: CPT

## 2022-07-20 PROCEDURE — 2500000003 HC RX 250 WO HCPCS

## 2022-07-20 PROCEDURE — 6370000000 HC RX 637 (ALT 250 FOR IP): Performed by: INTERNAL MEDICINE

## 2022-07-20 PROCEDURE — 80048 BASIC METABOLIC PNL TOTAL CA: CPT

## 2022-07-20 RX ORDER — HEPARIN SODIUM 1000 [USP'U]/ML
2600 INJECTION, SOLUTION INTRAVENOUS; SUBCUTANEOUS ONCE
Status: COMPLETED | OUTPATIENT
Start: 2022-07-21 | End: 2022-07-20

## 2022-07-20 RX ORDER — HEPARIN SODIUM 1000 [USP'U]/ML
2600 INJECTION, SOLUTION INTRAVENOUS; SUBCUTANEOUS ONCE
Status: COMPLETED | OUTPATIENT
Start: 2022-07-20 | End: 2022-07-20

## 2022-07-20 RX ORDER — SODIUM CHLORIDE 9 MG/ML
INJECTION, SOLUTION INTRAVENOUS CONTINUOUS
Status: DISCONTINUED | OUTPATIENT
Start: 2022-07-20 | End: 2022-07-23

## 2022-07-20 RX ORDER — ALBUTEROL SULFATE 2.5 MG/3ML
2.5 SOLUTION RESPIRATORY (INHALATION) EVERY 6 HOURS PRN
Status: DISCONTINUED | OUTPATIENT
Start: 2022-07-20 | End: 2022-07-29 | Stop reason: HOSPADM

## 2022-07-20 RX ADMIN — HEPARIN SODIUM 2600 UNITS: 1000 INJECTION INTRAVENOUS; SUBCUTANEOUS at 23:41

## 2022-07-20 RX ADMIN — MIDODRINE HYDROCHLORIDE 10 MG: 5 TABLET ORAL at 11:59

## 2022-07-20 RX ADMIN — HEPARIN SODIUM 2600 UNITS: 1000 INJECTION INTRAVENOUS; SUBCUTANEOUS at 06:44

## 2022-07-20 RX ADMIN — SODIUM CHLORIDE: 9 INJECTION, SOLUTION INTRAVENOUS at 11:56

## 2022-07-20 RX ADMIN — FLUTICASONE PROPIONATE 1 SPRAY: 50 SPRAY, METERED NASAL at 08:39

## 2022-07-20 RX ADMIN — MIDODRINE HYDROCHLORIDE 10 MG: 5 TABLET ORAL at 17:54

## 2022-07-20 RX ADMIN — MIDODRINE HYDROCHLORIDE 10 MG: 5 TABLET ORAL at 08:38

## 2022-07-20 RX ADMIN — MORPHINE SULFATE 15 MG: 15 TABLET ORAL at 19:25

## 2022-07-20 RX ADMIN — ASPIRIN 81 MG: 81 TABLET, CHEWABLE ORAL at 19:25

## 2022-07-20 RX ADMIN — SODIUM CHLORIDE, PRESERVATIVE FREE 10 ML: 5 INJECTION INTRAVENOUS at 19:37

## 2022-07-20 RX ADMIN — ATORVASTATIN CALCIUM 10 MG: 10 TABLET, FILM COATED ORAL at 19:25

## 2022-07-20 RX ADMIN — SERTRALINE 25 MG: 50 TABLET, FILM COATED ORAL at 15:32

## 2022-07-20 RX ADMIN — HEPARIN SODIUM 1310 UNITS/HR: 10000 INJECTION, SOLUTION INTRAVENOUS at 06:25

## 2022-07-20 RX ADMIN — PANTOPRAZOLE SODIUM 40 MG: 40 TABLET, DELAYED RELEASE ORAL at 06:25

## 2022-07-20 RX ADMIN — SODIUM ZIRCONIUM CYCLOSILICATE 5 G: 5 POWDER, FOR SUSPENSION ORAL at 08:37

## 2022-07-20 RX ADMIN — MORPHINE SULFATE 15 MG: 15 TABLET ORAL at 08:49

## 2022-07-20 RX ADMIN — ALBUTEROL SULFATE 2.5 MG: 2.5 SOLUTION RESPIRATORY (INHALATION) at 20:04

## 2022-07-20 RX ADMIN — POLYETHYLENE GLYCOL 3350 17 G: 17 POWDER, FOR SOLUTION ORAL at 15:32

## 2022-07-20 RX ADMIN — Medication 1 TABLET: at 15:31

## 2022-07-20 ASSESSMENT — PAIN DESCRIPTION - LOCATION
LOCATION: ABDOMEN
LOCATION: ABDOMEN

## 2022-07-20 ASSESSMENT — PAIN SCALES - GENERAL
PAINLEVEL_OUTOF10: 4
PAINLEVEL_OUTOF10: 2
PAINLEVEL_OUTOF10: 7
PAINLEVEL_OUTOF10: 5

## 2022-07-20 ASSESSMENT — PAIN DESCRIPTION - ORIENTATION: ORIENTATION: LOWER

## 2022-07-20 NOTE — PLAN OF CARE
Problem: Discharge Planning  Goal: Discharge to home or other facility with appropriate resources  Outcome: Progressing  Flowsheets  Taken 7/20/2022 1539  Discharge to home or other facility with appropriate resources:   Identify barriers to discharge with patient and caregiver   Identify discharge learning needs (meds, wound care, etc)   Refer to discharge planning if patient needs post-hospital services based on physician order or complex needs related to functional status, cognitive ability or social support system   Arrange for interpreters to assist at discharge as needed   Arrange for needed discharge resources and transportation as appropriate  Taken 7/20/2022 1538  Discharge to home or other facility with appropriate resources:   Identify barriers to discharge with patient and caregiver   Identify discharge learning needs (meds, wound care, etc)   Refer to discharge planning if patient needs post-hospital services based on physician order or complex needs related to functional status, cognitive ability or social support system   Arrange for needed discharge resources and transportation as appropriate   Arrange for interpreters to assist at discharge as needed     Problem: Safety - Adult  Goal: Free from fall injury  Outcome: Progressing     Problem: Chronic Conditions and Co-morbidities  Goal: Patient's chronic conditions and co-morbidity symptoms are monitored and maintained or improved  Outcome: Progressing     Problem: Skin/Tissue Integrity  Goal: Absence of new skin breakdown  Description: 1. Monitor for areas of redness and/or skin breakdown  2. Assess vascular access sites hourly  3. Every 4-6 hours minimum:  Change oxygen saturation probe site  4. Every 4-6 hours:  If on nasal continuous positive airway pressure, respiratory therapy assess nares and determine need for appliance change or resting period.   Outcome: Progressing

## 2022-07-20 NOTE — PROGRESS NOTES
Pulmonary & Critical Care   Consult Note        Patient Name:  Bobbi Delgado ADMISSION/CC: Pulmonary embolism     PCP: Octaviano Sung MD    HISTORY OF PRESENT ILLNESS:     Feeling better less short of breath and oxygen down to 3 L she has no chest pain abdominal pain is better    Past Medical History:   Diagnosis Date    Anxiety     Ascites     DVT (deep venous thrombosis) (HCC)     Hypertension     Ovarian ca ([facility] [address] 07/10/2022    mets    Pulmonary embolism ([facility] [address] 2022    bilateral       Past Surgical History:   Procedure Laterality Date    COLONOSCOPY  14    colon polyp removed, diverticulosis       family history includes Breast Cancer in her maternal aunt, maternal cousin, and sister; Cancer in her mother; Diabetes in her mother; High Blood Pressure in her mother. Social History     Tobacco Use    Smoking status: Former     Packs/day: 0.50     Years: 20.00     Pack years: 10.00     Types: Cigarettes     Quit date: 5/3/1996     Years since quittin.2    Smokeless tobacco: Never   Substance Use Topics    Alcohol use:  Yes     Alcohol/week: 2.0 standard drinks     Types: 2 Cans of beer per week        Meds:    Current Facility-Administered Medications:     heparin (porcine) injection 5,200 Units, 5,200 Units, IntraVENous, PRN, Shakira Marino MD    heparin (porcine) injection 2,600 Units, 2,600 Units, IntraVENous, PRN, Shakira Marino MD    heparin 25,000 units in dextrose 5% 250 mL (premix) infusion, 1,440 Units/hr, IntraVENous, Continuous, Sacha Cadena MD, Last Rate: 14.4 mL/hr at 22 0643, 1,440 Units/hr at 22 4527    aspirin chewable tablet 81 mg, 81 mg, Oral, Daily, Shakira Marino MD, 81 mg at 22    Coenzyme Q10 CAPS 10 mg (Patient Supplied), 10 mg, Oral, Daily, Shakira Marino MD    fluticasone [facility]) 50 MCG/ACT nasal spray 1 spray, 1 spray, Each Nostril, Daily, Shakira Marino MD, 1 spray at 22 0839    atorvastatin (LIPITOR) tablet 10 mg, 10 mg, Oral, Daily, Mario Amanda MD, 10 mg at 07/19/22 2034    morphine (MSIR) tablet 15 mg, 15 mg, Oral, Q4H PRN, Mario Amanda MD, 15 mg at 07/20/22 0849    therapeutic multivitamin-minerals 1 tablet, 1 tablet, Oral, Daily, Mario Amanda MD    pantoprazole (PROTONIX) tablet 40 mg, 40 mg, Oral, QAM AC, Mario Amanda MD, 40 mg at 07/20/22 5136    sennosides-docusate sodium (SENOKOT-S) 8.6-50 MG tablet 2 tablet, 2 tablet, Oral, Daily, Mario Amanda MD    sertraline (ZOLOFT) tablet 25 mg, 25 mg, Oral, Daily, Mario Amanda MD, 25 mg at 07/19/22 2034    sodium chloride flush 0.9 % injection 5-40 mL, 5-40 mL, IntraVENous, 2 times per day, Mario Amanda MD, 10 mL at 07/19/22 2034    sodium chloride flush 0.9 % injection 5-40 mL, 5-40 mL, IntraVENous, PRN, Mario Amanda MD    0.9 % sodium chloride infusion, , IntraVENous, PRN, Mario Amanda MD    ondansetron (ZOFRAN-ODT) disintegrating tablet 4 mg, 4 mg, Oral, Q8H PRN **OR** ondansetron (ZOFRAN) injection 4 mg, 4 mg, IntraVENous, Q6H PRN, Mario Amanda MD    acetaminophen (TYLENOL) tablet 650 mg, 650 mg, Oral, Q6H PRN **OR** acetaminophen (TYLENOL) suppository 650 mg, 650 mg, Rectal, Q6H PRN, Mario Amanda MD    perflutren lipid microspheres (DEFINITY) injection 1.65 mg, 1.5 mL, IntraVENous, ONCE PRN, Mario Amanda MD    [facility]) tablet 8.6 mg, 1 tablet, Oral, BID PRN, Mario Amanda MD    midodrine (PROAMATINE) tablet 10 mg, 10 mg, Oral, TID WC, Yuridia Schneider MD, 10 mg at 07/20/22 9896    polyethylene glycol (GLYCOLAX) packet 17 g, 17 g, Oral, Daily, Mario Amanda MD     Continuous Infusions:   heparin (PORCINE) Infusion 1,440 Units/hr (07/20/22 0643)    sodium chloride         PRN Meds:  heparin (porcine), heparin (porcine), morphine, sodium chloride flush, sodium chloride, ondansetron **OR** ondansetron, acetaminophen **OR** acetaminophen, perflutren lipid microspheres, senna    Allergies:  Patient has No Known Allergies. REVIEW OF SYSTEMS:  Review of Systems  All other systems have been reviewed and have been negative except what stated above in HPI  I personally reviewed the patient's medication list, medical and surgical history, and updated as needed. Objective:   EXAM:  BP 94/69   Pulse 76   Temp 98.1 °F (36.7 °C) (Oral)   Resp 20   Ht 5' 4\" (1.626 m)   Wt 208 lb (94.3 kg)   LMP  (LMP Unknown)   SpO2 93%   BMI 35.70 kg/m²    Physical Exam     General appearance: Appears slightly anxious. No apparent distress, appears stated age and cooperative. HEENT:  Normal cephalic, atraumatic without obvious deformity. Pupils equal, round, and reactive to light. Extra ocular muscles intact. Conjunctivae/corneas clear. Neck: Supple, with full range of motion. No jugular venous distention. Trachea midline. Respiratory:  Normal respiratory effort. Clear to auscultation, bilaterally without Rales/Wheezes/Rhonchi. [address] air entry. Cardiovascular: Tachycardic. Regular rhythm with normal S1/S2 without murmurs, rubs or gallops. Abdomen: Abdomen distended and tense. Dullness in the flanks bilaterally. Positive bowel sounds noted. Musculoskeletal: Edema in left lower leg. Skin: Skin color, texture, turgor normal.  No rashes or lesions. Neurologic:  Neurovascularly intact without any focal sensory/motor deficits.  Cranial nerves: II-XII intact, grossly non-focal.  Psychiatric:  Alert and oriented, thought content appropriate, normal insight  Capillary Refill: Brisk,3 seconds, normal  Peripheral Pulses: +2 palpable, equal bilaterally  Data Reviewed:   LABS:  :   Recent Labs     07/19/22  0740 07/20/22 0518   WBC 17.3* 10.9   HGB 11.4* 10.6*   HCT 35.3* 31.5*   MCV 88.6 87.4    232       BMP:   Recent Labs     07/19/22  0740 07/20/22 0518   * 130*   K 5.1 5.2*   CL 91* 96*   CO2 21 20*   BUN 29* 33*   CREATININE 1. 8* 1.0       PROFILE:   Recent Labs     07/19/22  0740 07/20/22  0518   AST 75* 83*   ALT 27 43*   BILIDIR  --  <0.2   BILITOT 0.5 0.4   ALKPHOS 58 54       PT/INR:   Recent Labs     07/19/22  0740   PROTIME 15.6*   INR 1.26*       APTT:  Recent Labs     07/19/22  0740   APTT 33.8       :  Recent Labs     07/19/22  1452   COLORU Yellow   PHUR 6.0   WBCUA 3-5   RBCUA 5-10*   BACTERIA Rare*   CLARITYU Clear   SPECGRAV 1.010   LEUKOCYTESUR Negative   UROBILINOGEN 0.2   BILIRUBINUR Negative   BLOODU Negative   GLUCOSEU Negative   AMORPHOUS 1+       No results for input(s): PHART, FFG1MDX, PO2ART in the last 72 hours. Plan:     Submassive pulmonary embolism provoked by underlying ovarian cancer she is currently on heparin need to be transitioned to oral anticoagulation NOAC upon discharge  for  extended period of time   suggest Eliquis 10 mg twice daily for a week then 5 mg twice daily  There is evidence of RV strain on CT scan with the patient is not a candidate for any kind of intervention        Acute hypoxic respiratory failure she was on 6 L that was weaned down to 3 L keep weaning oxygen to keep her pulse oximetry more than 88%. Metastatic ovarian cancer oncology to follow    Other issues are managed by other consulting services and the primary service.     Thanks for the consultation    Eliseo Sal MD  July 20, 2022  11:09 AM

## 2022-07-20 NOTE — CARE COORDINATION
CASE MANAGEMENT INITIAL ASSESSMENT      Reviewed chart and completed assessment with patient:   Family present: None  Explained Case Management role/services. To patient    Primary contact information:see below    Health Care Decision Maker :   Primary Decision Maker: Dahlia Barclay - Brother/Sister - [phone]    Primary Decision Maker: Yomi Manning - Brother/Sister - [phone]          Can this person be reached and be able to respond quickly, such as within a few minutes or hours? Yes      Admit date/status:Inpatient 7/19/2022  Diagnosis:Elevated troponin  Is this a Readmission?:  No      Insurance: Medicare  Precert required for SNF: No       3 night stay required: No    Living arrangements, Adls, care needs, prior to admission:Lives home alone and [address] at home:  Walker__Cane__RTS__ BSC__Shower Chair__  02__ HHN__ CPAP__  BiPap__  Hospital Bed__ W/C___ Other_____    Services in the home and/or outpatient, prior to admission:None    Current Marlin Escalante                                Medications:No barriers to getting medications reported Prescription coverage? Yes Will pt require financial assistance with medications No     Transportation needs: TBD       PT/OT recs:N/A ambulatory    Hospital Exemption Notification (HEN):N/A    Barriers to discharge: None identified     Plan/comments: To return home with family support . IPTA. Open to services if she were to need them but anticipates no needs. Patient has ovarian cancer. Hem/onc consulted. Vascular surgery consulted for IVC filter placement. Heparin gtt. Positive for DVT on lower extremity dopplers. Home in 2 to 3 days. Will follow.      ECOC on chart for MD signature

## 2022-07-20 NOTE — CONSULTS
Nephrology Consult Note                                                                                                                                                                                                                                                                                                                                                               Office : [phone]     Fax :[phone]              Patient's Name: Jefe Moreau  11:45 AM  7/20/2022    Reason for Consult: CHICHI    Requesting Physician:  Martha Grant MD      Chief Complaint:      History of Present Ilness:    Jefe Moreau is a 79 y.o. female with PMH of HTN , recent diagnosis of probable ovarian Ca    C/w SOB    CT chest with contrast s/o PE BL    Nephrology consult for CHICHI management      Poor po intake of fluids +  Loose stools on Miralax +  NSAIDs +      Past Medical History:   Diagnosis Date    Anxiety     Ascites     DVT (deep venous thrombosis) (HCC)     Hypertension     Ovarian ca ([facility] [address] 07/10/2022    mets    Pulmonary embolism ([facility] [address] 07/19/2022    bilateral       Past Surgical History:   Procedure Laterality Date    COLONOSCOPY  9-8-14    colon polyp removed, diverticulosis       Family History   Problem Relation Age of Onset    Cancer Mother         Pancreatic Cancer    Diabetes Mother     High Blood Pressure Mother     Breast Cancer Sister     Breast Cancer Maternal Aunt     Breast Cancer Maternal Cousin         reports that she quit smoking about 26 years ago. Her smoking use included cigarettes. She has a 10.00 pack-year smoking history. She has never used smokeless tobacco. She reports current alcohol use of about 2.0 standard drinks per week. She reports that she does not use drugs. Allergies:  Patient has no known allergies.     Current Medications:    heparin (porcine) injection 5,200 Units, PRN  heparin (porcine) injection 2,600 Units, PRN  heparin 25,000 units in dextrose 5% 250 mL (premix) infusion, Continuous  aspirin chewable tablet 81 mg, Daily  Coenzyme Q10 CAPS 10 mg (Patient Supplied), Daily  fluticasone (FLONASE) 50 MCG/ACT nasal spray 1 spray, Daily  atorvastatin (LIPITOR) tablet 10 mg, Daily  morphine (MSIR) tablet 15 mg, Q4H PRN  therapeutic multivitamin-minerals 1 tablet, Daily  pantoprazole (PROTONIX) tablet 40 mg, QAM AC  sennosides-docusate sodium (SENOKOT-S) 8.6-50 MG tablet 2 tablet, Daily  sertraline (ZOLOFT) tablet 25 mg, Daily  sodium chloride flush 0.9 % injection 5-40 mL, 2 times per day  sodium chloride flush 0.9 % injection 5-40 mL, PRN  0.9 % sodium chloride infusion, PRN  ondansetron (ZOFRAN-ODT) disintegrating tablet 4 mg, Q8H PRN   Or  ondansetron (ZOFRAN) injection 4 mg, Q6H PRN  acetaminophen (TYLENOL) tablet 650 mg, Q6H PRN   Or  acetaminophen (TYLENOL) suppository 650 mg, Q6H PRN  perflutren lipid microspheres (DEFINITY) injection 1.65 mg, ONCE PRN  senna (SENOKOT) tablet 8.6 mg, BID PRN  midodrine (PROAMATINE) tablet 10 mg, TID WC  polyethylene glycol (GLYCOLAX) packet 17 g, Daily        Review of Systems:   14 point ROS obtained but were negative except mentioned in HPI      Physical exam:     Vitals:  BP 94/69   Pulse 76   Temp 98.1 °F (36.7 °C) (Oral)   Resp 20   Ht 5' 4\" (1.626 m)   Wt 208 lb (94.3 kg)   LMP  (LMP Unknown)   SpO2 93%   BMI 35.70 kg/m²   Constitutional:  OAA X3 NAD  Skin: no rash, turgor wnl  Heent:  eomi, mmm  Neck: no bruits or jvd noted  Cardiovascular:  S1, S2 without m/r/g  Respiratory: CTA B without w/r/r  Abdomen:  +bs, soft, nt, nd  Ext:  no lower extremity edema  Psychiatric: mood and affect appropriate  Musculoskeletal:  Rom, muscular strength intact    Data:   Labs:  CBC:   Recent Labs     07/19/22  0740 07/20/22  0518   WBC 17.3* 10.9   HGB 11.4* 10.6*    232     BMP:    Recent Labs     07/19/22  0740 07/20/22  0518   * 130*   K 5.1 5.2*   CL 91* 96*   CO2 21 20*   BUN 29* 33*   CREATININE 1.8* 1.0   GLUCOSE 224* 126* Ca/Mg/Phos:   Recent Labs     07/19/22  0740 07/20/22  0518   CALCIUM 9.9 10.0     Hepatic:   Recent Labs     07/19/22  0740 07/20/22  0518   AST 75* 83*   ALT 27 43*   BILITOT 0.5 0.4   ALKPHOS 58 54     Troponin:   Recent Labs     07/19/22  0740 07/19/22  1603 07/19/22  2140   TROPONINI 0.54* 0.29* 0.27*     BNP: No results for input(s): BNP in the last 72 hours. Lipids: No results for input(s): CHOL, TRIG, HDL, LDLCALC, LABVLDL in the last 72 hours. ABGs: No results for input(s): PHART, PO2ART, FFF2TZV in the last 72 hours. INR:   Recent Labs     07/19/22  0740   INR 1.26*     UA:  Recent Labs     07/19/22  1452   COLORU Yellow   CLARITYU Clear   GLUCOSEU Negative   BILIRUBINUR Negative   KETUA Negative   SPECGRAV 1.010   BLOODU Negative   PHUR 6.0   PROTEINU TRACE*   UROBILINOGEN 0.2   NITRU Negative   LEUKOCYTESUR Negative   LABMICR YES   Dayanna Phylicia NotGiven      Urine Microscopic:   Recent Labs     07/19/22  1452   BACTERIA Rare*   WBCUA 3-5   RBCUA 5-10*   EPIU 2-5     Urine Culture: No results for input(s): LABURIN in the last 72 hours. Urine Chemistry:   Recent Labs     07/19/22  1452   LABCREA 159.8   NAUR <20             IMAGING:  VL Extremity Venous Bilateral         CT CHEST PULMONARY EMBOLISM W CONTRAST   Final Result   Chest:      Bilateral pulmonary emboli, left greater than right, with bilateral pleural   effusions, left greater than right and adjacent consolidation of the lungs,   likely atelectasis. There is mild dilatation of the right heart, IVC and   hepatic veins suggesting a component of right heart insufficiency. Results   discussed with [address] by Tamiko Chacon. Enrique Sen MD at 8:52 Am on   7/19/2022      Abdomen and pelvis:      Abdominal and pelvic ascites with findings of peritoneal carcinomatosis,   presumably due to ovarian carcinoma due to the nodular and irregular   appearance of the ovaries.          CT ABDOMEN PELVIS W IV CONTRAST Additional Contrast? None   Final Result Chest:      Bilateral pulmonary emboli, left greater than right, with bilateral pleural   effusions, left greater than right and adjacent consolidation of the lungs,   likely atelectasis. There is mild dilatation of the right heart, IVC and   hepatic veins suggesting a component of right heart insufficiency. Results   discussed with [address] by Arash Kaufman. Sunita Sutton MD at 8:52 Am on   7/19/2022      Abdomen and pelvis:      Abdominal and pelvic ascites with findings of peritoneal carcinomatosis,   presumably due to ovarian carcinoma due to the nodular and irregular   appearance of the ovaries. XR CHEST PORTABLE   Final Result   No acute process.       Bibasilar hypoaeration             Assessment/Plan     Acute renal failure on CKD 3a    Etiology could be RAAS blockade from ARB , intravascular volume depletion from poor po intake , NSAIDs exposure     Component of ATN from hypotension     UA : protein trace    Urine Na < 20     Plan       Hold ACEI or ARB    CHICHI Improving     Gentle IV hydration with NS    Avoid NSAIDs  Avoid contrast  Avoid hypotension   Keep MAP > 65 mmhg    Continue Midodrine TID      Hyponatremia :    127 ----> 130    Give NS    [place]      Hyperkalemia     Low K diet    Lokelma 5 gram x 1 dose       PE bilateral    On heparin drip      Probable ovarian ca    Management per oncology       HTN    Hold BP meds    Continue Midodrine              Thank you for allowing us to participate in care of Michael Paulino MD  Feel free to contact me   [facility] [address]  Office : [phone]  Fax :[phone]

## 2022-07-20 NOTE — CONSULTS
Vascular Surgery Consultation    Date of Admission:  7/19/2022  7:09 AM  Date of Consultation:  7/20/2022    PCP:  Marybel Infante MD       Reason for Consultation:  IVC filter    History of Present Illness: We are asked to see this patient in consultation by Dr Ely Weaver regarding IVC filter placement. Jessie Villegas is a 79 y.o. female who was admitted with acute PE. She has a recent presumed diagnosis of ovarian CA with malignant ascites. She was evaluated on admission and felt not to be a candidate for lysis or PA thrombectomy. She has been on a Heparin drip since admission and has noted decreasing SOB. She apparently is going to need a liver bx, and perhaps paracentesis. As anticoagulation will need to be held for invasive procedures, Oncology has requested an IVC filter be placed. Patient currently without CP but does continue to have SOB but decreased since admission. Past Medical History:  Past Medical History:   Diagnosis Date    Anxiety     Ascites     DVT (deep venous thrombosis) (HCC)     Hypertension     Ovarian ca ([facility] [address] 07/10/2022    mets    Pulmonary embolism ([facility] [address] 07/19/2022    bilateral       Past Surgical History:  Past Surgical History:   Procedure Laterality Date    COLONOSCOPY  9-8-14    colon polyp removed, diverticulosis       Home Medications:   Prior to Admission medications    Medication Sig Start Date End Date Taking? Authorizing Provider   morphine (MSIR) 15 MG tablet Take 1 tablet by mouth every 4 hours as needed for Pain for up to 30 days.  7/14/22 8/13/22  Ale Almaguer MD   omeprazole (PRILOSEC) 20 MG delayed release capsule Take 1 capsule by mouth every morning (before breakfast)  Patient not taking: Reported on 7/19/2022 7/14/22   Ale Almaguer MD   sennosides-docusate sodium (SENOKOT-S) 8.6-50 MG tablet Take 2 tablets by mouth daily  Patient not taking: Reported on 7/19/2022 7/14/22   Ale Almaguer MD   polyethylene glycol (GLYCOLAX) 17 GM/SCOOP powder Take 17 g by mouth daily 7/10/22 8/9/22  Naseem Sheppard MD   lovastatin (MEVACOR) 40 MG tablet TAKE ONE TAB BY MOUTH ONCE NIGHTLY 6/6/22   JENI Stephenson   losartan (COZAAR) 100 MG tablet TAKE ONE TAB BY MOUTH DAILY 6/6/22   JENI Stephenson   fluticasone (FLONASE) 50 MCG/ACT nasal spray SPRAY ONE SPRAY IN EACH NOSTRIL ONCE DAILY 4/11/22   JENI Stephenson   Multiple Vitamin (MULTIVITAMINS PO) Take by mouth  Patient not taking: Reported on 7/19/2022    Historical Provider, MD   sertraline (ZOLOFT) 50 MG tablet TAKE ONE TABLET BY MOUTH DAILY  Patient taking differently: 25 mg TAKE ONE TABLET BY MOUTH DAILY 1/20/22   Dilip Garcia MD   ibuprofen (ADVIL;MOTRIN) 800 MG tablet TAKE 1 TABLET EVERY 6 HOURSAS NEEDED 5/10/21   Dilip Garcia MD   aspirin 81 MG chewable tablet Take 81 mg by mouth    Historical Provider, MD   Coenzyme Q10 10 MG CAPS Take 10 mg by mouth    Historical Provider, MD   Calcium Carb-Cholecalciferol (CALCIUM CARBONATE-VITAMIN D3 PO) Take by mouth    Historical Provider, MD        Facility Administered Medications:    aspirin  81 mg Oral Daily    Coenzyme Q10  10 mg Oral Daily    fluticasone  1 spray Each Nostril Daily    atorvastatin  10 mg Oral Daily    therapeutic multivitamin-minerals  1 tablet Oral Daily    pantoprazole  40 mg Oral QAM AC    sennosides-docusate sodium  2 tablet Oral Daily    sertraline  25 mg Oral Daily    sodium chloride flush  5-40 mL IntraVENous 2 times per day    midodrine  10 mg Oral TID WC    polyethylene glycol  17 g Oral Daily       Allergies:  Patient has no known allergies.      Social History:      Social History     Socioeconomic History    Marital status: Single     Spouse name: Not on file    Number of children: Not on file    Years of education: 12    Highest education level: Not on file   Occupational History    Occupation: retired   Tobacco Use    Smoking status: Former     Packs/day: 0.50     Years: 20.00     Pack years: 10.00 Types: Cigarettes     Quit date: 5/3/1996     Years since quittin.2    Smokeless tobacco: Never   Substance and Sexual Activity    Alcohol use: Yes     Alcohol/week: 2.0 standard drinks     Types: 2 Cans of beer per week    Drug use: No    Sexual activity: Not Currently     Partners: Male   Other Topics Concern    Not on file   Social History Narrative    Not on file     Social Determinants of Health     Financial Resource Strain: Low Risk     Difficulty of Paying Living Expenses: Not hard at all   Food Insecurity: No Food Insecurity    Worried About [employer] [employer] in the Last Year: Never true    [address] [employer] [address] in the Last Year: Never true   Transportation Needs: No Transportation Needs    Lack of Transportation (Medical): No    Lack of Transportation (Non-Medical): No   Physical Activity: Inactive    Days of Exercise per Week: 0 days    Minutes of Exercise per Session: 0 min   Stress: Not on file   Social Connections: Not on file   Intimate Partner Violence: Not on file   Housing Stability: Low Risk     Unable to Pay for Housing in the Last Year: No    Number of Places Lived in the Last Year: 1    Unstable Housing in the Last Year: No       Family History:        Problem Relation Age of Onset    Cancer Mother         Pancreatic Cancer    Diabetes Mother     High Blood Pressure Mother     Breast Cancer Sister     Breast Cancer Maternal Aunt     Breast Cancer Maternal Cousin        Review of Systems:  A 14 point review of systems was completed. Pertinent positives identified in the HPI, all other review of systems negative. Physical Examination:    /64   Pulse 88   Temp 98.4 °F (36.9 °C) (Oral)   Resp 18   Ht 5' 4\" (1.626 m)   Wt 208 lb (94.3 kg)   LMP  (LMP Unknown)   SpO2 94%   BMI 35.70 kg/m²        Admission Weight: 179 lb (81.2 kg)       General appearance: NAD  Eyes: PERRLA  Neck: no JVD, no lymphadenopathy.   Respiratory: effort is unlabored, no crackles, wheezes or rubs.  Cardiovascular: regular, no murmur. No carotid bruits. Pulses:    femoral   RIGHT 2   LEFT 2   GI: abdomen soft, nondistended, no organomegaly. Musculoskeletal: strength and tone normal.  Extremities: warm and pink. Skin: no dermatitis or ulceration. Neuro/psychiatric: grossly intact. ASA 4 - Patient with severe systemic disease that is a constant threat to life    Mallampati Airway Assessment:  Mallampati Class II - (soft palate, fauces & uvula are visible)       MEDICAL DECISION MAKING/TESTING    Venous Duplex:    Right   Acute partially to totally occluding superficial venous thrombosis involving   the right greater saphenous vein from proximal thigh to mid calf (tip of   thrombus measures 1.15 cm from the SFJ, total length of thrombus is   approximately 33 cm). Acute totally occluding superficial venous thrombosis involving a tributary   off the greater saphenous vein located at mid thigh. No other evidence of deep vein or superficial vein thrombosis involving the   right lower extremity. Incidental finding of pulsatile venous flow noted in the common femoral   artery. Left   Acute totally occluding deep vein thrombosis involving the left distal   femoral, popliteal, gastroc (2 of 2), posterior tibial (2 of2), peroneal (2 of   2) and soleal veins. No other evidence of deep vein or superficial vein thrombosis involving the   left lower extremity. Incidental finding of pulsatile venous flow most commonly associated with   fluid volume overload.          Labs:   CBC:   Recent Labs     07/19/22  0740 07/20/22  0518   WBC 17.3* 10.9   HGB 11.4* 10.6*   HCT 35.3* 31.5*   MCV 88.6 87.4    232     BMP:   Recent Labs     07/19/22  0740 07/20/22  0518   * 130*   K 5.1 5.2*   CL 91* 96*   CO2 21 20*   BUN 29* 33*   CREATININE 1.8* 1.0   CALCIUM 9.9 10.0     Cardiac Enzymes:   Recent Labs     07/19/22  0740 07/19/22  1603 07/19/22  2140   TROPONINI 0.54* 0.29* 0.27*     PT/INR: Recent Labs     07/19/22  0740   PROTIME 15.6*   INR 1.26*     APTT:   Recent Labs     07/19/22  0740   APTT 33.8     Liver Profile:  Lab Results   Component Value Date/Time    AST 83 07/20/2022 05:18 AM    ALT 43 07/20/2022 05:18 AM    BILIDIR <0.2 07/20/2022 05:18 AM    BILITOT 0.4 07/20/2022 05:18 AM    ALKPHOS 54 07/20/2022 05:18 AM     Lab Results   Component Value Date/Time    CHOL 182 03/28/2022 08:47 AM    HDL 45 03/28/2022 08:47 AM    TRIG 172 03/28/2022 08:47 AM     TSH:  Lab Results   Component Value Date/Time    TSH 1.56 07/26/2017 11:10 AM     UA:   Lab Results   Component Value Date/Time    NITRITE neg 07/26/2017 12:00 AM    COLORU Yellow 07/19/2022 02:52 PM    PHUR 6.0 07/19/2022 02:52 PM    WBCUA 3-5 07/19/2022 02:52 PM    RBCUA 5-10 07/19/2022 02:52 PM    BACTERIA Rare 07/19/2022 02:52 PM    CLARITYU Clear 07/19/2022 02:52 PM    SPECGRAV 1.010 07/19/2022 02:52 PM    LEUKOCYTESUR Negative 07/19/2022 02:52 PM    UROBILINOGEN 0.2 07/19/2022 02:52 PM    BILIRUBINUR Negative 07/19/2022 02:52 PM    BILIRUBINUR neg 07/26/2017 12:00 AM    BLOODU Negative 07/19/2022 02:52 PM    GLUCOSEU Negative 07/19/2022 02:52 PM    AMORPHOUS 1+ 07/19/2022 02:52 PM         Assessment:  Acute DVT/PE   Probable Ovarian CA    Plan: IVC filter will be placed later today. Procedure, risks, benefits, and alternatives explained and understood.

## 2022-07-20 NOTE — PROGRESS NOTES
Hospitalist Progress Note      PCP: Raheel Strong MD    Date of Admission: 7/19/2022    Chief Complaint:   Abd discomfort    Hospital Course:      79 y.o. female who presented to [facility] with increasing shortness of breath. Patient recently diagnosed with ovarian cancer on 7/10/2022. Patient had presented to the emergency department with increasing abdominal distention. CAT scan shows metastatic ovarian CA. Patient was scheduled to see Dr. Afsaneh Nath in [facility] tomorrow. Over the past week patient with progressive shortness of breath. She states yesterday it became severe. She denies history of COPD. Remote tobacco history. No oxygen use at home. Patient has been using morphine for pain control. Abdominal distention secondary to ascites per previous CT scan. Patient denies any swelling in her legs although she has had some discomfort in her upper thighs. Her last BM was this morning no blood or melena noted. Subjective:   Patient with some abd pressure due to ascites. No emesis. Appetite is poor. Shortness of breath improved.      Medications:  Reviewed    Infusion Medications    sodium chloride 50 mL/hr at 07/20/22 1156    heparin (PORCINE) Infusion Stopped (07/20/22 1257)    sodium chloride       Scheduled Medications    aspirin  81 mg Oral Daily    Coenzyme Q10  10 mg Oral Daily    fluticasone  1 spray Each Nostril Daily    atorvastatin  10 mg Oral Daily    therapeutic multivitamin-minerals  1 tablet Oral Daily    pantoprazole  40 mg Oral QAM AC    sennosides-docusate sodium  2 tablet Oral Daily    sertraline  25 mg Oral Daily    sodium chloride flush  5-40 mL IntraVENous 2 times per day    midodrine  10 mg Oral TID WC    polyethylene glycol  17 g Oral Daily     PRN Meds: heparin (porcine), heparin (porcine), morphine, sodium chloride flush, sodium chloride, ondansetron **OR** ondansetron, acetaminophen **OR** acetaminophen, perflutren lipid microspheres, senna      Intake/Output Summary (Last 24 hours) at 7/20/2022 1451  Last data filed at 7/20/2022 0646  Gross per 24 hour   Intake 610.81 ml   Output 750 ml   Net -139.19 ml       Physical Exam Performed:    /74   Pulse 82   Temp 97.8 °F (36.6 °C) (Oral)   Resp 18   Ht 5' 4\" (1.626 m)   Wt 208 lb (94.3 kg)   LMP  (LMP Unknown)   SpO2 96%   BMI 35.70 kg/m²     General appearance: Mild discomfort, appears stated age and cooperative. HEENT: Pupils equal, round, and reactive to light. Conjunctivae/corneas clear. Neck: Supple, with full range of motion. No jugular venous distention. Trachea midline. Respiratory:  Normal respiratory effort. Clear to auscultation, bilaterally without Rales/Wheezes/Rhonchi. Cardiovascular: Regular rate and rhythm with normal S1/S2 without murmurs, rubs or gallops. Abdomen: Distended but soft. Dull in flanks  Musculoskeletal: No clubbing, cyanosis. Edema bilaterally LE. Full range of motion without deformity. Skin: Skin color, texture, turgor normal.  No rashes or lesions. Neurologic:  Neurovascularly intact without any focal sensory/motor deficits.  Cranial nerves: II-XII intact, grossly non-focal.  Psychiatric: Alert and oriented, thought content appropriate, normal insight  Capillary Refill: Brisk,3 seconds, normal   Peripheral Pulses: +2 palpable, equal bilaterally       Labs:   Recent Labs     07/19/22 0740 07/20/22 0518   WBC 17.3* 10.9   HGB 11.4* 10.6*   HCT 35.3* 31.5*    232     Recent Labs     07/19/22  0740 07/20/22  0518   * 130*   K 5.1 5.2*   CL 91* 96*   CO2 21 20*   BUN 29* 33*   CREATININE 1.8* 1.0   CALCIUM 9.9 10.0     Recent Labs     07/19/22  0740 07/20/22  0518   AST 75* 83*   ALT 27 43*   BILIDIR  --  <0.2   BILITOT 0.5 0.4   ALKPHOS 58 54     Recent Labs     07/19/22  0740   INR 1.26*     Recent Labs     07/19/22  0740 07/19/22  1603 07/19/22  2140   TROPONINI 0.54* 0.29* 0.27*       Urinalysis:      Lab Results   Component Value Date/Time    NITRU Negative 07/19/2022 02:52 PM    WBCUA 3-5 07/19/2022 02:52 PM    BACTERIA Rare 07/19/2022 02:52 PM    RBCUA 5-10 07/19/2022 02:52 PM    BLOODU Negative 07/19/2022 02:52 PM    SPECGRAV 1.010 07/19/2022 02:52 PM    GLUCOSEU Negative 07/19/2022 02:52 PM       Radiology:  VL Extremity Venous Bilateral         CT CHEST PULMONARY EMBOLISM W CONTRAST   Final Result   Chest:      Bilateral pulmonary emboli, left greater than right, with bilateral pleural   effusions, left greater than right and adjacent consolidation of the lungs,   likely atelectasis. There is mild dilatation of the right heart, IVC and   hepatic veins suggesting a component of right heart insufficiency. Results   discussed with Cameron Cueto by Meri Sandhu. Migdalia Mccartney MD at 8:52 Am on   7/19/2022      Abdomen and pelvis:      Abdominal and pelvic ascites with findings of peritoneal carcinomatosis,   presumably due to ovarian carcinoma due to the nodular and irregular   appearance of the ovaries. CT ABDOMEN PELVIS W IV CONTRAST Additional Contrast? None   Final Result   Chest:      Bilateral pulmonary emboli, left greater than right, with bilateral pleural   effusions, left greater than right and adjacent consolidation of the lungs,   likely atelectasis. There is mild dilatation of the right heart, IVC and   hepatic veins suggesting a component of right heart insufficiency. Results   discussed with Cameron Cueto by Meri Sandhu. Migdalia Mccartney MD at 8:52 Am on   7/19/2022      Abdomen and pelvis:      Abdominal and pelvic ascites with findings of peritoneal carcinomatosis,   presumably due to ovarian carcinoma due to the nodular and irregular   appearance of the ovaries. XR CHEST PORTABLE   Final Result   No acute process.       Bibasilar hypoaeration                 Assessment/Plan:    Active Hospital Problems    Diagnosis     Acute deep vein thrombosis (DVT) of proximal vein of both lower extremities (HCC) [I82.4Y3]      Priority: Medium    Acute pulmonary embolism ([facility] [address] [I26.99]      Priority: Medium     1. Bilateral pulmonary emboli. Consulted pulmonary. Heme-onc will also be consulted. Patient placed on heparin drip. Will obtain echocardiogram.  Cardiology evaluated patient. Lower extremity Dopplers positive for DVT. Vascular surgery consulted for IVC filter placement. 2.  Elevated troponin. Trending down. Secondary to PE per cardiology. 3.  Ovarian cancer. Heme-onc following. Gyn/Onc consulted.  >[id number]  4. Hypertension. Patient's blood pressure was low. Will monitor. DVT Prophylaxis: Heparin drip  Diet: ADULT DIET; Regular; No Added Salt (3-4 gm)  Code Status: Full Code    PT/OT Eval Status: Pending    Dispo - Home when stable 2-3 days.     Estefany Miner MD

## 2022-07-20 NOTE — PROGRESS NOTES
Request for Consult- reason: presumed ovarian cancer    Received call asking for consult d/t presumed ovarian cancer. Reviewed chart patient was seen in ED last week for abd pain/distention and was said to be diagnosed with ovarian cancer with findings on CT abd/pelvis and was set up to soon follow with oncology. I called RN to notify that it will be more appropriate to consult Gynecologic Oncology vs benign GYN to evaluate and provide recommendation for further management given Ms [facility] findings and suspicion of ovarian cancer and possible metastasis. RN to place call out to Melvin Arias. Please call with additional questions.     Vesta Figueroa, [place]  [facility]PS[facility]  [phone]

## 2022-07-20 NOTE — CONSULTS
ONCOLOGY HEMATOLOGY CARE CONSULT NOTE      Requesting Physician:  Dr. Mike Wilson:  PE        HISTORY OF PRESENT ILLNESS:      Ms. Eliezer Marquez  is a 79 y.o. female we are seeing in consultation for a PE. This patient was seen in the ER on 7/10/2022 abdominal pain. A CT at that time showed a left-sided pelvic mass, ascites, mesenteric nodularity. I had been contacted and had recommended that the patient be seen by GYN onc. She was scheduled to see me today. She was admitted to [facility] on 7/19/2022 with bilateral PEs. Imaging at the time of admission showed bilateral PEs, abdominoperitoneal carcinomatosis, nodularity of the ovaries. At this point, she has not eaten breakfast.     She can eat very little.     Past Medical History:    Past Medical History:   Diagnosis Date    Anxiety     Ascites     DVT (deep venous thrombosis) (HCC)     Hypertension     Ovarian ca ([address] 07/10/2022    mets    Pulmonary embolism (HCC) 07/19/2022    bilateral     Past Surgical History:    Past Surgical History:   Procedure Laterality Date    COLONOSCOPY  9-8-14    colon polyp removed, diverticulosis       Current Medications:    Current Facility-Administered Medications   Medication Dose Route Frequency Provider Last Rate Last Admin    heparin (porcine) injection 2,600 Units  2,600 Units IntraVENous Once Adriel Jurado MD        heparin (porcine) injection 5,200 Units  5,200 Units IntraVENous PRN Andi Saleh MD        heparin (porcine) injection 2,600 Units  2,600 Units IntraVENous PRN Andi Saleh MD        heparin 25,000 units in dextrose 5% 250 mL (premix) infusion  1,440 Units/hr IntraVENous Continuous Adriel Jurado MD 13.1 mL/hr at 07/20/22 0625 1,310 Units/hr at 07/20/22 7647    aspirin chewable tablet 81 mg  81 mg Oral Daily Andi Saleh MD   81 mg at 07/19/22 2034    Coenzyme Q10 CAPS 10 mg (Patient Supplied)  10 mg Oral Daily Andi Saleh MD        fluticasone Lita Dixon) 50 MCG/ACT nasal spray 1 spray  1 spray Each Nostril Daily Gurpreet Ortiz MD        atorvastatin (LIPITOR) tablet 10 mg  10 mg Oral Daily Gurpreet Ortiz MD   10 mg at 07/19/22 2034    morphine (MSIR) tablet 15 mg  15 mg Oral Q4H PRN Gurpreet Ortiz MD   15 mg at 07/19/22 1904    therapeutic multivitamin-minerals 1 tablet  1 tablet Oral Daily Gurpreet Ortiz MD        pantoprazole (PROTONIX) tablet 40 mg  40 mg Oral QAM AC Gurpreet Ortiz MD   40 mg at 07/20/22 6076    sennosides-docusate sodium (SENOKOT-S) 8.6-50 MG tablet 2 tablet  2 tablet Oral Daily Gurpreet Ortiz MD        sertraline (ZOLOFT) tablet 25 mg  25 mg Oral Daily Gurpreet Ortiz MD   25 mg at 07/19/22 2034    sodium chloride flush 0.9 % injection 5-40 mL  5-40 mL IntraVENous 2 times per day Gurpreet Ortiz MD   10 mL at 07/19/22 2034    sodium chloride flush 0.9 % injection 5-40 mL  5-40 mL IntraVENous PRN Gurpreet Ortiz MD        0.9 % sodium chloride infusion   IntraVENous PRN Gurpreet Ortiz MD        ondansetron (ZOFRAN-ODT) disintegrating tablet 4 mg  4 mg Oral Q8H PRN Gurpreet Ortiz MD        Or    ondansetron [facility]) injection 4 mg  4 mg IntraVENous Q6H PRN Gurpreet Ortiz MD        acetaminophen (TYLENOL) tablet 650 mg  650 mg Oral Q6H PRN Gurpreet Ortiz MD        Or    acetaminophen (TYLENOL) suppository 650 mg  650 mg Rectal Q6H PRN Gurpreet Ortiz MD        perflutren lipid microspheres (DEFINITY) injection 1.65 mg  1.5 mL IntraVENous ONCE PRN Gurpreet Ortiz MD        [facility]) tablet 8.6 mg  1 tablet Oral BID PRN Gurpreet Ortiz MD        midodrine (PROAMATINE) tablet 10 mg  10 mg Oral TID  Venus Estrada MD   10 mg at 07/19/22 1743    polyethylene glycol (GLYCOLAX) packet 17 g  17 g Oral Daily Gurpreet Ortiz MD         Allergies:  No Known Allergies    Social History:   Social History     Socioeconomic History    Marital status: Single     Spouse name: Not on file    Number of children: Not on file    Years of education: 12    Highest education level: Not on file   Occupational History    Occupation: retired   Tobacco Use    Smoking status: Former     Packs/day: 0.50     Years: 20.00     Pack years: 10.00     Types: Cigarettes     Quit date: 5/3/1996     Years since quittin.2    Smokeless tobacco: Never   Substance and Sexual Activity    Alcohol use: Yes     Alcohol/week: 2.0 standard drinks     Types: 2 Cans of beer per week    Drug use: No    Sexual activity: Not Currently     Partners: Male   Other Topics Concern    Not on file   Social History Narrative    Not on file     Social Determinants of Health     Financial Resource Strain: Low Risk     Difficulty of Paying Living Expenses: Not hard at all   Food Insecurity: No Food Insecurity    Worried About [address] in the Last Year: Never true    [address]  [employer] in the Last Year: Never true   Transportation Needs: No Transportation Needs    Lack of Transportation (Medical): No    Lack of Transportation (Non-Medical):  No   Physical Activity: Inactive    Days of Exercise per Week: 0 days    Minutes of Exercise per Session: 0 min   Stress: Not on file   Social Connections: Not on file   Intimate Partner Violence: Not on file   Housing Stability: Low Risk     Unable to Pay for Housing in the Last Year: No    Number of Places Lived in the Last Year: 1    Unstable Housing in the Last Year: No          Family History:     Family History   Problem Relation Age of Onset    Cancer Mother         Pancreatic Cancer    Diabetes Mother     High Blood Pressure Mother     Breast Cancer Sister     Breast Cancer Maternal Aunt     Breast Cancer Maternal Cousin      REVIEW OF SYSTEMS:    Review of Systems    PHYSICAL EXAM:      Vitals:  BP (!) 112/57   Pulse 80   Temp 98.6 °F (37 °C) (Oral)   Resp 20   Ht 5' 4\" (1.626 m)   Wt 208 lb (94.3 kg)   LMP  (LMP Unknown)   SpO2 98%   BMI 35.70 kg/m²     CONSTITUTIONAL:  awake, alert, cooperative, no apparent distress, and appears stated age NAD  EYES:  pupils equal, round and reactive to light, extra ocular muscles intact,sclera clear, conjunctiva normal  NECK:  Supple, symmetrical, trachea midline, no adenopathy, thyroid symmetric, not enlarged and no tenderness, skin normal  HEMATOLOGIC/LYMPHATICS:  no cervical lymphadenopathy, nosupraclavicular lymphadenopathy, no axillary lymphadenopathy and no inguinal lymphadenopathy  LUNGS:  No increased work of breathing, good air exchange, clear to auscultation bilaterally, no crackles or wheezing  CARDIOVASCULAR:  , regular rate and rhythm, normal S1 and S2, no S3 or S4, and no murmur noted  ABDOMEN:  No scars, normal bowel sounds, soft, non-distended, non-tender, no masses palpated, no hepatosplenomegally      MUSCULOSKELETAL:  There is no redness, warmth, or swelling of the joints. Full range of motion noted. Motor strength is 5 out of 5 all extremities bilaterally. NEUROLOGIC:  Awake, alert, oriented to name, place andtime. Cranial nerves II-XII are grossly intact. Motor is 5 out of 5 bilaterally.   SKIN:  no bruising or bleeding      DATA:    PT/INR:    Recent Labs     07/19/22 0740 07/20/22 0518   PROT 6.6 6.2*   INR 1.26*  --      PTT:  Recent Labs     07/19/22 0740   APTT 33.8     CMP:    Lab Results   Component Value Date/Time     07/20/2022 05:18 AM    K 5.2 07/20/2022 05:18 AM    CL 96 07/20/2022 05:18 AM    CO2 20 07/20/2022 05:18 AM    BUN 33 07/20/2022 05:18 AM    PROT 6.2 07/20/2022 05:18 AM     Magnesium:    Lab Results   Component Value Date/Time    MG 2.10 07/07/2022 09:28 AM     Phosphorus:  No components found for: PO4  Calcium:  No results found for: CA  CBC:    Lab Results   Component Value Date/Time    WBC 10.9 07/20/2022 05:18 AM    RBC 3.60 07/20/2022 05:18 AM    HGB 10.6 07/20/2022 05:18 AM    HCT 31.5 07/20/2022 05:18 AM    MCV 87.4 07/20/2022 05:18 AM    RDW 12.9 07/20/2022 05:18 AM     07/20/2022 05:18 AM     DIFF:    Lab Results   Component Value Date/Time    MCV 87.4 07/20/2022 05:18 AM    RDW 12.9 07/20/2022 05:18 AM      LDH:  @labcrnt(LDH)@  Uric Acid:  @labcrnt(URIC)@    Radiology Review:  CT CHEST PULMONARY EMBOLISM ABDOMEN PELVIS W CONTRAST (7/19/2022): Impression   Chest:       Bilateral pulmonary emboli, left greater than right, with bilateral pleural   effusions, left greater than right and adjacent consolidation of the lungs,   likely atelectasis. There is mild dilatation of the right heart, IVC and   hepatic veins suggesting a component of right heart insufficiency. Results   discussed with [facility] by Phil Oneill. Catalina Esparza MD at 8:52 Am on   7/19/2022       Abdomen and pelvis:       Abdominal and pelvic ascites with findings of peritoneal carcinomatosis,   presumably due to ovarian carcinoma due to the nodular and irregular   appearance of the ovaries. Problem List  Patient Active Problem List   Diagnosis    HTN (hypertension)    HLD (hyperlipidemia)    Low back pain    Benign neoplasm of colon    Swelling, mass, or lump in head and neck    Chronic sinusitis    BERNIE (generalized anxiety disorder)    Major depressive disorder with single episode, in full remission (HCC)    Generalized abdominal pain    Adnexal mass    Peritoneal carcinomatosis (HCC)    Malignant ascites    Anorexia    Reactive depression    Acute pulmonary embolism (HCC)       IMPRESSION/RECOMMENDATIONS:    1.) Probable ovarian cancer  - Check CA-125.  - NPO now. - Will order a CT-guided biopsy of the mesentery in the future. Should probably have a retrievable IVC filter first.  - Will need a GYN onc consult to determine if she is a good debulking candidate, but likely she is not and we will proceed with chemo up front.     2.) PE  - CTPA, 7/19/2022, showed bilateral PEs left greater than right.  - Duplex ultrasound of the BLE, 7/19/2022, showed SVT of right greater saphenous vein extending to 1.15 cm from SFJ. DVT of left distal femoral, popliteal, gastrocnemius, posterior tibial, peroneal veins.  - On UFH. Keep he on this for now.    3.) HTN    Thank you for asking me to see the patient.        Inez Leyden, MD  PleaseContact Through [employer]

## 2022-07-20 NOTE — CONSULTS
Nataliia Hinkle MD                                                  Gynecologic Oncology                                                        ([phone]      Asked to see thi 79 y.o. with carcinomatosis on imaging  and a diagnosis of ovarian (or primary peritoneal) cancer with a profoundly elevated a  of 15,000. admitted with multiple pulmonary emboli n addition her picture is complicated by elevated troponin, acute on chronic kidney injury, hypertension, hyponatremia and malnutrition. She already has an appointment to see me 7/26/22. Given the findings on review of this record, having had the diagnosis established, the approach to treatment with such diffuse disease unable to be optimally surgically would be to begin with neoadjuvant  chemotherapy. With an appropriate response in terms of disease consolidation,  she would have an interval surgical debulking and then complete her chemotherapy. Now with her need for anticoagulation for several weeks, this treatment plan is doubly appropriate. We will confirm that the patient is aware of this appointment. Dr. Cr Amaya' staff will also have an appointment established shortly thereafter. Please recall for any acute surgical issues during this hospital stay. Nataliia Hinkle M.D., [address] [facility]  Consultative Gynecology  [phone]

## 2022-07-21 LAB
ANION GAP SERPL CALCULATED.3IONS-SCNC: 14 MMOL/L (ref 3–16)
ANTI-XA UNFRAC HEPARIN: 0.26 IU/ML (ref 0.3–0.7)
ANTI-XA UNFRAC HEPARIN: 0.3 IU/ML (ref 0.3–0.7)
ANTI-XA UNFRAC HEPARIN: 0.43 IU/ML (ref 0.3–0.7)
BUN BLDV-MCNC: 30 MG/DL (ref 7–20)
CALCIUM SERPL-MCNC: 9.7 MG/DL (ref 8.3–10.6)
CHLORIDE BLD-SCNC: 96 MMOL/L (ref 99–110)
CO2: 20 MMOL/L (ref 21–32)
CREAT SERPL-MCNC: 0.7 MG/DL (ref 0.6–1.2)
GFR AFRICAN AMERICAN: >60
GFR NON-AFRICAN AMERICAN: >60
GLUCOSE BLD-MCNC: 117 MG/DL (ref 70–99)
LV EF: 65 %
LVEF MODALITY: NORMAL
POTASSIUM SERPL-SCNC: 5.1 MMOL/L (ref 3.5–5.1)
SODIUM BLD-SCNC: 130 MMOL/L (ref 136–145)

## 2022-07-21 PROCEDURE — 85520 HEPARIN ASSAY: CPT

## 2022-07-21 PROCEDURE — 2580000003 HC RX 258: Performed by: HOSPITALIST

## 2022-07-21 PROCEDURE — 36415 COLL VENOUS BLD VENIPUNCTURE: CPT

## 2022-07-21 PROCEDURE — 6360000002 HC RX W HCPCS: Performed by: INTERNAL MEDICINE

## 2022-07-21 PROCEDURE — 80048 BASIC METABOLIC PNL TOTAL CA: CPT

## 2022-07-21 PROCEDURE — 2700000000 HC OXYGEN THERAPY PER DAY

## 2022-07-21 PROCEDURE — 93306 TTE W/DOPPLER COMPLETE: CPT

## 2022-07-21 PROCEDURE — 94640 AIRWAY INHALATION TREATMENT: CPT

## 2022-07-21 PROCEDURE — 2580000003 HC RX 258: Performed by: INTERNAL MEDICINE

## 2022-07-21 PROCEDURE — 99233 SBSQ HOSP IP/OBS HIGH 50: CPT | Performed by: HOSPITALIST

## 2022-07-21 PROCEDURE — 6370000000 HC RX 637 (ALT 250 FOR IP): Performed by: INTERNAL MEDICINE

## 2022-07-21 PROCEDURE — 2060000000 HC ICU INTERMEDIATE R&B

## 2022-07-21 PROCEDURE — 94761 N-INVAS EAR/PLS OXIMETRY MLT: CPT

## 2022-07-21 PROCEDURE — 6370000000 HC RX 637 (ALT 250 FOR IP): Performed by: HOSPITALIST

## 2022-07-21 RX ADMIN — HEPARIN SODIUM 1570 UNITS/HR: 10000 INJECTION, SOLUTION INTRAVENOUS at 03:55

## 2022-07-21 RX ADMIN — SERTRALINE 25 MG: 50 TABLET, FILM COATED ORAL at 08:28

## 2022-07-21 RX ADMIN — HEPARIN SODIUM 2600 UNITS: 1000 INJECTION INTRAVENOUS; SUBCUTANEOUS at 13:10

## 2022-07-21 RX ADMIN — MIDODRINE HYDROCHLORIDE 10 MG: 5 TABLET ORAL at 08:27

## 2022-07-21 RX ADMIN — MORPHINE SULFATE 15 MG: 15 TABLET ORAL at 22:26

## 2022-07-21 RX ADMIN — MIDODRINE HYDROCHLORIDE 10 MG: 5 TABLET ORAL at 11:48

## 2022-07-21 RX ADMIN — SODIUM CHLORIDE: 9 INJECTION, SOLUTION INTRAVENOUS at 08:31

## 2022-07-21 RX ADMIN — SODIUM CHLORIDE, PRESERVATIVE FREE 10 ML: 5 INJECTION INTRAVENOUS at 08:28

## 2022-07-21 RX ADMIN — PANTOPRAZOLE SODIUM 40 MG: 40 TABLET, DELAYED RELEASE ORAL at 08:35

## 2022-07-21 RX ADMIN — MORPHINE SULFATE 15 MG: 15 TABLET ORAL at 09:21

## 2022-07-21 RX ADMIN — FLUTICASONE PROPIONATE 1 SPRAY: 50 SPRAY, METERED NASAL at 08:28

## 2022-07-21 RX ADMIN — ALBUTEROL SULFATE 2.5 MG: 2.5 SOLUTION RESPIRATORY (INHALATION) at 10:44

## 2022-07-21 RX ADMIN — HEPARIN SODIUM 1700 UNITS/HR: 10000 INJECTION, SOLUTION INTRAVENOUS at 20:55

## 2022-07-21 RX ADMIN — ATORVASTATIN CALCIUM 10 MG: 10 TABLET, FILM COATED ORAL at 20:43

## 2022-07-21 RX ADMIN — MORPHINE SULFATE 15 MG: 15 TABLET ORAL at 15:25

## 2022-07-21 RX ADMIN — Medication 1 TABLET: at 08:28

## 2022-07-21 RX ADMIN — MORPHINE SULFATE 15 MG: 15 TABLET ORAL at 00:19

## 2022-07-21 RX ADMIN — MIDODRINE HYDROCHLORIDE 10 MG: 5 TABLET ORAL at 16:46

## 2022-07-21 ASSESSMENT — PAIN SCALES - GENERAL
PAINLEVEL_OUTOF10: 10
PAINLEVEL_OUTOF10: 6
PAINLEVEL_OUTOF10: 9

## 2022-07-21 ASSESSMENT — PAIN DESCRIPTION - LOCATION: LOCATION: ABDOMEN

## 2022-07-21 NOTE — OP NOTE
[facility]                 Prakash Oviedo                                OPERATIVE REPORT    PATIENT NAME: Marisela Briones                     :        1955  MED REC NO:   [id number]                          ROOM:       0084  ACCOUNT NO:   [de-identified]                           ADMIT DATE: 2022  PROVIDER:     Evonne Stanton MD    DATE OF PROCEDURE:  2022    PREOPERATIVE DIAGNOSES:  1. Deep venous thrombosis with pulmonary emboli. 2.  Presumed ovarian cancer. POSTOPERATIVE DIAGNOSES:  1. Deep venous thrombosis with pulmonary emboli. 2.  Presumed ovarian cancer. PROCEDURES PERFORMED:  1. Ultrasound-guided right femoral venous access. 2.  Inferior vena cavogram with placement of Celect retrievable inferior  vena cava filter. ANESTHESIA:  Local.    SURGEON:  Evonne Stanton MD    INDICATIONS:  The patient is a 40-year-old female who is admitted with  the DVT and pulmonary emboli. She has been diagnosed with a presumed  ovarian cancer, but needs further definitive workup including liver  biopsy and perhaps paracentesis. She is currently on anticoagulation  which will need to be held to facilitate these procedure. Therefore I  have been asked to place an IVC filter. PROCEDURE:  The patient was brought to the angio suite, placed in the  supine position. Continuous monitoring was then placed, including EKG,  blood pressure, pulse oximetry. Due to the patient's significant  shortness of breath and low oxygen saturations, no sedation was used. The right femoral region was prepped and draped in sterile fashion. 1%  lidocaine was infiltrated in the skin and subcutaneous tissues using  ultrasound. The common femoral vein was identified. This was patent  and easily compressible and free of thrombus. Under direct  visualization this was accessed and a 5-Guyanese micropuncture sheath was  placed.   Through the micropuncture sheath, a Bentson wire was advanced  into the inferior vena cava. The skin and subcutaneous tract were  dilated using a 10-Vietnamese dilator. Over the Bentson wire, the  introducer sheath and dilator for the Celect inferior vena cava filter  were advanced. Inferior vena cavogram was performed using dilute  intravenous contrast.  The level of the renal veins was clearly  identified. The inferior vena cava below the renal veins was measured  using a computer software and had a diameter of approximately 19 mm. The dilator was removed from the introducer sheath and the filter device  was advanced through the sheath and then unsheathed just below the renal  veins and deployed in position. The delivery catheter and sheath were  then removed. Pressure was held in the groin for approximately 10  minutes achieving excellent hemostasis. The patient was then  transferred to her hospital bed in a stable condition.   Estimated blood  loss throughout the procedure was minimal.        Virgie Loera MD    D: 07/20/2022 13:46:07       T: 07/20/2022 13:48:22     [email or web address]  Job#: [id number]     Doc#: [id number]    CC:

## 2022-07-21 NOTE — ONCOLOGY
ONCOLOGY HEMATOLOGY CARE PROGRESS NOTE      SUBJECTIVE:     Afebrile and on 5 LPM by NC. She has not eaten breakfast.    ROS:   The remaining 10 point review of symptoms is unremarkable. OBJECTIVE        Physical    VITALS:  /70   Pulse 75   Temp 97 °F (36.1 °C) (Axillary)   Resp 20   Ht 5' 4\" (1.626 m)   Wt 208 lb (94.3 kg)   LMP  (LMP Unknown)   SpO2 91%   BMI 35.70 kg/m²   TEMPERATURE:  Current - Temp: 97 °F (36.1 °C); Max - Temp  Av.8 °F (36.6 °C)  Min: 97 °F (36.1 °C)  Max: 98.4 °F (36.9 °C)  PULSE OXIMETRY RANGE: SpO2  Av.5 %  Min: 91 %  Max: 96 %  24HR INTAKE/OUTPUT:    Intake/Output Summary (Last 24 hours) at 2022 8565  Last data filed at 2022 1756  Gross per 24 hour   Intake 1342.96 ml   Output 250 ml   Net 1092.96 ml       CONSTITUTIONAL:  awake, alert, cooperative, no apparent distress, HEENT oral pharynx , no scleral icterus  HEMATOLOGIC/LYMPHATICS:  no cervical lymphadenopathy, no supraclavicular lymphadenopathy, no axillary lymphadenopathy and no inguinal lymphadenopathy  LUNGS:  No increased work of breathing, good air exchange, clear to auscultation bilaterally, no crackles or wheezing  CARDIOVASCULAR:  , regular rate and rhythm, normal S1 and S2, no S3 or S4, and no murmur noted  ABDOMEN:  No scars, normal bowel sounds, soft, non-distended, non-tender, no masses palpated, no hepatosplenomegally  MUSCULOSKELETAL:  There is no redness, warmth, or swelling of the joints. EXTREMETIES: No clubbing cynosis or edema  NEUROLOGIC:  Awake, alert, oriented to name, place and time. Cranial nerves II-XII are grossly intact. Motor is 5 out of 5 bilaterally.    SKIN:  no bruising or bleeding      Data      Recent Labs     22   WBC 17.3* 10.9   HGB 11.4* 10.6*   HCT 35.3* 31.5*    232   MCV 88.6 87.4        Recent Labs     22  0722   * 130*   K 5.1 5.2*   CL 91* 96*   CO2 21 20* BUN 29* 33*   CREATININE 1.8* 1.0     Recent Labs     07/19/22  0740 07/20/22  0518   AST 75* 83*   ALT 27 37*   BILIDIR  --  <0.2   BILITOT 0.5 0.4   ALKPHOS 58 54       Magnesium:    Lab Results   Component Value Date/Time    MG 2.10 07/07/2022 09:28 AM         Problem List  Patient Active Problem List   Diagnosis    HTN (hypertension)    HLD (hyperlipidemia)    Low back pain    Benign neoplasm of colon    Swelling, mass, or lump in head and neck    Chronic sinusitis    BERNIE (generalized anxiety disorder)    Major depressive disorder with single episode, in full remission (HCC)    Generalized abdominal pain    Adnexal mass    Peritoneal carcinomatosis (HCC)    Malignant ascites    Anorexia    Reactive depression    Acute pulmonary embolism (HCC)    Acute deep vein thrombosis (DVT) of proximal vein of both lower extremities (HCC)       ASSESSMENT AND PLAN  1.) Probable ovarian cancer  -CA-125 70697 U/mL. - NPO now. - Will order a CT-guided biopsy of the mesentery. 2.) PE  - CTPA, 7/19/2022, showed bilateral PEs left greater than right.  - Duplex ultrasound of the BLE, 7/19/2022, showed SVT of right greater saphenous vein extending to 1.15 cm from SFJ. DVT of left distal femoral, popliteal, gastrocnemius, posterior tibial, peroneal veins.  - IVC filter placed, 7/20/2022, with Dr. Kaelyn Mendoza.  - On [facility]. Keep her on this for now and hold prior to the biopsy.      3.) HTN          ONCOLOGIC DISPOSITION:    Yuriy Ragland MD  Please contact through [facility]

## 2022-07-21 NOTE — PLAN OF CARE
Problem: Discharge Planning  Goal: Discharge to home or other facility with appropriate resources  Outcome: Progressing  Flowsheets (Taken 7/21/2022 0828)  Discharge to home or other facility with appropriate resources: Identify barriers to discharge with patient and caregiver     Problem: Safety - Adult  Goal: Free from fall injury  Outcome: Progressing     Problem: Chronic Conditions and Co-morbidities  Goal: Patient's chronic conditions and co-morbidity symptoms are monitored and maintained or improved  Outcome: Progressing  Flowsheets (Taken 7/21/2022 0828)  Care Plan - Patient's Chronic Conditions and Co-Morbidity Symptoms are Monitored and Maintained or Improved: Monitor and assess patient's chronic conditions and comorbid symptoms for stability, deterioration, or improvement     Problem: Skin/Tissue Integrity  Goal: Absence of new skin breakdown  Description: 1. Monitor for areas of redness and/or skin breakdown  2. Assess vascular access sites hourly  3. Every 4-6 hours minimum:  Change oxygen saturation probe site  4. Every 4-6 hours:  If on nasal continuous positive airway pressure, respiratory therapy assess nares and determine need for appliance change or resting period.   Outcome: Progressing     Problem: Respiratory - Adult  Goal: Achieves optimal ventilation and oxygenation  Outcome: Progressing     Problem: Pain  Goal: Verbalizes/displays adequate comfort level or baseline comfort level  Outcome: Progressing     Problem: ABCDS Injury Assessment  Goal: Absence of physical injury  Outcome: Progressing

## 2022-07-21 NOTE — PROGRESS NOTES
Hospitalist Progress Note      PCP: Noah Carpenter MD    Date of Admission: 7/19/2022    Chief Complaint:   Abd discomfort    Hospital Course:      79 y.o. female who presented to [facility] with increasing shortness of breath. Patient recently diagnosed with ovarian cancer on 7/10/2022. Patient had presented to the emergency department with increasing abdominal distention. CAT scan shows metastatic ovarian CA. Patient was scheduled to see Dr. Lakesha Carrington in [facility] tomorrow. Over the past week patient with progressive shortness of breath. She states yesterday it became severe. She denies history of COPD. Remote tobacco history. No oxygen use at home. Patient has been using morphine for pain control. Abdominal distention secondary to ascites per previous CT scan. Patient denies any swelling in her legs although she has had some discomfort in her upper thighs. Her last BM was this morning no blood or melena noted. Subjective:   Patient still with decreased appetite. She states she does get short of breath with activity. No fevers or chills. Shortness of breath persist.  Patient on 6 L oxygen per minute.     Medications:  Reviewed    Infusion Medications    sodium chloride 50 mL/hr at 07/21/22 0831    heparin (PORCINE) Infusion 1,700 Units/hr (07/21/22 1311)    sodium chloride       Scheduled Medications    [Held by provider] aspirin  81 mg Oral Daily    Coenzyme Q10  10 mg Oral Daily    fluticasone  1 spray Each Nostril Daily    atorvastatin  10 mg Oral Daily    therapeutic multivitamin-minerals  1 tablet Oral Daily    pantoprazole  40 mg Oral QAM AC    sennosides-docusate sodium  2 tablet Oral Daily    sertraline  25 mg Oral Daily    sodium chloride flush  5-40 mL IntraVENous 2 times per day    midodrine  10 mg Oral TID WC    polyethylene glycol  17 g Oral Daily     PRN Meds: albuterol, heparin (porcine), heparin (porcine), morphine, sodium chloride flush, sodium chloride, ondansetron **OR** ondansetron, acetaminophen **OR** acetaminophen, perflutren lipid microspheres, senna      Intake/Output Summary (Last 24 hours) at 7/21/2022 1450  Last data filed at 7/21/2022 1311  Gross per 24 hour   Intake 862.96 ml   Output 550 ml   Net 312.96 ml         Physical Exam Performed:    /85   Pulse 87   Temp 98 °F (36.7 °C) (Oral)   Resp 18   Ht 5' 4\" (1.626 m)   Wt 208 lb (94.3 kg)   LMP  (LMP Unknown)   SpO2 92%   BMI 35.70 kg/m²     General appearance: No acute distress, appears stated age and cooperative. HEENT: Pupils equal, round, and reactive to light. Conjunctivae/corneas clear. Neck: Supple, with full range of motion. No jugular venous distention. Trachea midline. Respiratory:  Normal respiratory effort. Clear to auscultation, bilaterally without Rales/Wheezes/Rhonchi. Cardiovascular: Regular rate and rhythm with normal S1/S2 without murmurs, rubs or gallops. Abdomen: Distended but soft. Dull in flanks  Musculoskeletal: No clubbing, cyanosis. Edema bilaterally LE. Full range of motion without deformity. Skin: Skin color, texture, turgor normal.  No rashes or lesions. Neurologic:  Neurovascularly intact without any focal sensory/motor deficits.  Cranial nerves: II-XII intact, grossly non-focal.  Psychiatric: Alert and oriented, thought content appropriate, normal insight  Capillary Refill: Brisk,3 seconds, normal   Peripheral Pulses: +2 palpable, equal bilaterally       Labs:   Recent Labs     07/19/22  0740 07/20/22  0518   WBC 17.3* 10.9   HGB 11.4* 10.6*   HCT 35.3* 31.5*    232       Recent Labs     07/19/22  0740 07/20/22  0518 07/21/22  0511   * 130* 130*   K 5.1 5.2* 5.1   CL 91* 96* 96*   CO2 21 20* 20*   BUN 29* 33* 30*   CREATININE 1.8* 1.0 0.7   CALCIUM 9.9 10.0 9.7       Recent Labs     07/19/22  0740 07/20/22  0518   AST 75* 83*   ALT 27 43*   BILIDIR  --  <0.2   BILITOT 0.5 0.4   ALKPHOS 58 54       Recent Labs     07/19/22  0740   INR 1.26*       Recent Labs 07/19/22  0740 07/19/22  1603 07/19/22  2140   TROPONINI 0.54* 0.29* 0.27*         Urinalysis:      Lab Results   Component Value Date/Time    NITRU Negative 07/19/2022 02:52 PM    WBCUA 3-5 07/19/2022 02:52 PM    BACTERIA Rare 07/19/2022 02:52 PM    RBCUA 5-10 07/19/2022 02:52 PM    BLOODU Negative 07/19/2022 02:52 PM    SPECGRAV 1.010 07/19/2022 02:52 PM    GLUCOSEU Negative 07/19/2022 02:52 PM       Radiology:  VL Extremity Venous Bilateral         CT CHEST PULMONARY EMBOLISM W CONTRAST   Final Result   Chest:      Bilateral pulmonary emboli, left greater than right, with bilateral pleural   effusions, left greater than right and adjacent consolidation of the lungs,   likely atelectasis. There is mild dilatation of the right heart, IVC and   hepatic veins suggesting a component of right heart insufficiency. Results   discussed with Cameron Cueto by Monster Elkins. Starla Sandoval MD at 8:52 Am on   7/19/2022      Abdomen and pelvis:      Abdominal and pelvic ascites with findings of peritoneal carcinomatosis,   presumably due to ovarian carcinoma due to the nodular and irregular   appearance of the ovaries. CT ABDOMEN PELVIS W IV CONTRAST Additional Contrast? None   Final Result   Chest:      Bilateral pulmonary emboli, left greater than right, with bilateral pleural   effusions, left greater than right and adjacent consolidation of the lungs,   likely atelectasis. There is mild dilatation of the right heart, IVC and   hepatic veins suggesting a component of right heart insufficiency. Results   discussed with Cameron Cueto by Monster Elkins. Starla Sandoval MD at 8:52 Am on   7/19/2022      Abdomen and pelvis:      Abdominal and pelvic ascites with findings of peritoneal carcinomatosis,   presumably due to ovarian carcinoma due to the nodular and irregular   appearance of the ovaries. XR CHEST PORTABLE   Final Result   No acute process.       Bibasilar hypoaeration         CT BIOPSY ABDOMEN RETROPERITONEUM (Results Pending)           Assessment/Plan:    Active Hospital Problems    Diagnosis     Acute deep vein thrombosis (DVT) of proximal vein of both lower extremities (HCC) [I82.4Y3]      Priority: Medium    Acute pulmonary embolism ([address] [I26.99]      Priority: Medium     1. Bilateral pulmonary emboli. Pulmonary following. Heme-onc following. Patient placed on heparin drip. Lower extremity Dopplers positive for DVT. Vascular surgery placed IVC filter. Transition to oral anticoagulant after biopsy. 2.  Elevated troponin. Trending down. Secondary to PE per cardiology. 3.  Ovarian cancer. Heme-onc following. Gyn/Onc consulted.  >[id number]. Plan for CT-guided mesenteric biopsy  4. Hypertension. Blood pressure improved. Will monitor. DVT Prophylaxis: Heparin drip  Diet: ADULT DIET; Regular; No Added Salt (3-4 gm)  ADULT ORAL NUTRITION SUPPLEMENT; Breakfast, Lunch, Dinner; Clear Liquid Oral Supplement  Code Status: Full Code    PT/OT Eval Status: Pending    Dispo - Home when stable 2-3 days.     Ailyn Babb MD

## 2022-07-21 NOTE — PROGRESS NOTES
Nephrology Note                                                                                                                                                                                                                                                                                                                                                               Office : [phone]     Fax :[phone]              Patient's Name: Emerald Parra  12:55 PM  7/21/2022    Reason for Consult: CHICHI    Requesting Physician:  Noah Carpenter MD      Chief Complaint:        Interval History :    CHICHI improved  K 5.1 , better  Na 130  For CT-guided biopsy of the mesentery. History of Present Ilness:    Emerald Parra is a 79 y.o. female with PMH of HTN , recent diagnosis of probable ovarian Ca    C/w SOB    CT chest with contrast s/o PE BL    Nephrology consult for CHICHI management      Poor po intake of fluids +  Loose stools on Miralax +  NSAIDs +      Past Medical History:   Diagnosis Date    Anxiety     Ascites     DVT (deep venous thrombosis) (HCC)     Hypertension     Ovarian ca ([facility] [address] 07/10/2022    mets    Pulmonary embolism ([facility] [address] 07/19/2022    bilateral       Past Surgical History:   Procedure Laterality Date    COLONOSCOPY  9-8-14    colon polyp removed, diverticulosis       Family History   Problem Relation Age of Onset    Cancer Mother         Pancreatic Cancer    Diabetes Mother     High Blood Pressure Mother     Breast Cancer Sister     Breast Cancer Maternal Aunt     Breast Cancer Maternal Cousin         reports that she quit smoking about 26 years ago. Her smoking use included cigarettes. She has a 10.00 pack-year smoking history. She has never used smokeless tobacco. She reports current alcohol use of about 2.0 standard drinks per week. She reports that she does not use drugs. Allergies:  Patient has no known allergies.     Current Medications:    0.9 % sodium chloride infusion, Continuous  albuterol (PROVENTIL) nebulizer solution 2.5 mg, Q6H PRN  heparin (porcine) injection 5,200 Units, PRN  heparin (porcine) injection 2,600 Units, PRN  heparin 25,000 units in dextrose 5% 250 mL (premix) infusion, Continuous  [Held by provider] aspirin chewable tablet 81 mg, Daily  Coenzyme Q10 CAPS 10 mg (Patient Supplied), Daily  fluticasone (FLONASE) 50 MCG/ACT nasal spray 1 spray, Daily  atorvastatin (LIPITOR) tablet 10 mg, Daily  morphine (MSIR) tablet 15 mg, Q4H PRN  therapeutic multivitamin-minerals 1 tablet, Daily  pantoprazole (PROTONIX) tablet 40 mg, QAM AC  sennosides-docusate sodium (SENOKOT-S) 8.6-50 MG tablet 2 tablet, Daily  sertraline (ZOLOFT) tablet 25 mg, Daily  sodium chloride flush 0.9 % injection 5-40 mL, 2 times per day  sodium chloride flush 0.9 % injection 5-40 mL, PRN  0.9 % sodium chloride infusion, PRN  ondansetron (ZOFRAN-ODT) disintegrating tablet 4 mg, Q8H PRN   Or  ondansetron (ZOFRAN) injection 4 mg, Q6H PRN  acetaminophen (TYLENOL) tablet 650 mg, Q6H PRN   Or  acetaminophen (TYLENOL) suppository 650 mg, Q6H PRN  perflutren lipid microspheres (DEFINITY) injection 1.65 mg, ONCE PRN  senna (SENOKOT) tablet 8.6 mg, BID PRN  midodrine (PROAMATINE) tablet 10 mg, TID WC  polyethylene glycol (GLYCOLAX) packet 17 g, Daily      Review of Systems:   14 point ROS obtained but were negative except mentioned in HPI      Physical exam:     Vitals:  /85   Pulse 87   Temp 98 °F (36.7 °C) (Oral)   Resp 18   Ht 5' 4\" (1.626 m)   Wt 208 lb (94.3 kg)   LMP  (LMP Unknown)   SpO2 92%   BMI 35.70 kg/m²   Constitutional:  OAA X3 NAD  Skin: no rash, turgor wnl  Heent:  eomi, mmm  Neck: no bruits or jvd noted  Cardiovascular:  S1, S2 without m/r/g  Respiratory: CTA B without w/r/r  Abdomen:  +bs, soft, nt, nd  Ext:  no lower extremity edema  Psychiatric: mood and affect appropriate  Musculoskeletal:  Rom, muscular strength intact    Data:   Labs:  CBC:   Recent Labs     07/19/22  0740 07/20/22  0518   WBC 17.3* 10.9   HGB 11.4* 10.6*    232       BMP:    Recent Labs     07/19/22  0740 07/20/22  0518 07/21/22  0511   * 130* 130*   K 5.1 5.2* 5.1   CL 91* 96* 96*   CO2 21 20* 20*   BUN 29* 33* 30*   CREATININE 1.8* 1.0 0.7   GLUCOSE 224* 126* 117*       Ca/Mg/Phos:   Recent Labs     07/19/22  0740 07/20/22  0518 07/21/22  0511   CALCIUM 9.9 10.0 9.7       Hepatic:   Recent Labs     07/19/22  0740 07/20/22  0518   AST 75* 83*   ALT 27 43*   BILITOT 0.5 0.4   ALKPHOS 58 54       Troponin:   Recent Labs     07/19/22  0740 07/19/22  1603 07/19/22  2140   TROPONINI 0.54* 0.29* 0.27*       BNP: No results for input(s): BNP in the last 72 hours. Lipids: No results for input(s): CHOL, TRIG, HDL, LDLCALC, LABVLDL in the last 72 hours. ABGs: No results for input(s): PHART, PO2ART, YMF5FXD in the last 72 hours. INR:   Recent Labs     07/19/22  0740   INR 1.26*       UA:  Recent Labs     07/19/22  1452   COLORU Yellow   CLARITYU Clear   GLUCOSEU Negative   BILIRUBINUR Negative   KETUA Negative   SPECGRAV 1.010   BLOODU Negative   PHUR 6.0   PROTEINU TRACE*   UROBILINOGEN 0.2   NITRU Negative   LEUKOCYTESUR Negative   LABMICR YES   Denise Damon NotGiven        Urine Microscopic:   Recent Labs     07/19/22  1452   BACTERIA Rare*   WBCUA 3-5   RBCUA 5-10*   EPIU 2-5       Urine Culture: No results for input(s): LABURIN in the last 72 hours. Urine Chemistry:   Recent Labs     07/19/22  1452   LABCREA 159.8   NAUR <20               IMAGING:  VL Extremity Venous Bilateral         CT CHEST PULMONARY EMBOLISM W CONTRAST   Final Result   Chest:      Bilateral pulmonary emboli, left greater than right, with bilateral pleural   effusions, left greater than right and adjacent consolidation of the lungs,   likely atelectasis. There is mild dilatation of the right heart, IVC and   hepatic veins suggesting a component of right heart insufficiency. Results   discussed with [address] by Magdiel Palma.  Sasha Mckay, [address] [place] [address]  Office : [phone]  Fax :[phone]

## 2022-07-21 NOTE — PROGRESS NOTES
Vascular    R Groin soft. No issues S/P IVC filter placement  Please call if I can be of further assistance.

## 2022-07-22 ENCOUNTER — APPOINTMENT (OUTPATIENT)
Dept: CT IMAGING | Age: 67
DRG: 754 | End: 2022-07-22
Payer: MEDICARE

## 2022-07-22 LAB
ANTI-XA UNFRAC HEPARIN: 0.23 IU/ML (ref 0.3–0.7)
ANTI-XA UNFRAC HEPARIN: <0.1 IU/ML (ref 0.3–0.7)
HCT VFR BLD CALC: 31 % (ref 36–48)
HEMOGLOBIN: 10.5 G/DL (ref 12–16)
INR BLD: 1.04 (ref 0.87–1.14)
MCH RBC QN AUTO: 29.2 PG (ref 26–34)
MCHC RBC AUTO-ENTMCNC: 33.9 G/DL (ref 31–36)
MCV RBC AUTO: 86.3 FL (ref 80–100)
PDW BLD-RTO: 13.2 % (ref 12.4–15.4)
PLATELET # BLD: 257 K/UL (ref 135–450)
PMV BLD AUTO: 8 FL (ref 5–10.5)
PROTHROMBIN TIME: 13.5 SEC (ref 11.7–14.5)
RBC # BLD: 3.6 M/UL (ref 4–5.2)
WBC # BLD: 8.2 K/UL (ref 4–11)

## 2022-07-22 PROCEDURE — 2580000003 HC RX 258: Performed by: INTERNAL MEDICINE

## 2022-07-22 PROCEDURE — 88341 IMHCHEM/IMCYTCHM EA ADD ANTB: CPT

## 2022-07-22 PROCEDURE — 85610 PROTHROMBIN TIME: CPT

## 2022-07-22 PROCEDURE — 77012 CT SCAN FOR NEEDLE BIOPSY: CPT

## 2022-07-22 PROCEDURE — 99232 SBSQ HOSP IP/OBS MODERATE 35: CPT | Performed by: HOSPITALIST

## 2022-07-22 PROCEDURE — 88305 TISSUE EXAM BY PATHOLOGIST: CPT

## 2022-07-22 PROCEDURE — 0DBW3ZX EXCISION OF PERITONEUM, PERCUTANEOUS APPROACH, DIAGNOSTIC: ICD-10-PCS | Performed by: INTERNAL MEDICINE

## 2022-07-22 PROCEDURE — 2060000000 HC ICU INTERMEDIATE R&B

## 2022-07-22 PROCEDURE — 6360000002 HC RX W HCPCS: Performed by: STUDENT IN AN ORGANIZED HEALTH CARE EDUCATION/TRAINING PROGRAM

## 2022-07-22 PROCEDURE — 2709999900 CT BIOPSY ABDOMEN RETROPERITONEUM

## 2022-07-22 PROCEDURE — 6360000002 HC RX W HCPCS: Performed by: INTERNAL MEDICINE

## 2022-07-22 PROCEDURE — 36415 COLL VENOUS BLD VENIPUNCTURE: CPT

## 2022-07-22 PROCEDURE — 85520 HEPARIN ASSAY: CPT

## 2022-07-22 PROCEDURE — 6370000000 HC RX 637 (ALT 250 FOR IP): Performed by: HOSPITALIST

## 2022-07-22 PROCEDURE — 88333 PATH CONSLTJ SURG CYTO XM 1: CPT

## 2022-07-22 PROCEDURE — 94761 N-INVAS EAR/PLS OXIMETRY MLT: CPT

## 2022-07-22 PROCEDURE — 85027 COMPLETE CBC AUTOMATED: CPT

## 2022-07-22 PROCEDURE — 88342 IMHCHEM/IMCYTCHM 1ST ANTB: CPT

## 2022-07-22 PROCEDURE — 6370000000 HC RX 637 (ALT 250 FOR IP): Performed by: INTERNAL MEDICINE

## 2022-07-22 PROCEDURE — 94640 AIRWAY INHALATION TREATMENT: CPT

## 2022-07-22 PROCEDURE — 2700000000 HC OXYGEN THERAPY PER DAY

## 2022-07-22 RX ORDER — MIDAZOLAM HYDROCHLORIDE 1 MG/ML
INJECTION INTRAMUSCULAR; INTRAVENOUS
Status: COMPLETED | OUTPATIENT
Start: 2022-07-22 | End: 2022-07-22

## 2022-07-22 RX ORDER — FENTANYL CITRATE 50 UG/ML
INJECTION, SOLUTION INTRAMUSCULAR; INTRAVENOUS
Status: COMPLETED | OUTPATIENT
Start: 2022-07-22 | End: 2022-07-22

## 2022-07-22 RX ORDER — HEPARIN SODIUM 1000 [USP'U]/ML
2600 INJECTION, SOLUTION INTRAVENOUS; SUBCUTANEOUS ONCE
Status: COMPLETED | OUTPATIENT
Start: 2022-07-22 | End: 2022-07-22

## 2022-07-22 RX ORDER — HEPARIN SODIUM 10000 [USP'U]/100ML
2090 INJECTION, SOLUTION INTRAVENOUS CONTINUOUS
Status: DISCONTINUED | OUTPATIENT
Start: 2022-07-22 | End: 2022-07-26

## 2022-07-22 RX ADMIN — MIDODRINE HYDROCHLORIDE 10 MG: 5 TABLET ORAL at 08:42

## 2022-07-22 RX ADMIN — MIDODRINE HYDROCHLORIDE 10 MG: 5 TABLET ORAL at 11:55

## 2022-07-22 RX ADMIN — MIDAZOLAM HYDROCHLORIDE 0.5 MG: 2 INJECTION, SOLUTION INTRAMUSCULAR; INTRAVENOUS at 15:08

## 2022-07-22 RX ADMIN — HEPARIN SODIUM 2600 UNITS: 1000 INJECTION INTRAVENOUS; SUBCUTANEOUS at 01:28

## 2022-07-22 RX ADMIN — ALBUTEROL SULFATE 2.5 MG: 2.5 SOLUTION RESPIRATORY (INHALATION) at 08:02

## 2022-07-22 RX ADMIN — SERTRALINE 25 MG: 50 TABLET, FILM COATED ORAL at 08:42

## 2022-07-22 RX ADMIN — MORPHINE SULFATE 15 MG: 15 TABLET ORAL at 08:45

## 2022-07-22 RX ADMIN — ATORVASTATIN CALCIUM 10 MG: 10 TABLET, FILM COATED ORAL at 20:25

## 2022-07-22 RX ADMIN — MORPHINE SULFATE 15 MG: 15 TABLET ORAL at 17:31

## 2022-07-22 RX ADMIN — HEPARIN SODIUM 1830 UNITS/HR: 10000 INJECTION, SOLUTION INTRAVENOUS at 21:36

## 2022-07-22 RX ADMIN — ONDANSETRON 4 MG: 4 TABLET, ORALLY DISINTEGRATING ORAL at 20:24

## 2022-07-22 RX ADMIN — FENTANYL CITRATE 25 MCG: 50 INJECTION INTRAMUSCULAR; INTRAVENOUS at 15:08

## 2022-07-22 RX ADMIN — ALBUTEROL SULFATE 2.5 MG: 2.5 SOLUTION RESPIRATORY (INHALATION) at 23:26

## 2022-07-22 RX ADMIN — MIDODRINE HYDROCHLORIDE 10 MG: 5 TABLET ORAL at 17:31

## 2022-07-22 RX ADMIN — Medication 1 TABLET: at 08:42

## 2022-07-22 RX ADMIN — SODIUM CHLORIDE, PRESERVATIVE FREE 10 ML: 5 INJECTION INTRAVENOUS at 20:25

## 2022-07-22 RX ADMIN — SODIUM CHLORIDE, PRESERVATIVE FREE 10 ML: 5 INJECTION INTRAVENOUS at 08:48

## 2022-07-22 RX ADMIN — MORPHINE SULFATE 15 MG: 15 TABLET ORAL at 21:30

## 2022-07-22 ASSESSMENT — PAIN DESCRIPTION - DESCRIPTORS: DESCRIPTORS: DISCOMFORT;ACHING

## 2022-07-22 ASSESSMENT — PAIN SCALES - GENERAL
PAINLEVEL_OUTOF10: 6
PAINLEVEL_OUTOF10: 7
PAINLEVEL_OUTOF10: 8

## 2022-07-22 ASSESSMENT — PAIN DESCRIPTION - LOCATION
LOCATION: ABDOMEN
LOCATION: ABDOMEN

## 2022-07-22 ASSESSMENT — PAIN DESCRIPTION - PAIN TYPE: TYPE: ACUTE PAIN

## 2022-07-22 NOTE — OR NURSING
Image guided mesenteric biopsy completed by Dr. Eva Willis. Pt tolerated procedure without any signs or symptoms of distress. Vital signs stable. Report given  to  RN. Pt transported back to 434 in stable condition via stretcher. Total Biopsy: 4  Received: Versed: .5 mg       Fentanyl: 25 mcg  Bandage to right abd that is dry and intact site is leaking orange fluid.  Dressing changed prior to going back to the floor    Can restart Heparin IV: 6 hours after procedure    Vital Signs  Vitals:    07/22/22 1527   BP: (!) 157/87   Pulse: 85   Resp: 22   Temp:    SpO2: 97%    (vital signs in table format)    Post Lala  2 - Able to move 4 extremities voluntarily on command  2 - BP+/- 20mmHg of normal  2 - Fully awake  1 - Needs oxygen to maintain oxygen saturation >90%  2 - Able to breathe deeply and cough freely

## 2022-07-22 NOTE — FLOWSHEET NOTE
07/21/22 2045   Assessment   Charting Type Shift assessment   Psychosocial   Psychosocial (WDL) WDL   Neurological   Neuro (WDL) WDL   Level of Consciousness Alert (0)   HEENT (Head, Ears, Eyes, Nose, & Throat)   HEENT (WDL) X   Right Eye Glasses   Left Eye Glasses   Teeth Dentures upper;Dentures lower   Respiratory   Respiratory (WDL) X   Respiratory Pattern Regular   Respiratory Depth Shallow;Normal   Respiratory Quality/Effort Dyspnea with exertion;Dyspnea at rest   Chest Assessment Trachea midline; Chest expansion symmetrical   L Breath Sounds Inspiratory Wheezes; Diminished   R Breath Sounds Inspiratory Wheezes; Diminished   Breath Sounds   Right Upper Lobe Diminished   Right Middle Lobe Diminished; Inspiratory Wheezes   Right Lower Lobe Diminished; Inspiratory Wheezes   Left Upper Lobe Diminished   Left Lower Lobe Diminished; Inspiratory Wheezes   Cardiac   Cardiac (WDL) WDL   Cardiac Regularity Regular   Heart Sounds S1, S2   Cardiac Rhythm Sinus rhythm   Rhythm Interpretation   Heart Rate 67   Cardiac Monitor   Alarm Audible Yes   Gastrointestinal   Abdominal (WDL) X   Abdomen Inspection Distended   RUQ Bowel Sounds Active   LUQ Bowel Sounds Active   RLQ Bowel Sounds Active   LLQ Bowel Sounds Active   GI Symptoms Distention;Bloating;Diarrhea   Tenderness Nontender   Genitourinary   Genitourinary (WDL) WDL   Suprapubic Tenderness No   Dysuria (Pain/Burning w/Urination) No   Peripheral Vascular   Peripheral Vascular (WDL) X   Edema Left lower extremity;Right lower extremity   RLE Neurovascular Assessment   RLE Sensation  Full sensation   R Pedal Pulse +2   Puncture Site Assessment 1   Location Femoral - right   Site Assessment No redness, drainage, swelling or hematoma   Dressing Applied Transparent occlusive dressing   Skin Integumentary    Skin Integumentary (WDL) WDL   Musculoskeletal   Musculoskeletal (WDL) WDL

## 2022-07-22 NOTE — OR NURSING
Time out completed prior to procedure. Pt was place supine on the CT table. Pt was placed on all cardiac monitoring.  Pt arrived on 6 L NC

## 2022-07-22 NOTE — PRE SEDATION
Sedation Pre-Procedure Note    Patient Name: Lona Alvarez   YOB: 1955  Room/Bed: [facility]0198[facility]  Medical Record Number: [id number]  Date: 7/22/2022   Time: 2:57 PM       Indication:  pt with abdominal pain found to have mesenteric nodularity concerning for peritoneal carcinomatosis. Pt here for biopsy. Consent: I have discussed with the patient and/or the patient representative the indication, alternatives, and the possible risks and/or complications of the planned procedure and the anesthesia methods. The patient and/or patient representative appear to understand and agree to proceed. Vital Signs:   Vitals:    07/22/22 1153   BP: (!) 140/94   Pulse: 80   Resp: 16   Temp:    SpO2: 94%       Past Medical History:   has a past medical history of Anxiety, Ascites, DVT (deep venous thrombosis) ([facility] [address] Hypertension, Ovarian ca ([facility] [address] and Pulmonary embolism ([facility] [address] Past Surgical History:   has a past surgical history that includes Colonoscopy (9-8-14). Medications:   Scheduled Meds:    [Held by provider] aspirin  81 mg Oral Daily    Coenzyme Q10  10 mg Oral Daily    fluticasone  1 spray Each Nostril Daily    atorvastatin  10 mg Oral Daily    therapeutic multivitamin-minerals  1 tablet Oral Daily    pantoprazole  40 mg Oral QAM AC    sennosides-docusate sodium  2 tablet Oral Daily    sertraline  25 mg Oral Daily    sodium chloride flush  5-40 mL IntraVENous 2 times per day    midodrine  10 mg Oral TID WC    polyethylene glycol  17 g Oral Daily     Continuous Infusions:    sodium chloride 50 mL/hr at 07/21/22 0831    sodium chloride       PRN Meds: albuterol, heparin (porcine), heparin (porcine), morphine, sodium chloride flush, sodium chloride, ondansetron **OR** ondansetron, acetaminophen **OR** acetaminophen, perflutren lipid microspheres, senna  Home Meds:   Prior to Admission medications    Medication Sig Start Date End Date Taking?  Authorizing Provider   morphine (MSIR) 15 MG tablet Take 1 Anesthesia Complications:   none    ASA Classification:  Class 3 - A patient with severe systemic disease that limits activity but is not incapacitating    Sedation/ Anesthesia Plan:   intravenous sedation    Medications Planned:   midazolam (Versed) intravenously and fentanyl intravenously    Patient is an appropriate candidate for plan of sedation: yes    Electronically signed by Celia Foss MD on 7/22/2022 at 2:57 PM

## 2022-07-22 NOTE — OR NURSING
Pt arrived for image guided mesenteric biopsy . Procedure explained including the risk and benefits of the procedure. All questions answered. Pt verbalizes understanding of the of procedure and states no more questions. Consent signed. Vital signs stable, labs, allergies, medications, and code status reviewed. No contraindications noted. Vitals:    07/22/22 1501   BP: (!) 147/86   Pulse: 84   Resp: 20   Temp:    SpO2: 96%    (vital signs in table format)    Lala Score  2 - Able to move 4 extremities voluntarily on command  2 - BP+/- 20mmHg of normal  2 - Fully awake  1 - Needs oxygen to maintain oxygen saturation >90%  2 - Able to breathe deeply and cough freely    Allergies  Patient has no known allergies.     Labs  Lab Results   Component Value Date    INR 1.04 07/22/2022    PROTIME 13.5 07/22/2022       Lab Results   Component Value Date    CREATININE 0.7 07/21/2022    BUN 30 (H) 07/21/2022    GFR >60 05/22/2013     (L) 07/21/2022    K 5.1 07/21/2022    CL 96 (L) 07/21/2022    CO2 20 (L) 07/21/2022       Lab Results   Component Value Date    WBC 8.2 07/22/2022    HGB 10.5 (L) 07/22/2022    HCT 31.0 (L) 07/22/2022    MCV 86.3 07/22/2022     07/22/2022

## 2022-07-22 NOTE — ACP (ADVANCE CARE PLANNING)
Advance Care Planning     Advance Care Planning Inpatient Note  Spiritual Care Department    Today's Date: 7/22/2022  Unit: [id number] [address] PCU    Received request from IDT Member. Upon review of chart and communication with care team, patient's decision making abilities are not in question. . Patient and Niece and friend  was/were present in the room during visit. Goals of ACP Conversation:  Discuss advance care planning documents    Summary:  Updated Healthcare Decision Maker      Assessment:  Pt going for a biopsy today, expressing fear of the outcome. Pt said she wants her niece Tejal Cavazos whom she said is a nurse to be her primary decision maker. This writer offered education and clarified a few concerns. Pt and family said they want to read through document before completing. Blank document given to pt. Educated pt and family on how to call spiritual care to assist them complete document.     Interventions:  Provided education on documents for clarity and greater understanding  Reviewed but did not complete ACP document      Outcomes/Plan:  ACP Discussion: Postponed    Electronically signed by Chaplain Js on 7/22/2022 at 2:15 PM

## 2022-07-22 NOTE — BRIEF OP NOTE
Brief Postoperative Note    Jyotsna Velasquez  YOB: 1955  [id number]    Pre-operative Diagnosis: peritoneal nodularity    Post-operative Diagnosis: Same    Procedure: image guided biopsy    Anesthesia: Local and Moderate Sedation    Surgeons/Assistants: Salazar Church MD    Estimated Blood Loss: less than 5    Complications: None    Specimens: Was Obtained: 4    Findings: successful CT guided biopsy of RUQ peritoneal nodularity.     Electronically signed by Salazar Church MD on 7/22/2022 at 3:31 PM

## 2022-07-22 NOTE — ONCOLOGY
ONCOLOGY HEMATOLOGY CARE PROGRESS NOTE      SUBJECTIVE:     Afebrile and on 5 LPM by NC. Awaits a biopsy today. NPO.    ROS:   The remaining 10 point review of symptoms is unremarkable. OBJECTIVE        Physical    VITALS:  /82   Pulse 79   Temp 97.6 °F (36.4 °C) (Oral)   Resp 16   Ht 5' 4\" (1.626 m)   Wt 209 lb 3.2 oz (94.9 kg)   LMP  (LMP Unknown)   SpO2 96%   BMI 35.91 kg/m²   TEMPERATURE:  Current - Temp: 97.6 °F (36.4 °C); Max - Temp  Av °F (36.7 °C)  Min: 97.6 °F (36.4 °C)  Max: 98.2 °F (36.8 °C)  PULSE OXIMETRY RANGE: SpO2  Av %  Min: 92 %  Max: 96 %  24HR INTAKE/OUTPUT:    Intake/Output Summary (Last 24 hours) at 2022 4970  Last data filed at 2022 0458  Gross per 24 hour   Intake 120 ml   Output 1000 ml   Net -880 ml         CONSTITUTIONAL:  awake, alert, cooperative, no apparent distress, HEENT oral pharynx , no scleral icterus  HEMATOLOGIC/LYMPHATICS:  no cervical lymphadenopathy, no supraclavicular lymphadenopathy, no axillary lymphadenopathy and no inguinal lymphadenopathy  LUNGS:  No increased work of breathing, good air exchange, clear to auscultation bilaterally, no crackles or wheezing  CARDIOVASCULAR:  , regular rate and rhythm, normal S1 and S2, no S3 or S4, and no murmur noted  ABDOMEN:  No scars, normal bowel sounds, soft, non-distended, non-tender, no masses palpated, no hepatosplenomegally  MUSCULOSKELETAL:  There is no redness, warmth, or swelling of the joints. EXTREMETIES: No clubbing cynosis or edema  NEUROLOGIC:  Awake, alert, oriented to name, place and time. Cranial nerves II-XII are grossly intact. Motor is 5 out of 5 bilaterally.    SKIN:  no bruising or bleeding      Data      Recent Labs     2218   WBC 17.3* 10.9   HGB 11.4* 10.6*   HCT 35.3* 31.5*    232   MCV 88.6 87.4          Recent Labs     2218 22  0511   * 130* 130*   K 5.1 5.2* 5.1 CL 91* 96* 96*   CO2 21 20* 20*   BUN 29* 33* 30*   CREATININE 1.8* 1.0 0.7       Recent Labs     07/19/22  0740 07/20/22  0518   AST 75* 83*   ALT 27 43*   BILIDIR  --  <0.2   BILITOT 0.5 0.4   ALKPHOS 58 54         Magnesium:    Lab Results   Component Value Date/Time    MG 2.10 07/07/2022 09:28 AM         Problem List  Patient Active Problem List   Diagnosis    HTN (hypertension)    HLD (hyperlipidemia)    Low back pain    Benign neoplasm of colon    Swelling, mass, or lump in head and neck    Chronic sinusitis    BERNIE (generalized anxiety disorder)    Major depressive disorder with single episode, in full remission (HCC)    Generalized abdominal pain    Adnexal mass    Peritoneal carcinomatosis (HCC)    Malignant ascites    Anorexia    Reactive depression    Acute pulmonary embolism (HCC)    Acute deep vein thrombosis (DVT) of proximal vein of both lower extremities (HCC)       ASSESSMENT AND PLAN  1.) Probable ovarian cancer  -CA-125 64790 U/mL. - NPO.  - Awaits a CT-guided biopsy of the mesentery. 2.) PE  - CTPA, 7/19/2022, showed bilateral PEs left greater than right.  - Duplex ultrasound of the BLE, 7/19/2022, showed SVT of right greater saphenous vein extending to 1.15 cm from SFJ. DVT of left distal femoral, popliteal, gastrocnemius, posterior tibial, peroneal veins.  - IVC filter placed, 7/20/2022, with Dr. Natasha Yeung.  - On [facility]. Will hold prior to the biopsy (the RN is doing this now). 3.) HTN      ONCOLOGIC DISPOSITION:  Would expect she will needs to stay for her path result and then start chemo.     Ghanshyam Mahan MD  Please contact through [facility]

## 2022-07-22 NOTE — CARE COORDINATION
Patient getting biopsy today for probable ovarian cancer per Oncology progress notes. Per oncology needs to stay for her pathology result and start chemo while inpatient. IPTA. Open to services if needed. Still on heparin gtt.

## 2022-07-22 NOTE — PROGRESS NOTES
Hospitalist Progress Note      PCP: Martha Grant MD    Date of Admission: 7/19/2022    Chief Complaint:   Abd discomfort    Hospital Course:      79 y.o. female who presented to [facility] with increasing shortness of breath. Patient recently diagnosed with ovarian cancer on 7/10/2022. Patient had presented to the emergency department with increasing abdominal distention. CAT scan shows metastatic ovarian CA. Patient was scheduled to see Dr. Adam Rodney in [facility] tomorrow. Over the past week patient with progressive shortness of breath. She states yesterday it became severe. She denies history of COPD. Remote tobacco history. No oxygen use at home. Patient has been using morphine for pain control. Abdominal distention secondary to ascites per previous CT scan. Patient denies any swelling in her legs although she has had some discomfort in her upper thighs. Her last BM was this morning no blood or melena noted. Subjective:   Patient doing okay. She has a CT-guided biopsy planned for today. Patient denies any chest pain. Her appetite is still decreased. Some abdominal distention persist.  Patient denies increase in shortness of breath but she still requiring oxygen.     Medications:  Reviewed    Infusion Medications    sodium chloride 50 mL/hr at 07/21/22 0831    sodium chloride       Scheduled Medications    [Held by provider] aspirin  81 mg Oral Daily    Coenzyme Q10  10 mg Oral Daily    fluticasone  1 spray Each Nostril Daily    atorvastatin  10 mg Oral Daily    therapeutic multivitamin-minerals  1 tablet Oral Daily    pantoprazole  40 mg Oral QAM AC    sennosides-docusate sodium  2 tablet Oral Daily    sertraline  25 mg Oral Daily    sodium chloride flush  5-40 mL IntraVENous 2 times per day    midodrine  10 mg Oral TID WC    polyethylene glycol  17 g Oral Daily     PRN Meds: albuterol, heparin (porcine), heparin (porcine), morphine, sodium chloride flush, sodium chloride, ondansetron **OR** ondansetron, acetaminophen **OR** acetaminophen, perflutren lipid microspheres, senna      Intake/Output Summary (Last 24 hours) at 7/22/2022 1543  Last data filed at 7/22/2022 0458  Gross per 24 hour   Intake 120 ml   Output 700 ml   Net -580 ml         Physical Exam Performed:    BP (!) 157/87   Pulse 85   Temp 97.9 °F (36.6 °C) (Oral)   Resp 22   Ht 5' 4\" (1.626 m)   Wt 209 lb 3.2 oz (94.9 kg)   LMP  (LMP Unknown)   SpO2 97%   BMI 35.91 kg/m²     General appearance: No acute distress, appears stated age and cooperative. HEENT: Pupils equal, round, and reactive to light. Conjunctivae/corneas clear. Neck: Supple, with full range of motion. No jugular venous distention. Trachea midline. Respiratory:  Normal respiratory effort. Clear to auscultation, bilaterally without Rales/Wheezes/Rhonchi. Cardiovascular: Regular rate and rhythm with normal S1/S2 without murmurs, rubs or gallops. Abdomen: Distended but soft. Musculoskeletal: No clubbing, cyanosis. Edema bilaterally LE. Full range of motion without deformity. Skin: Skin color, texture, turgor normal.  No rashes or lesions. Neurologic:  Neurovascularly intact without any focal sensory/motor deficits.  Cranial nerves: II-XII intact, grossly non-focal.  Psychiatric: Alert and oriented, thought content appropriate, normal insight  Capillary Refill: Brisk,3 seconds, normal   Peripheral Pulses: +2 palpable, equal bilaterally       Labs:   Recent Labs     07/20/22  0518 07/22/22  0808   WBC 10.9 8.2   HGB 10.6* 10.5*   HCT 31.5* 31.0*    257       Recent Labs     07/20/22  0518 07/21/22  0511   * 130*   K 5.2* 5.1   CL 96* 96*   CO2 20* 20*   BUN 33* 30*   CREATININE 1.0 0.7   CALCIUM 10.0 9.7       Recent Labs     07/20/22  0518   AST 83*   ALT 43*   BILIDIR <0.2   BILITOT 0.4   ALKPHOS 54       Recent Labs     07/22/22  0808   INR 1.04       Recent Labs     07/19/22  1603 07/19/22  2140   TROPONINI 0.29* 0.27*         Urinalysis: Lab Results   Component Value Date/Time    NITRU Negative 07/19/2022 02:52 PM    [address] 07/19/2022 02:52 PM    BACTERIA Rare 07/19/2022 02:52 PM    RBCUA 5-10 07/19/2022 02:52 PM    BLOODU Negative 07/19/2022 02:52 PM    SPECGRAV 1.010 07/19/2022 02:52 PM    GLUCOSEU Negative 07/19/2022 02:52 PM       Radiology:  VL Extremity Venous Bilateral         CT CHEST PULMONARY EMBOLISM W CONTRAST   Final Result   Chest:      Bilateral pulmonary emboli, left greater than right, with bilateral pleural   effusions, left greater than right and adjacent consolidation of the lungs,   likely atelectasis. There is mild dilatation of the right heart, IVC and   hepatic veins suggesting a component of right heart insufficiency. Results   discussed with Cameron Cueto by Meri Sandhu. Migdalia Mccartney MD at 8:52 Am on   7/19/2022      Abdomen and pelvis:      Abdominal and pelvic ascites with findings of peritoneal carcinomatosis,   presumably due to ovarian carcinoma due to the nodular and irregular   appearance of the ovaries. CT ABDOMEN PELVIS W IV CONTRAST Additional Contrast? None   Final Result   Chest:      Bilateral pulmonary emboli, left greater than right, with bilateral pleural   effusions, left greater than right and adjacent consolidation of the lungs,   likely atelectasis. There is mild dilatation of the right heart, IVC and   hepatic veins suggesting a component of right heart insufficiency. Results   discussed with Cameron Cueto by Meri Sandhu. Migdalia Mccartney MD at 8:52 Am on   7/19/2022      Abdomen and pelvis:      Abdominal and pelvic ascites with findings of peritoneal carcinomatosis,   presumably due to ovarian carcinoma due to the nodular and irregular   appearance of the ovaries. XR CHEST PORTABLE   Final Result   No acute process.       Bibasilar hypoaeration         CT BIOPSY ABDOMEN RETROPERITONEUM    (Results Pending)   CT GUIDED NEEDLE PLACEMENT    (Results Pending)           Assessment/Plan:    Active Hospital Problems    Diagnosis     Acute deep vein thrombosis (DVT) of proximal vein of both lower extremities (HCC) [I82.4Y3]      Priority: Medium    Acute pulmonary embolism ([address] [I26.99]      Priority: Medium     1. Bilateral pulmonary emboli. Pulmonary following. Heme-onc following. Patient placed on heparin drip. Lower extremity Dopplers positive for DVT. Vascular surgery placed IVC filter. Transition to oral anticoagulant after biopsy. 2.  Elevated troponin. Trending down. Secondary to PE per cardiology. 3.  Ovarian cancer. Heme-onc following. Gyn/Onc consulted.  >[id number]. Plan for CT-guided mesenteric biopsy  4. Hypertension. Blood pressure improved. Will monitor. DVT Prophylaxis: Heparin drip  Diet: Diet NPO Exceptions are: Sips of Water with Meds, Ice Chips  Code Status: Full Code    PT/OT Eval Status: Pending    Dispo - Home when stable 2-3 days.     Blanche Cage MD

## 2022-07-22 NOTE — PLAN OF CARE
Problem: Discharge Planning  Goal: Discharge to home or other facility with appropriate resources  7/21/2022 2056 by Kay Crisostomo RN  Outcome: Progressing

## 2022-07-23 LAB
ANION GAP SERPL CALCULATED.3IONS-SCNC: 11 MMOL/L (ref 3–16)
ANTI-XA UNFRAC HEPARIN: 0.28 IU/ML (ref 0.3–0.7)
ANTI-XA UNFRAC HEPARIN: 0.34 IU/ML (ref 0.3–0.7)
ANTI-XA UNFRAC HEPARIN: 0.42 IU/ML (ref 0.3–0.7)
ANTI-XA UNFRAC HEPARIN: >1.1 IU/ML (ref 0.3–0.7)
BUN BLDV-MCNC: 14 MG/DL (ref 7–20)
CALCIUM SERPL-MCNC: 8.9 MG/DL (ref 8.3–10.6)
CHLORIDE BLD-SCNC: 95 MMOL/L (ref 99–110)
CO2: 25 MMOL/L (ref 21–32)
CREAT SERPL-MCNC: 0.6 MG/DL (ref 0.6–1.2)
GFR AFRICAN AMERICAN: >60
GFR NON-AFRICAN AMERICAN: >60
GLUCOSE BLD-MCNC: 144 MG/DL (ref 70–99)
POTASSIUM SERPL-SCNC: 4.7 MMOL/L (ref 3.5–5.1)
SODIUM BLD-SCNC: 131 MMOL/L (ref 136–145)

## 2022-07-23 PROCEDURE — 85520 HEPARIN ASSAY: CPT

## 2022-07-23 PROCEDURE — 94640 AIRWAY INHALATION TREATMENT: CPT

## 2022-07-23 PROCEDURE — 2060000000 HC ICU INTERMEDIATE R&B

## 2022-07-23 PROCEDURE — 94761 N-INVAS EAR/PLS OXIMETRY MLT: CPT

## 2022-07-23 PROCEDURE — 6370000000 HC RX 637 (ALT 250 FOR IP): Performed by: INTERNAL MEDICINE

## 2022-07-23 PROCEDURE — 6360000002 HC RX W HCPCS: Performed by: INTERNAL MEDICINE

## 2022-07-23 PROCEDURE — 80048 BASIC METABOLIC PNL TOTAL CA: CPT

## 2022-07-23 PROCEDURE — 36415 COLL VENOUS BLD VENIPUNCTURE: CPT

## 2022-07-23 PROCEDURE — 99233 SBSQ HOSP IP/OBS HIGH 50: CPT | Performed by: INTERNAL MEDICINE

## 2022-07-23 PROCEDURE — 6370000000 HC RX 637 (ALT 250 FOR IP): Performed by: HOSPITALIST

## 2022-07-23 PROCEDURE — 2700000000 HC OXYGEN THERAPY PER DAY

## 2022-07-23 RX ORDER — HEPARIN SODIUM 1000 [USP'U]/ML
2600 INJECTION, SOLUTION INTRAVENOUS; SUBCUTANEOUS ONCE
Status: COMPLETED | OUTPATIENT
Start: 2022-07-23 | End: 2022-07-23

## 2022-07-23 RX ADMIN — MORPHINE SULFATE 15 MG: 15 TABLET ORAL at 17:35

## 2022-07-23 RX ADMIN — ATORVASTATIN CALCIUM 10 MG: 10 TABLET, FILM COATED ORAL at 22:30

## 2022-07-23 RX ADMIN — MORPHINE SULFATE 15 MG: 15 TABLET ORAL at 10:55

## 2022-07-23 RX ADMIN — HEPARIN SODIUM 2600 UNITS: 1000 INJECTION INTRAVENOUS; SUBCUTANEOUS at 04:38

## 2022-07-23 RX ADMIN — Medication 1 TABLET: at 09:46

## 2022-07-23 RX ADMIN — HEPARIN SODIUM 1960 UNITS/HR: 10000 INJECTION, SOLUTION INTRAVENOUS at 10:57

## 2022-07-23 RX ADMIN — ALBUTEROL SULFATE 2.5 MG: 2.5 SOLUTION RESPIRATORY (INHALATION) at 18:10

## 2022-07-23 RX ADMIN — MIDODRINE HYDROCHLORIDE 10 MG: 5 TABLET ORAL at 12:12

## 2022-07-23 RX ADMIN — MIDODRINE HYDROCHLORIDE 10 MG: 5 TABLET ORAL at 17:35

## 2022-07-23 RX ADMIN — SERTRALINE 25 MG: 50 TABLET, FILM COATED ORAL at 09:46

## 2022-07-23 RX ADMIN — MORPHINE SULFATE 15 MG: 15 TABLET ORAL at 22:30

## 2022-07-23 RX ADMIN — ALBUTEROL SULFATE 2.5 MG: 2.5 SOLUTION RESPIRATORY (INHALATION) at 08:46

## 2022-07-23 RX ADMIN — PANTOPRAZOLE SODIUM 40 MG: 40 TABLET, DELAYED RELEASE ORAL at 05:59

## 2022-07-23 RX ADMIN — MORPHINE SULFATE 15 MG: 15 TABLET ORAL at 03:16

## 2022-07-23 RX ADMIN — MIDODRINE HYDROCHLORIDE 10 MG: 5 TABLET ORAL at 08:38

## 2022-07-23 ASSESSMENT — PAIN SCALES - GENERAL
PAINLEVEL_OUTOF10: 7
PAINLEVEL_OUTOF10: 0
PAINLEVEL_OUTOF10: 8
PAINLEVEL_OUTOF10: 7
PAINLEVEL_OUTOF10: 8
PAINLEVEL_OUTOF10: 0
PAINLEVEL_OUTOF10: 7
PAINLEVEL_OUTOF10: 0

## 2022-07-23 ASSESSMENT — PAIN DESCRIPTION - DESCRIPTORS
DESCRIPTORS: ACHING
DESCRIPTORS: ACHING;DISCOMFORT

## 2022-07-23 ASSESSMENT — PAIN DESCRIPTION - LOCATION
LOCATION: ABDOMEN
LOCATION: ABDOMEN

## 2022-07-23 ASSESSMENT — PAIN DESCRIPTION - ORIENTATION
ORIENTATION: RIGHT
ORIENTATION: RIGHT

## 2022-07-23 ASSESSMENT — PAIN DESCRIPTION - ONSET: ONSET: ON-GOING

## 2022-07-23 NOTE — PROGRESS NOTES
Nephrology Note                                                                                                                                                                                                                                                                                                                                                               Office : [phone]     Fax :[phone]              Patient's Name: Aristides Pope  1:56 PM  7/23/2022    Reason for Consult: CHICHI    Requesting Physician:  Stef Resendez MD      Chief Complaint:        Interval History :    CHICHI improved    Na 130 ---> 131   For CT-guided biopsy of the mesentery. History of Present Ilness:    Aristides Pope is a 79 y.o. female with PMH of HTN , recent diagnosis of probable ovarian Ca    C/w SOB    CT chest with contrast s/o PE BL    Nephrology consult for CHICHI management      Poor po intake of fluids +  Loose stools on Miralax +  NSAIDs +      Past Medical History:   Diagnosis Date    Anxiety     Ascites     DVT (deep venous thrombosis) (HCC)     Hypertension     Ovarian ca ([facility] [address] 07/10/2022    mets    Pulmonary embolism ([facility] [address] 07/19/2022    bilateral       Past Surgical History:   Procedure Laterality Date    COLONOSCOPY  9-8-14    colon polyp removed, diverticulosis    CT BIOPSY ABDOMEN RETROPERITONEUM  7/22/2022    CT BIOPSY ABDOMEN RETROPERITONEUM 7/22/2022 Jeimy Poole MD [facility] CT SCAN       Family History   Problem Relation Age of Onset    Cancer Mother         Pancreatic Cancer    Diabetes Mother     High Blood Pressure Mother     Breast Cancer Sister     Breast Cancer Maternal Aunt     Breast Cancer Maternal Cousin         reports that she quit smoking about 26 years ago. Her smoking use included cigarettes. She has a 10.00 pack-year smoking history. She has never used smokeless tobacco. She reports current alcohol use of about 2.0 standard drinks per week.  She reports that she does not use drugs. Allergies:  Patient has no known allergies.     Current Medications:    heparin 25,000 units in dextrose 5% 250 mL (premix) infusion, Continuous  albuterol (PROVENTIL) nebulizer solution 2.5 mg, Q6H PRN  heparin (porcine) injection 5,200 Units, PRN  heparin (porcine) injection 2,600 Units, PRN  [Held by provider] aspirin chewable tablet 81 mg, Daily  Coenzyme Q10 CAPS 10 mg (Patient Supplied), Daily  fluticasone (FLONASE) 50 MCG/ACT nasal spray 1 spray, Daily  atorvastatin (LIPITOR) tablet 10 mg, Daily  morphine (MSIR) tablet 15 mg, Q4H PRN  therapeutic multivitamin-minerals 1 tablet, Daily  pantoprazole (PROTONIX) tablet 40 mg, QAM AC  sennosides-docusate sodium (SENOKOT-S) 8.6-50 MG tablet 2 tablet, Daily  sertraline (ZOLOFT) tablet 25 mg, Daily  sodium chloride flush 0.9 % injection 5-40 mL, 2 times per day  sodium chloride flush 0.9 % injection 5-40 mL, PRN  0.9 % sodium chloride infusion, PRN  ondansetron (ZOFRAN-ODT) disintegrating tablet 4 mg, Q8H PRN   Or  ondansetron (ZOFRAN) injection 4 mg, Q6H PRN  acetaminophen (TYLENOL) tablet 650 mg, Q6H PRN   Or  acetaminophen (TYLENOL) suppository 650 mg, Q6H PRN  perflutren lipid microspheres (DEFINITY) injection 1.65 mg, ONCE PRN  senna (SENOKOT) tablet 8.6 mg, BID PRN  midodrine (PROAMATINE) tablet 10 mg, TID WC  polyethylene glycol (GLYCOLAX) packet 17 g, Daily      Review of Systems:   14 point ROS obtained but were negative except mentioned in HPI      Physical exam:     Vitals:  /84   Pulse 88   Temp 98 °F (36.7 °C) (Oral)   Resp 20   Ht 5' 4\" (1.626 m)   Wt 204 lb 2.3 oz (92.6 kg)   LMP  (LMP Unknown)   SpO2 96%   BMI 35.04 kg/m²   Constitutional:  OAA X3 NAD  Skin: no rash, turgor wnl  Heent:  eomi, mmm  Neck: no bruits or jvd noted  Cardiovascular:  S1, S2 without m/r/g  Respiratory: CTA B without w/r/r  Abdomen:  +bs, soft, nt, nd  Ext:  no lower extremity edema  Psychiatric: mood and affect appropriate  Musculoskeletal:  Rom, muscular strength intact    Data:   Labs:  CBC:   Recent Labs     07/22/22  0808   WBC 8.2   HGB 10.5*        BMP:    Recent Labs     07/21/22  0511 07/23/22  1044   * 131*   K 5.1 4.7   CL 96* 95*   CO2 20* 25   BUN 30* 14   CREATININE 0.7 0.6   GLUCOSE 117* 144*     Ca/Mg/Phos:   Recent Labs     07/21/22  0511 07/23/22  1044   CALCIUM 9.7 8.9     Hepatic:   No results for input(s): AST, ALT, ALB, BILITOT, ALKPHOS in the last 72 hours. Troponin:   No results for input(s): TROPONINI in the last 72 hours. BNP: No results for input(s): BNP in the last 72 hours. Lipids: No results for input(s): CHOL, TRIG, HDL, LDLCALC, LABVLDL in the last 72 hours. ABGs: No results for input(s): PHART, PO2ART, LGF0SZT in the last 72 hours. INR:   Recent Labs     07/22/22  0808   INR 1.04     UA:  No results for input(s): Thelma Gables, GLUCOSEU, BILIRUBINUR, KETUA, SPECGRAV, BLOODU, PHUR, PROTEINU, UROBILINOGEN, NITRU, LEUKOCYTESUR, Gurjit Blind in the last 72 hours. Urine Microscopic:   No results for input(s): LABCAST, BACTERIA, COMU, HYALCAST, WBCUA, RBCUA, EPIU in the last 72 hours. Urine Culture: No results for input(s): LABURIN in the last 72 hours. Urine Chemistry:   No results for input(s): Dow Carton, PROTEINUR, NAUR in the last 72 hours. IMAGING:  CT BIOPSY ABDOMEN RETROPERITONEUM   Final Result   Successful CT guided core biopsy of mesenteric nodularity. CT GUIDED NEEDLE PLACEMENT   Final Result   Successful CT guided core biopsy of mesenteric nodularity. VL Extremity Venous Bilateral   Final Result      CT CHEST PULMONARY EMBOLISM W CONTRAST   Final Result   Chest:      Bilateral pulmonary emboli, left greater than right, with bilateral pleural   effusions, left greater than right and adjacent consolidation of the lungs,   likely atelectasis. There is mild dilatation of the right heart, IVC and   hepatic veins suggesting a component of right heart insufficiency. Results   discussed with [facility] NOLVIA [place] by Torri Miller. Annie Mckenzie MD at 8:52 Am on   7/19/2022      Abdomen and pelvis:      Abdominal and pelvic ascites with findings of peritoneal carcinomatosis,   presumably due to ovarian carcinoma due to the nodular and irregular   appearance of the ovaries. CT ABDOMEN PELVIS W IV CONTRAST Additional Contrast? None   Final Result   Chest:      Bilateral pulmonary emboli, left greater than right, with bilateral pleural   effusions, left greater than right and adjacent consolidation of the lungs,   likely atelectasis. There is mild dilatation of the right heart, IVC and   hepatic veins suggesting a component of right heart insufficiency. Results   discussed with [facility] NOLVIA Cueto by Torri Miller. Annie Mckenzie MD at 8:52 Am on   7/19/2022      Abdomen and pelvis:      Abdominal and pelvic ascites with findings of peritoneal carcinomatosis,   presumably due to ovarian carcinoma due to the nodular and irregular   appearance of the ovaries. XR CHEST PORTABLE   Final Result   No acute process.       Bibasilar hypoaeration             Assessment/Plan     Acute renal failure on CKD 3a    Etiology could be RAAS blockade from ARB , intravascular volume depletion from poor po intake , NSAIDs exposure     Component of ATN from hypotension     UA : protein trace    Urine Na < 20     Plan       Hold ACEI or ARB    CHICHI Improved    Gentle IV hydration with NS    Avoid NSAIDs  Avoid contrast  Avoid hypotension   Keep MAP > 65 mmhg    Continue Midodrine TID      Hyponatremia :    127 ----> 130 ---> 131     DC  IV hydration        Hyperkalemia   Low K diet        PE bilateral  On heparin drip      Probable ovarian ca  Management per oncology       HTN  Hold BP meds  Continue Midodrine              Thank you for allowing us to participate in care of Lakisha Gutierrez MD  Feel free to contact me   [facility] [address]  Office : [phone]  Fax :[phone]

## 2022-07-23 NOTE — FLOWSHEET NOTE
07/23/22 1434   Encounter Summary   Encounter Overview/Reason  Spiritual/Emotional Needs   Service Provided For: Patient and family together   Referral/Consult From: Other    Support System Family members;Friends/neighbors   Last Encounter  07/23/22  (Pt not feeling well today, waiting for test result)   Complexity of Encounter Low   Begin Time 1420   End Time  1434   Total Time Calculated 14 min   Spiritual/Emotional needs   Type Spiritual Support   Assessment/Intervention/Outcome   Assessment Concerns with suffering; Hopeful   Intervention Active listening;Discussed illness injury and its impact; Explored/Affirmed feelings, thoughts, concerns   Outcome Connection/Belonging;Encouraged;Engaged in conversation;Expressed Gratitude

## 2022-07-23 NOTE — PROGRESS NOTES
Hospitalist Progress Note      PCP: Stacey Cruz MD    Date of Admission: 7/19/2022    Chief Complaint:   Abd discomfort    Hospital Course:      79 y.o. female who presented to [facility] with increasing shortness of breath. Patient recently diagnosed with ovarian cancer on 7/10/2022. Patient had presented to the emergency department with increasing abdominal distention. CAT scan shows metastatic ovarian CA. Patient was scheduled to see Dr. Dana Young in [facility] tomorrow. Over the past week patient with progressive shortness of breath. She states yesterday it became severe. She denies history of COPD. Remote tobacco history. No oxygen use at home. Patient has been using morphine for pain control. Abdominal distention secondary to ascites per previous CT scan. Patient denies any swelling in her legs although she has had some discomfort in her upper thighs. Her last BM was this morning no blood or melena noted. Subjective:   Patient with decreased appetite. Status post CT guided biopsy. Patient remains on heparin. She denies any increase in shortness of breath. No nausea or vomiting.     Medications:  Reviewed    Infusion Medications    heparin (PORCINE) Infusion 1,960 Units/hr (07/23/22 1057)    sodium chloride       Scheduled Medications    [Held by provider] aspirin  81 mg Oral Daily    Coenzyme Q10  10 mg Oral Daily    fluticasone  1 spray Each Nostril Daily    atorvastatin  10 mg Oral Daily    therapeutic multivitamin-minerals  1 tablet Oral Daily    pantoprazole  40 mg Oral QAM AC    sennosides-docusate sodium  2 tablet Oral Daily    sertraline  25 mg Oral Daily    sodium chloride flush  5-40 mL IntraVENous 2 times per day    midodrine  10 mg Oral TID WC    polyethylene glycol  17 g Oral Daily     PRN Meds: albuterol, heparin (porcine), heparin (porcine), morphine, sodium chloride flush, sodium chloride, ondansetron **OR** ondansetron, acetaminophen **OR** acetaminophen, perflutren lipid microspheres, senna      Intake/Output Summary (Last 24 hours) at 7/23/2022 1404  Last data filed at 7/23/2022 1357  Gross per 24 hour   Intake 480 ml   Output 500 ml   Net -20 ml         Physical Exam Performed:    /84   Pulse 88   Temp 98 °F (36.7 °C) (Oral)   Resp 20   Ht 5' 4\" (1.626 m)   Wt 204 lb 2.3 oz (92.6 kg)   LMP  (LMP Unknown)   SpO2 96%   BMI 35.04 kg/m²     General appearance: No acute distress, appears stated age and cooperative. HEENT: Pupils equal, round, and reactive to light. Conjunctivae/corneas clear. Neck: Supple, with full range of motion. No jugular venous distention. Trachea midline. Respiratory:  Normal respiratory effort. Clear to auscultation, bilaterally without Rales/Wheezes/Rhonchi. Cardiovascular: Regular rate and rhythm with normal S1/S2 without murmurs, rubs or gallops. Abdomen: Distended but soft. Musculoskeletal: No clubbing, cyanosis. Edema bilaterally LE. Full range of motion without deformity. Skin: Skin color, texture, turgor normal.  No rashes or lesions. Neurologic:  Neurovascularly intact without any focal sensory/motor deficits. Cranial nerves: II-XII intact, grossly non-focal.  Psychiatric: Alert and oriented, thought content appropriate, normal insight  Capillary Refill: Brisk,3 seconds, normal   Peripheral Pulses: +2 palpable, equal bilaterally       Labs:   Recent Labs     07/22/22  0808   WBC 8.2   HGB 10.5*   HCT 31.0*          Recent Labs     07/21/22  0511 07/23/22  1044   * 131*   K 5.1 4.7   CL 96* 95*   CO2 20* 25   BUN 30* 14   CREATININE 0.7 0.6   CALCIUM 9.7 8.9       No results for input(s): AST, ALT, BILIDIR, BILITOT, ALKPHOS in the last 72 hours. Recent Labs     07/22/22  0808   INR 1.04       No results for input(s): Satinder Shackle in the last 72 hours.       Urinalysis:      Lab Results   Component Value Date/Time    NITRU Negative 07/19/2022 02:52 PM    WBCUA 3-5 07/19/2022 02:52 PM    BACTERIA Rare 07/19/2022 02:52 PM    RBCUA 5-10 07/19/2022 02:52 PM    BLOODU Negative 07/19/2022 02:52 PM    SPECGRAV 1.010 07/19/2022 02:52 PM    GLUCOSEU Negative 07/19/2022 02:52 PM       Radiology:  CT BIOPSY ABDOMEN RETROPERITONEUM   Final Result   Successful CT guided core biopsy of mesenteric nodularity. CT GUIDED NEEDLE PLACEMENT   Final Result   Successful CT guided core biopsy of mesenteric nodularity. VL Extremity Venous Bilateral   Final Result      CT CHEST PULMONARY EMBOLISM W CONTRAST   Final Result   Chest:      Bilateral pulmonary emboli, left greater than right, with bilateral pleural   effusions, left greater than right and adjacent consolidation of the lungs,   likely atelectasis. There is mild dilatation of the right heart, IVC and   hepatic veins suggesting a component of right heart insufficiency. Results   discussed with Cameron Cueto by Fernando Mendieta. Janay Bruno MD at 8:52 Am on   7/19/2022      Abdomen and pelvis:      Abdominal and pelvic ascites with findings of peritoneal carcinomatosis,   presumably due to ovarian carcinoma due to the nodular and irregular   appearance of the ovaries. CT ABDOMEN PELVIS W IV CONTRAST Additional Contrast? None   Final Result   Chest:      Bilateral pulmonary emboli, left greater than right, with bilateral pleural   effusions, left greater than right and adjacent consolidation of the lungs,   likely atelectasis. There is mild dilatation of the right heart, IVC and   hepatic veins suggesting a component of right heart insufficiency. Results   discussed with Cameron Cueto by Fernando Mendieta. Janay Bruno MD at 8:52 Am on   7/19/2022      Abdomen and pelvis:      Abdominal and pelvic ascites with findings of peritoneal carcinomatosis,   presumably due to ovarian carcinoma due to the nodular and irregular   appearance of the ovaries. XR CHEST PORTABLE   Final Result   No acute process.       Bibasilar hypoaeration Assessment/Plan:    Active Hospital Problems    Diagnosis     Acute deep vein thrombosis (DVT) of proximal vein of both lower extremities (HCC) [I82.4Y3]      Priority: Medium    Acute pulmonary embolism ([address] [I26.99]      Priority: Medium     1. Bilateral pulmonary emboli. Pulmonary following. Heme-onc following. Patient placed on heparin drip. Lower extremity Dopplers positive for DVT. Vascular surgery placed IVC filter. Transition to oral anticoagulant after port placement? 2.  Elevated troponin. Trending down. Secondary to PE per cardiology. 3.  Ovarian cancer. Heme-onc following. Gyn/Onc consulted.  >[id number]. Status post CT-guided mesenteric biopsy. Await pathology report. Patient may need to start treatment prior to discharge. 4.  Hypertension. Blood pressure improved. Will monitor. DVT Prophylaxis: Heparin drip  Diet: ADULT DIET; Regular; No Added Salt (3-4 gm)  ADULT ORAL NUTRITION SUPPLEMENT; Breakfast, Lunch, Dinner; Clear Liquid Oral Supplement  Code Status: Full Code    PT/OT Eval Status: Pending    Dispo - Home when stable 2-3 days.     Fab Tyler MD

## 2022-07-23 NOTE — ONCOLOGY
ONCOLOGY HEMATOLOGY CARE PROGRESS NOTE      SUBJECTIVE:     Biopsy is pending. She is stable. ROS:   The remaining 10 point review of symptoms is unremarkable. OBJECTIVE        Physical    VITALS:  /84   Pulse 88   Temp 98 °F (36.7 °C) (Oral)   Resp 20   Ht 5' 4\" (1.626 m)   Wt 204 lb 2.3 oz (92.6 kg)   LMP  (LMP Unknown)   SpO2 96%   BMI 35.04 kg/m²   TEMPERATURE:  Current - Temp: 98 °F (36.7 °C); Max - Temp  Av.3 °F (36.8 °C)  Min: 98 °F (36.7 °C)  Max: 98.7 °F (37.1 °C)  PULSE OXIMETRY RANGE: SpO2  Av.2 %  Min: 96 %  Max: 98 %  24HR INTAKE/OUTPUT:    Intake/Output Summary (Last 24 hours) at 2022 1338  Last data filed at 2022 4916  Gross per 24 hour   Intake 120 ml   Output --   Net 120 ml         CONSTITUTIONAL:  awake, alert, cooperative, no apparent distress, HEENT oral pharynx , no scleral icterus  HEMATOLOGIC/LYMPHATICS:  no cervical lymphadenopathy, no supraclavicular lymphadenopathy, no axillary lymphadenopathy and no inguinal lymphadenopathy  LUNGS:  No increased work of breathing, good air exchange, clear to auscultation bilaterally, no crackles or wheezing  CARDIOVASCULAR:  , regular rate and rhythm, normal S1 and S2, no S3 or S4, and no murmur noted  ABDOMEN:  No scars, normal bowel sounds, soft, non-distended, non-tender, no masses palpated, no hepatosplenomegally  MUSCULOSKELETAL:  There is no redness, warmth, or swelling of the joints. EXTREMETIES: No clubbing cynosis or edema  NEUROLOGIC:  Awake, alert, oriented to name, place and time. Cranial nerves II-XII are grossly intact. Motor is 5 out of 5 bilaterally.    SKIN:  no bruising or bleeding      Data      Recent Labs     22  0808   WBC 8.2   HGB 10.5*   HCT 31.0*      MCV 86.3          Recent Labs     22  0511 22  1044   * 131*   K 5.1 4.7   CL 96* 95*   CO2 20* 25   BUN 30* 14   CREATININE 0.7 0.6       No results for input(s): AST, ALT,

## 2022-07-24 LAB
ANION GAP SERPL CALCULATED.3IONS-SCNC: 11 MMOL/L (ref 3–16)
ANTI-XA UNFRAC HEPARIN: 0.33 IU/ML (ref 0.3–0.7)
BUN BLDV-MCNC: 11 MG/DL (ref 7–20)
CALCIUM SERPL-MCNC: 8.8 MG/DL (ref 8.3–10.6)
CHLORIDE BLD-SCNC: 98 MMOL/L (ref 99–110)
CO2: 20 MMOL/L (ref 21–32)
CREAT SERPL-MCNC: <0.5 MG/DL (ref 0.6–1.2)
GFR AFRICAN AMERICAN: >60
GFR NON-AFRICAN AMERICAN: >60
GLUCOSE BLD-MCNC: 116 MG/DL (ref 70–99)
POTASSIUM SERPL-SCNC: 5.2 MMOL/L (ref 3.5–5.1)
SODIUM BLD-SCNC: 129 MMOL/L (ref 136–145)

## 2022-07-24 PROCEDURE — 6370000000 HC RX 637 (ALT 250 FOR IP): Performed by: HOSPITALIST

## 2022-07-24 PROCEDURE — 6370000000 HC RX 637 (ALT 250 FOR IP): Performed by: INTERNAL MEDICINE

## 2022-07-24 PROCEDURE — 80048 BASIC METABOLIC PNL TOTAL CA: CPT

## 2022-07-24 PROCEDURE — 99233 SBSQ HOSP IP/OBS HIGH 50: CPT | Performed by: INTERNAL MEDICINE

## 2022-07-24 PROCEDURE — 2700000000 HC OXYGEN THERAPY PER DAY

## 2022-07-24 PROCEDURE — 6360000002 HC RX W HCPCS: Performed by: INTERNAL MEDICINE

## 2022-07-24 PROCEDURE — 2060000000 HC ICU INTERMEDIATE R&B

## 2022-07-24 PROCEDURE — 85520 HEPARIN ASSAY: CPT

## 2022-07-24 PROCEDURE — 36415 COLL VENOUS BLD VENIPUNCTURE: CPT

## 2022-07-24 PROCEDURE — 2580000003 HC RX 258: Performed by: INTERNAL MEDICINE

## 2022-07-24 PROCEDURE — 94761 N-INVAS EAR/PLS OXIMETRY MLT: CPT

## 2022-07-24 RX ORDER — SODIUM CHLORIDE 1000 MG
1 TABLET, SOLUBLE MISCELLANEOUS DAILY
Status: DISCONTINUED | OUTPATIENT
Start: 2022-07-24 | End: 2022-07-27

## 2022-07-24 RX ADMIN — SODIUM CHLORIDE, PRESERVATIVE FREE 10 ML: 5 INJECTION INTRAVENOUS at 20:32

## 2022-07-24 RX ADMIN — HEPARIN SODIUM 1960 UNITS/HR: 10000 INJECTION, SOLUTION INTRAVENOUS at 13:06

## 2022-07-24 RX ADMIN — MORPHINE SULFATE 15 MG: 15 TABLET ORAL at 14:26

## 2022-07-24 RX ADMIN — MORPHINE SULFATE 15 MG: 15 TABLET ORAL at 19:28

## 2022-07-24 RX ADMIN — Medication 1 G: at 10:07

## 2022-07-24 RX ADMIN — MIDODRINE HYDROCHLORIDE 10 MG: 5 TABLET ORAL at 15:43

## 2022-07-24 RX ADMIN — MIDODRINE HYDROCHLORIDE 10 MG: 5 TABLET ORAL at 13:03

## 2022-07-24 RX ADMIN — MORPHINE SULFATE 15 MG: 15 TABLET ORAL at 05:41

## 2022-07-24 RX ADMIN — SERTRALINE 25 MG: 50 TABLET, FILM COATED ORAL at 10:06

## 2022-07-24 RX ADMIN — HEPARIN SODIUM 1960 UNITS/HR: 10000 INJECTION, SOLUTION INTRAVENOUS at 00:06

## 2022-07-24 RX ADMIN — SODIUM ZIRCONIUM CYCLOSILICATE 5 G: 5 POWDER, FOR SUSPENSION ORAL at 13:16

## 2022-07-24 RX ADMIN — MORPHINE SULFATE 15 MG: 15 TABLET ORAL at 10:04

## 2022-07-24 RX ADMIN — MIDODRINE HYDROCHLORIDE 10 MG: 5 TABLET ORAL at 09:51

## 2022-07-24 RX ADMIN — ATORVASTATIN CALCIUM 10 MG: 10 TABLET, FILM COATED ORAL at 20:32

## 2022-07-24 RX ADMIN — Medication 1 TABLET: at 10:07

## 2022-07-24 RX ADMIN — PANTOPRAZOLE SODIUM 40 MG: 40 TABLET, DELAYED RELEASE ORAL at 05:41

## 2022-07-24 ASSESSMENT — PAIN DESCRIPTION - ORIENTATION
ORIENTATION: RIGHT

## 2022-07-24 ASSESSMENT — PAIN DESCRIPTION - LOCATION
LOCATION: ABDOMEN

## 2022-07-24 ASSESSMENT — PAIN SCALES - GENERAL
PAINLEVEL_OUTOF10: 9
PAINLEVEL_OUTOF10: 5
PAINLEVEL_OUTOF10: 8
PAINLEVEL_OUTOF10: 8
PAINLEVEL_OUTOF10: 4
PAINLEVEL_OUTOF10: 8
PAINLEVEL_OUTOF10: 8
PAINLEVEL_OUTOF10: 0
PAINLEVEL_OUTOF10: 9

## 2022-07-24 NOTE — PROGRESS NOTES
7/23  Anti-Xa = 0.34 at 1908. Continue heparin gtt at 1960 units/hr. Check Anti-Xa daily.   Erica Yuen, Maddison  7/23/2022 8:54 PM

## 2022-07-24 NOTE — PLAN OF CARE
Problem: Discharge Planning  Goal: Discharge to home or other facility with appropriate resources  Outcome: Progressing     Problem: Safety - Adult  Goal: Free from fall injury  Outcome: Progressing     Problem: Chronic Conditions and Co-morbidities  Goal: Patient's chronic conditions and co-morbidity symptoms are monitored and maintained or improved  Outcome: Progressing     Problem: Skin/Tissue Integrity  Goal: Absence of new skin breakdown  Description: 1. Monitor for areas of redness and/or skin breakdown  2. Assess vascular access sites hourly  3. Every 4-6 hours minimum:  Change oxygen saturation probe site  4. Every 4-6 hours:  If on nasal continuous positive airway pressure, respiratory therapy assess nares and determine need for appliance change or resting period.   Outcome: Progressing     Problem: Respiratory - Adult  Goal: Achieves optimal ventilation and oxygenation  Outcome: Progressing     Problem: Pain  Goal: Verbalizes/displays adequate comfort level or baseline comfort level  Outcome: Progressing  Flowsheets (Taken 7/23/2022 2132)  Verbalizes/displays adequate comfort level or baseline comfort level:   Encourage patient to monitor pain and request assistance   Assess pain using appropriate pain scale     Problem: ABCDS Injury Assessment  Goal: Absence of physical injury  Outcome: Progressing

## 2022-07-24 NOTE — PROGRESS NOTES
Hospitalist Progress Note      PCP: Hemalatha Shaikh MD    Date of Admission: 7/19/2022    Chief Complaint:   Abd discomfort    Hospital Course:      79 y.o. female who presented to [facility] with increasing shortness of breath. Patient recently diagnosed with ovarian cancer on 7/10/2022. Patient had presented to the emergency department with increasing abdominal distention. CAT scan shows metastatic ovarian CA. Patient was scheduled to see Dr. Eran Ceja in [facility] tomorrow. Over the past week patient with progressive shortness of breath. She states yesterday it became severe. She denies history of COPD. Remote tobacco history. No oxygen use at home. Patient has been using morphine for pain control. Abdominal distention secondary to ascites per previous CT scan. Patient denies any swelling in her legs although she has had some discomfort in her upper thighs. Her last BM was this morning no blood or melena noted. Subjective:   Patient unchanged. No increase in shortness of breath. Her appetite remains decreased. No significant pain in abdomen.     Medications:  Reviewed    Infusion Medications    heparin (PORCINE) Infusion 1,960 Units/hr (07/24/22 1306)    sodium chloride       Scheduled Medications    sodium chloride  1 g Oral Daily    [Held by provider] aspirin  81 mg Oral Daily    Coenzyme Q10  10 mg Oral Daily    fluticasone  1 spray Each Nostril Daily    atorvastatin  10 mg Oral Daily    therapeutic multivitamin-minerals  1 tablet Oral Daily    pantoprazole  40 mg Oral QAM AC    sennosides-docusate sodium  2 tablet Oral Daily    sertraline  25 mg Oral Daily    sodium chloride flush  5-40 mL IntraVENous 2 times per day    midodrine  10 mg Oral TID WC    polyethylene glycol  17 g Oral Daily     PRN Meds: albuterol, heparin (porcine), heparin (porcine), morphine, sodium chloride flush, sodium chloride, ondansetron **OR** ondansetron, acetaminophen **OR** acetaminophen, perflutren lipid microspheres, senna      Intake/Output Summary (Last 24 hours) at 7/24/2022 1459  Last data filed at 7/24/2022 1344  Gross per 24 hour   Intake 1185.39 ml   Output 600 ml   Net 585.39 ml         Physical Exam Performed:    /72   Pulse 93   Temp 98.3 °F (36.8 °C) (Oral)   Resp 16   Ht 5' 4\" (1.626 m)   Wt 204 lb 2.3 oz (92.6 kg)   LMP  (LMP Unknown)   SpO2 96%   BMI 35.04 kg/m²     General appearance: No acute distress, appears stated age and cooperative. HEENT: Pupils equal, round, and reactive to light. Conjunctivae/corneas clear. Neck: Supple, with full range of motion. No jugular venous distention. Trachea midline. Respiratory:  Normal respiratory effort. Clear to auscultation, bilaterally without Rales/Wheezes/Rhonchi. Cardiovascular: Regular rate and rhythm with normal S1/S2 without murmurs, rubs or gallops. Abdomen: Distended but soft. Nontender. Musculoskeletal: No clubbing, cyanosis. Edema bilaterally LE. Full range of motion without deformity. Skin: Skin color, texture, turgor normal.  No rashes or lesions. Neurologic:  Neurovascularly intact without any focal sensory/motor deficits. Cranial nerves: II-XII intact, grossly non-focal.  Psychiatric: Alert and oriented, thought content appropriate, normal insight  Capillary Refill: Brisk,3 seconds, normal   Peripheral Pulses: +2 palpable, equal bilaterally       Labs:   Recent Labs     07/22/22  0808   WBC 8.2   HGB 10.5*   HCT 31.0*          Recent Labs     07/23/22  1044 07/24/22  0501   * 129*   K 4.7 5.2*   CL 95* 98*   CO2 25 20*   BUN 14 11   CREATININE 0.6 <0.5*   CALCIUM 8.9 8.8       No results for input(s): AST, ALT, BILIDIR, BILITOT, ALKPHOS in the last 72 hours. Recent Labs     07/22/22  0808   INR 1.04       No results for input(s): Prentis Revels in the last 72 hours.       Urinalysis:      Lab Results   Component Value Date/Time    NITRU Negative 07/19/2022 02:52 PM    WBCUA 3-5 07/19/2022 02:52 PM BACTERIA Rare 07/19/2022 02:52 PM    RBCUA 5-10 07/19/2022 02:52 PM    BLOODU Negative 07/19/2022 02:52 PM    SPECGRAV 1.010 07/19/2022 02:52 PM    GLUCOSEU Negative 07/19/2022 02:52 PM       Radiology:  CT BIOPSY ABDOMEN RETROPERITONEUM   Final Result   Successful CT guided core biopsy of mesenteric nodularity. CT GUIDED NEEDLE PLACEMENT   Final Result   Successful CT guided core biopsy of mesenteric nodularity. VL Extremity Venous Bilateral   Final Result      CT CHEST PULMONARY EMBOLISM W CONTRAST   Final Result   Chest:      Bilateral pulmonary emboli, left greater than right, with bilateral pleural   effusions, left greater than right and adjacent consolidation of the lungs,   likely atelectasis. There is mild dilatation of the right heart, IVC and   hepatic veins suggesting a component of right heart insufficiency. Results   discussed with Cameron  Talia Cueto by Obdulio Clark. Johnny Fung MD at 8:52 Am on   7/19/2022      Abdomen and pelvis:      Abdominal and pelvic ascites with findings of peritoneal carcinomatosis,   presumably due to ovarian carcinoma due to the nodular and irregular   appearance of the ovaries. CT ABDOMEN PELVIS W IV CONTRAST Additional Contrast? None   Final Result   Chest:      Bilateral pulmonary emboli, left greater than right, with bilateral pleural   effusions, left greater than right and adjacent consolidation of the lungs,   likely atelectasis. There is mild dilatation of the right heart, IVC and   hepatic veins suggesting a component of right heart insufficiency. Results   discussed with Cameron Cueto by Obdulio Clark. Johnny Fung MD at 8:52 Am on   7/19/2022      Abdomen and pelvis:      Abdominal and pelvic ascites with findings of peritoneal carcinomatosis,   presumably due to ovarian carcinoma due to the nodular and irregular   appearance of the ovaries. XR CHEST PORTABLE   Final Result   No acute process.       Bibasilar hypoaeration Assessment/Plan:    Active Hospital Problems    Diagnosis     Acute deep vein thrombosis (DVT) of proximal vein of both lower extremities (HCC) [I82.4Y3]      Priority: Medium    Acute pulmonary embolism ([address] [I26.99]      Priority: Medium     1. Bilateral pulmonary emboli. Pulmonary following. Heme-onc following. Patient placed on heparin drip. Lower extremity Dopplers positive for DVT. Vascular surgery placed IVC filter. Transition to oral anticoagulant after port placement? 2.  Elevated troponin. Trending down. Secondary to PE per cardiology. 3.  Ovarian cancer. Heme-onc following. Gyn/Onc consulted.  >[id number]. Status post CT-guided mesenteric biopsy. Await pathology report. Patient may need to start treatment prior to discharge. 4.  Hypertension. Blood pressure improved. Will monitor. DVT Prophylaxis: Heparin drip  Diet: ADULT DIET; Regular; No Added Salt (3-4 gm)  ADULT ORAL NUTRITION SUPPLEMENT; Breakfast, Lunch, Dinner; Clear Liquid Oral Supplement  Code Status: Full Code    PT/OT Eval Status: Pending    Dispo - Home when stable 2-3 days.     Dorina Ortez MD

## 2022-07-24 NOTE — PROGRESS NOTES
Nephrology Note                                                                                                                                                                                                                                                                                                                                                               Office : [phone]     Fax :[phone]              Patient's Name: Lakisha Valencia  5:00 PM  7/24/2022    Reason for Consult: CHICHI    Requesting Physician:  Gurmeet Lyons MD      Chief Complaint:        Interval History :    CHICHI improved    Na 130 ---> 131 ----> 129   For CT-guided biopsy of the mesentery. History of Present Ilness:    Lakisha Valencia is a 79 y.o. female with PMH of HTN , recent diagnosis of probable ovarian Ca    C/w SOB    CT chest with contrast s/o PE BL    Nephrology consult for CHICHI management      Poor po intake of fluids +  Loose stools on Miralax +  NSAIDs +      Past Medical History:   Diagnosis Date    Anxiety     Ascites     DVT (deep venous thrombosis) (HCC)     Hypertension     Ovarian ca ([address] 07/10/2022    mets    Pulmonary embolism ([address] 07/19/2022    bilateral       Past Surgical History:   Procedure Laterality Date    COLONOSCOPY  9-8-14    colon polyp removed, diverticulosis    CT BIOPSY ABDOMEN RETROPERITONEUM  7/22/2022    CT BIOPSY ABDOMEN RETROPERITONEUM 7/22/2022 Ivone Paige MD [facility] CT SCAN       Family History   Problem Relation Age of Onset    Cancer Mother         Pancreatic Cancer    Diabetes Mother     High Blood Pressure Mother     Breast Cancer Sister     Breast Cancer Maternal Aunt     Breast Cancer Maternal Cousin         reports that she quit smoking about 26 years ago. Her smoking use included cigarettes. She has a 10.00 pack-year smoking history. She has never used smokeless tobacco. She reports current alcohol use of about 2.0 standard drinks per week.  She reports that she does not use drugs. Allergies:  Patient has no known allergies.     Current Medications:    sodium chloride tablet 1 g, Daily  heparin 25,000 units in dextrose 5% 250 mL (premix) infusion, Continuous  albuterol (PROVENTIL) nebulizer solution 2.5 mg, Q6H PRN  heparin (porcine) injection 5,200 Units, PRN  heparin (porcine) injection 2,600 Units, PRN  [Held by provider] aspirin chewable tablet 81 mg, Daily  Coenzyme Q10 CAPS 10 mg (Patient Supplied), Daily  fluticasone (FLONASE) 50 MCG/ACT nasal spray 1 spray, Daily  atorvastatin (LIPITOR) tablet 10 mg, Daily  morphine (MSIR) tablet 15 mg, Q4H PRN  therapeutic multivitamin-minerals 1 tablet, Daily  pantoprazole (PROTONIX) tablet 40 mg, QAM AC  sennosides-docusate sodium (SENOKOT-S) 8.6-50 MG tablet 2 tablet, Daily  sertraline (ZOLOFT) tablet 25 mg, Daily  sodium chloride flush 0.9 % injection 5-40 mL, 2 times per day  sodium chloride flush 0.9 % injection 5-40 mL, PRN  0.9 % sodium chloride infusion, PRN  ondansetron (ZOFRAN-ODT) disintegrating tablet 4 mg, Q8H PRN   Or  ondansetron (ZOFRAN) injection 4 mg, Q6H PRN  acetaminophen (TYLENOL) tablet 650 mg, Q6H PRN   Or  acetaminophen (TYLENOL) suppository 650 mg, Q6H PRN  perflutren lipid microspheres (DEFINITY) injection 1.65 mg, ONCE PRN  senna (SENOKOT) tablet 8.6 mg, BID PRN  midodrine (PROAMATINE) tablet 10 mg, TID WC  polyethylene glycol (GLYCOLAX) packet 17 g, Daily      Review of Systems:   14 point ROS obtained but were negative except mentioned in HPI      Physical exam:     Vitals:  /70   Pulse 88   Temp 97.7 °F (36.5 °C) (Oral)   Resp 18   Ht 5' 4\" (1.626 m)   Wt 204 lb 2.3 oz (92.6 kg)   LMP  (LMP Unknown)   SpO2 98%   BMI 35.04 kg/m²   Constitutional:  OAA X3 NAD  Skin: no rash, turgor wnl  Heent:  eomi, mmm  Neck: no bruits or jvd noted  Cardiovascular:  S1, S2 without m/r/g  Respiratory: CTA B without w/r/r  Abdomen:  +bs, soft, nt, nd  Ext:  no lower extremity edema  Psychiatric: mood and affect appropriate  Musculoskeletal:  Rom, muscular strength intact    Data:   Labs:  CBC:   Recent Labs     07/22/22  0808   WBC 8.2   HGB 10.5*        BMP:    Recent Labs     07/23/22  1044 07/24/22  0501   * 129*   K 4.7 5.2*   CL 95* 98*   CO2 25 20*   BUN 14 11   CREATININE 0.6 <0.5*   GLUCOSE 144* 116*     Ca/Mg/Phos:   Recent Labs     07/23/22  1044 07/24/22  0501   CALCIUM 8.9 8.8     Hepatic:   No results for input(s): AST, ALT, ALB, BILITOT, ALKPHOS in the last 72 hours. Troponin:   No results for input(s): TROPONINI in the last 72 hours. BNP: No results for input(s): BNP in the last 72 hours. Lipids: No results for input(s): CHOL, TRIG, HDL, LDLCALC, LABVLDL in the last 72 hours. ABGs: No results for input(s): PHART, PO2ART, AQK1ZCI in the last 72 hours. INR:   Recent Labs     07/22/22  0808   INR 1.04     UA:  No results for input(s): Jie Hasting, GLUCOSEU, BILIRUBINUR, KETUA, SPECGRAV, BLOODU, PHUR, PROTEINU, UROBILINOGEN, NITRU, LEUKOCYTESUR, Astudillo Alosa in the last 72 hours. Urine Microscopic:   No results for input(s): LABCAST, BACTERIA, COMU, HYALCAST, WBCUA, RBCUA, EPIU in the last 72 hours. Urine Culture: No results for input(s): LABURIN in the last 72 hours. Urine Chemistry:   No results for input(s): Capo Chalet, PROTEINUR, NAUR in the last 72 hours. IMAGING:  CT BIOPSY ABDOMEN RETROPERITONEUM   Final Result   Successful CT guided core biopsy of mesenteric nodularity. CT GUIDED NEEDLE PLACEMENT   Final Result   Successful CT guided core biopsy of mesenteric nodularity. VL Extremity Venous Bilateral   Final Result      CT CHEST PULMONARY EMBOLISM W CONTRAST   Final Result   Chest:      Bilateral pulmonary emboli, left greater than right, with bilateral pleural   effusions, left greater than right and adjacent consolidation of the lungs,   likely atelectasis.   There is mild dilatation of the right heart, IVC and   hepatic veins suggesting a component of right heart insufficiency. Results   discussed with Cameron Cueto by Alley Cesar. Jihan Ma MD at 8:52 Am on   7/19/2022      Abdomen and pelvis:      Abdominal and pelvic ascites with findings of peritoneal carcinomatosis,   presumably due to ovarian carcinoma due to the nodular and irregular   appearance of the ovaries. CT ABDOMEN PELVIS W IV CONTRAST Additional Contrast? None   Final Result   Chest:      Bilateral pulmonary emboli, left greater than right, with bilateral pleural   effusions, left greater than right and adjacent consolidation of the lungs,   likely atelectasis. There is mild dilatation of the right heart, IVC and   hepatic veins suggesting a component of right heart insufficiency. Results   discussed with Gab5 NOLVIA Cueto by Alley Cesar. Jihan Ma MD at 8:52 Am on   7/19/2022      Abdomen and pelvis:      Abdominal and pelvic ascites with findings of peritoneal carcinomatosis,   presumably due to ovarian carcinoma due to the nodular and irregular   appearance of the ovaries. XR CHEST PORTABLE   Final Result   No acute process.       Bibasilar hypoaeration             Assessment/Plan     Acute renal failure on CKD 3a    Etiology could be RAAS blockade from ARB , intravascular volume depletion from poor po intake , NSAIDs exposure     Component of ATN from hypotension     UA : protein trace    Urine Na < 20     Plan       Hold ACEI or ARB    CHICHI Improved    Gentle IV hydration with NS    Avoid NSAIDs  Avoid contrast  Avoid hypotension   Keep MAP > 65 mmhg    Continue Midodrine TID      Hyponatremia :    127 ----> 130 ---> 131 ---> 129     DC  IV hydration  Restrict daily water intake to 1200 ml       Hyperkalemia   Low K diet        PE bilateral  On heparin drip      Probable ovarian ca  Management per oncology       HTN  Hold BP meds  Continue Midodrine              Thank you for allowing us to participate in care of Edel Cornell MD  Feel free to contact me   [facility] [address]  Office : [phone]  Fax :[phone]

## 2022-07-24 NOTE — ONCOLOGY
BUN 14 11   CREATININE 0.6 <0.5*       No results for input(s): AST, ALT, ALB, BILIDIR, BILITOT, ALKPHOS in the last 72 hours. Magnesium:    Lab Results   Component Value Date/Time    MG 2.10 07/07/2022 09:28 AM         Problem List  Patient Active Problem List   Diagnosis    HTN (hypertension)    HLD (hyperlipidemia)    Low back pain    Benign neoplasm of colon    Swelling, mass, or lump in head and neck    Chronic sinusitis    BERNIE (generalized anxiety disorder)    Major depressive disorder with single episode, in full remission ([facility]    Generalized abdominal pain    Adnexal mass    Peritoneal carcinomatosis (HCC)    Malignant ascites    Anorexia    Reactive depression    Acute pulmonary embolism (HCC)    Acute deep vein thrombosis (DVT) of proximal vein of both lower extremities (HCC)       ASSESSMENT AND PLAN  1.) Probable ovarian cancer  -CA-125 80090 U/mL. -Status post mesenteric biopsy  -Biopsy results are pending     2.) PE  - CTPA, 7/19/2022, showed bilateral PEs left greater than right.  - Duplex ultrasound of the BLE, 7/19/2022, showed SVT of right greater saphenous vein extending to 1.15 cm from SFJ. DVT of left distal femoral, popliteal, gastrocnemius, posterior tibial, peroneal veins.  - IVC filter placed, 7/20/2022, with Dr. Jesse Lane. 3.) HTN      ONCOLOGIC DISPOSITION:  Would expect she will needs to stay for her path result and then start chemo.     Vaishali Castanon MD  May be reached through [address] [place] [address]

## 2022-07-25 ENCOUNTER — APPOINTMENT (OUTPATIENT)
Dept: INTERVENTIONAL RADIOLOGY/VASCULAR | Age: 67
DRG: 754 | End: 2022-07-25
Payer: MEDICARE

## 2022-07-25 ENCOUNTER — APPOINTMENT (OUTPATIENT)
Dept: ULTRASOUND IMAGING | Age: 67
DRG: 754 | End: 2022-07-25
Payer: MEDICARE

## 2022-07-25 PROBLEM — I82.412 ACUTE DEEP VEIN THROMBOSIS (DVT) OF FEMORAL VEIN OF LEFT LOWER EXTREMITY (HCC): Status: ACTIVE | Noted: 2022-07-25

## 2022-07-25 PROBLEM — C56.9 OVARIAN CANCER (HCC): Status: ACTIVE | Noted: 2022-07-25

## 2022-07-25 PROBLEM — D64.9 NORMOCYTIC NORMOCHROMIC ANEMIA: Status: ACTIVE | Noted: 2022-07-25

## 2022-07-25 PROBLEM — E87.1 HYPONATREMIA: Status: ACTIVE | Noted: 2022-07-25

## 2022-07-25 PROBLEM — I82.412 ACUTE DEEP VEIN THROMBOSIS (DVT) OF FEMORAL VEIN OF LEFT LOWER EXTREMITY (HCC): Status: RESOLVED | Noted: 2022-07-25 | Resolved: 2022-07-25

## 2022-07-25 LAB
ANION GAP SERPL CALCULATED.3IONS-SCNC: 10 MMOL/L (ref 3–16)
ANTI-XA UNFRAC HEPARIN: 0.32 IU/ML (ref 0.3–0.7)
ANTI-XA UNFRAC HEPARIN: 0.48 IU/ML (ref 0.3–0.7)
BUN BLDV-MCNC: 10 MG/DL (ref 7–20)
CALCIUM SERPL-MCNC: 8.9 MG/DL (ref 8.3–10.6)
CHLORIDE BLD-SCNC: 95 MMOL/L (ref 99–110)
CO2: 26 MMOL/L (ref 21–32)
CREAT SERPL-MCNC: <0.5 MG/DL (ref 0.6–1.2)
GFR AFRICAN AMERICAN: >60
GFR NON-AFRICAN AMERICAN: >60
GLUCOSE BLD-MCNC: 120 MG/DL (ref 70–99)
POTASSIUM SERPL-SCNC: 4.9 MMOL/L (ref 3.5–5.1)
SODIUM BLD-SCNC: 131 MMOL/L (ref 136–145)

## 2022-07-25 PROCEDURE — 2700000000 HC OXYGEN THERAPY PER DAY

## 2022-07-25 PROCEDURE — C1769 GUIDE WIRE: HCPCS

## 2022-07-25 PROCEDURE — 99232 SBSQ HOSP IP/OBS MODERATE 35: CPT | Performed by: INTERNAL MEDICINE

## 2022-07-25 PROCEDURE — 85520 HEPARIN ASSAY: CPT

## 2022-07-25 PROCEDURE — 94761 N-INVAS EAR/PLS OXIMETRY MLT: CPT

## 2022-07-25 PROCEDURE — 36415 COLL VENOUS BLD VENIPUNCTURE: CPT

## 2022-07-25 PROCEDURE — 99232 SBSQ HOSP IP/OBS MODERATE 35: CPT | Performed by: HOSPITALIST

## 2022-07-25 PROCEDURE — 36573 INSJ PICC RS&I 5 YR+: CPT

## 2022-07-25 PROCEDURE — 6370000000 HC RX 637 (ALT 250 FOR IP): Performed by: HOSPITALIST

## 2022-07-25 PROCEDURE — 6370000000 HC RX 637 (ALT 250 FOR IP): Performed by: INTERNAL MEDICINE

## 2022-07-25 PROCEDURE — 80048 BASIC METABOLIC PNL TOTAL CA: CPT

## 2022-07-25 PROCEDURE — 6360000002 HC RX W HCPCS: Performed by: INTERNAL MEDICINE

## 2022-07-25 PROCEDURE — 76705 ECHO EXAM OF ABDOMEN: CPT

## 2022-07-25 PROCEDURE — 2060000000 HC ICU INTERMEDIATE R&B

## 2022-07-25 RX ORDER — SODIUM CHLORIDE 0.9 % (FLUSH) 0.9 %
5-40 SYRINGE (ML) INJECTION EVERY 12 HOURS SCHEDULED
Status: DISCONTINUED | OUTPATIENT
Start: 2022-07-25 | End: 2022-07-29 | Stop reason: HOSPADM

## 2022-07-25 RX ORDER — SODIUM CHLORIDE 9 MG/ML
25 INJECTION, SOLUTION INTRAVENOUS PRN
Status: DISCONTINUED | OUTPATIENT
Start: 2022-07-25 | End: 2022-07-29 | Stop reason: HOSPADM

## 2022-07-25 RX ORDER — LIDOCAINE HYDROCHLORIDE 10 MG/ML
5 INJECTION, SOLUTION INFILTRATION; PERINEURAL ONCE
Status: DISCONTINUED | OUTPATIENT
Start: 2022-07-25 | End: 2022-07-29 | Stop reason: HOSPADM

## 2022-07-25 RX ORDER — SODIUM CHLORIDE 0.9 % (FLUSH) 0.9 %
5-40 SYRINGE (ML) INJECTION PRN
Status: DISCONTINUED | OUTPATIENT
Start: 2022-07-25 | End: 2022-07-29 | Stop reason: HOSPADM

## 2022-07-25 RX ADMIN — HEPARIN SODIUM 1960 UNITS/HR: 10000 INJECTION, SOLUTION INTRAVENOUS at 22:05

## 2022-07-25 RX ADMIN — MIDODRINE HYDROCHLORIDE 10 MG: 5 TABLET ORAL at 16:44

## 2022-07-25 RX ADMIN — MIDODRINE HYDROCHLORIDE 10 MG: 5 TABLET ORAL at 08:49

## 2022-07-25 RX ADMIN — MIDODRINE HYDROCHLORIDE 10 MG: 5 TABLET ORAL at 11:56

## 2022-07-25 RX ADMIN — MORPHINE SULFATE 15 MG: 15 TABLET ORAL at 01:54

## 2022-07-25 RX ADMIN — HEPARIN SODIUM 1960 UNITS/HR: 10000 INJECTION, SOLUTION INTRAVENOUS at 01:52

## 2022-07-25 RX ADMIN — Medication 1 G: at 09:29

## 2022-07-25 RX ADMIN — MORPHINE SULFATE 15 MG: 15 TABLET ORAL at 08:49

## 2022-07-25 RX ADMIN — Medication 1 TABLET: at 09:29

## 2022-07-25 RX ADMIN — MORPHINE SULFATE 15 MG: 15 TABLET ORAL at 16:44

## 2022-07-25 RX ADMIN — MORPHINE SULFATE 15 MG: 15 TABLET ORAL at 21:30

## 2022-07-25 RX ADMIN — PANTOPRAZOLE SODIUM 40 MG: 40 TABLET, DELAYED RELEASE ORAL at 06:51

## 2022-07-25 RX ADMIN — ATORVASTATIN CALCIUM 10 MG: 10 TABLET, FILM COATED ORAL at 21:30

## 2022-07-25 RX ADMIN — SERTRALINE 25 MG: 50 TABLET, FILM COATED ORAL at 09:29

## 2022-07-25 RX ADMIN — DOCUSATE SODIUM 50 MG AND SENNOSIDES 8.6 MG 2 TABLET: 8.6; 5 TABLET, FILM COATED ORAL at 14:29

## 2022-07-25 ASSESSMENT — PAIN SCALES - GENERAL
PAINLEVEL_OUTOF10: 8
PAINLEVEL_OUTOF10: 7
PAINLEVEL_OUTOF10: 3
PAINLEVEL_OUTOF10: 7

## 2022-07-25 ASSESSMENT — PAIN DESCRIPTION - DESCRIPTORS: DESCRIPTORS: DULL;DISCOMFORT

## 2022-07-25 ASSESSMENT — PAIN DESCRIPTION - ORIENTATION: ORIENTATION: LOWER

## 2022-07-25 ASSESSMENT — PAIN DESCRIPTION - LOCATION
LOCATION: ABDOMEN
LOCATION: ABDOMEN

## 2022-07-25 NOTE — PROGRESS NOTES
Hospitalist Progress Note      PCP: Linda Vickers MD    Date of Admission: 7/19/2022    Chief Complaint:   Abd discomfort    Hospital Course:      79 y.o. female who presented to [facility] with increasing shortness of breath. Patient recently diagnosed with ovarian cancer on 7/10/2022. Patient had presented to the emergency department with increasing abdominal distention. CAT scan shows metastatic ovarian CA. Patient was scheduled to see Dr. Brock Ambrose in [facility] tomorrow. Over the past week patient with progressive shortness of breath. She states yesterday it became severe. She denies history of COPD. Remote tobacco history. No oxygen use at home. Patient has been using morphine for pain control. Abdominal distention secondary to ascites per previous CT scan. Patient denies any swelling in her legs although she has had some discomfort in her upper thighs. Her last BM was this morning no blood or melena noted. Subjective:   Patient doing ok. No new complaints. Shortness of breath improved. Appetite still decreased.     Medications:  Reviewed    Infusion Medications    sodium chloride      [Held by provider] heparin (PORCINE) Infusion Stopped (07/25/22 0723)    sodium chloride       Scheduled Medications    lidocaine 1 % injection  5 mL IntraDERmal Once    sodium chloride flush  5-40 mL IntraVENous 2 times per day    sodium chloride  1 g Oral Daily    [Held by provider] aspirin  81 mg Oral Daily    Coenzyme Q10  10 mg Oral Daily    fluticasone  1 spray Each Nostril Daily    atorvastatin  10 mg Oral Daily    therapeutic multivitamin-minerals  1 tablet Oral Daily    pantoprazole  40 mg Oral QAM AC    sennosides-docusate sodium  2 tablet Oral Daily    sertraline  25 mg Oral Daily    sodium chloride flush  5-40 mL IntraVENous 2 times per day    midodrine  10 mg Oral TID WC    polyethylene glycol  17 g Oral Daily     PRN Meds: sodium chloride flush, sodium chloride, albuterol, heparin (porcine), input(s): Karen Segal in the last 72 hours. Urinalysis:      Lab Results   Component Value Date/Time    NITRU Negative 07/19/2022 02:52 PM    WBCUA 3-5 07/19/2022 02:52 PM    BACTERIA Rare 07/19/2022 02:52 PM    RBCUA 5-10 07/19/2022 02:52 PM    BLOODU Negative 07/19/2022 02:52 PM    SPECGRAV 1.010 07/19/2022 02:52 PM    GLUCOSEU Negative 07/19/2022 02:52 PM       Radiology:  US ABDOMEN LIMITED   Final Result   Limited ultrasound of the abdomen demonstrating a small amount of intra   abdominal ascites. Recommend comparison with clinical exam and consider   follow-up paracentesis in a few days if fluid increases for therapeutic   paracentesis. CT BIOPSY ABDOMEN RETROPERITONEUM   Final Result   Successful CT guided core biopsy of mesenteric nodularity. CT GUIDED NEEDLE PLACEMENT   Final Result   Successful CT guided core biopsy of mesenteric nodularity. VL Extremity Venous Bilateral   Final Result      CT CHEST PULMONARY EMBOLISM W CONTRAST   Final Result   Chest:      Bilateral pulmonary emboli, left greater than right, with bilateral pleural   effusions, left greater than right and adjacent consolidation of the lungs,   likely atelectasis. There is mild dilatation of the right heart, IVC and   hepatic veins suggesting a component of right heart insufficiency. Results   discussed with [address] [place] [address] by Krupa Eng. Marlon Caceres MD at 8:52 Am on   7/19/2022      Abdomen and pelvis:      Abdominal and pelvic ascites with findings of peritoneal carcinomatosis,   presumably due to ovarian carcinoma due to the nodular and irregular   appearance of the ovaries. CT ABDOMEN PELVIS W IV CONTRAST Additional Contrast? None   Final Result   Chest:      Bilateral pulmonary emboli, left greater than right, with bilateral pleural   effusions, left greater than right and adjacent consolidation of the lungs,   likely atelectasis.   There is mild dilatation of the right heart, IVC and   hepatic veins suggesting a component of right heart insufficiency. Results   discussed with [address] [place] [address] by Calos Bolaños. Dulce Alvarado MD at 8:52 Am on   7/19/2022      Abdomen and pelvis:      Abdominal and pelvic ascites with findings of peritoneal carcinomatosis,   presumably due to ovarian carcinoma due to the nodular and irregular   appearance of the ovaries. XR CHEST PORTABLE   Final Result   No acute process. Bibasilar hypoaeration         IR PICC WO SQ PORT/PUMP > 5 YEARS    (Results Pending)           Assessment/Plan:    Active Hospital Problems    Diagnosis     Acute deep vein thrombosis (DVT) of proximal vein of both lower extremities (HCC) [I82.4Y3]      Priority: Medium    Acute pulmonary embolism ([address] [I26.99]      Priority: Medium     1. Bilateral pulmonary emboli. Pulmonary following. Heme-onc following. Patient placed on heparin drip. Lower extremity Dopplers positive for DVT. Vascular surgery placed IVC filter. Transition to oral anticoagulant after PICC placement? 2.  Elevated troponin. Trending down. Secondary to PE per cardiology. 3.  Ovarian cancer. Heme-onc following. Gyn/Onc consulted.  >[id number]. Status post CT-guided mesenteric biopsy. Await pathology report. Patient may need to start treatment prior to discharge. 4.  Hypertension. Blood pressure improved. Will monitor. DVT Prophylaxis: Heparin drip  Diet: Diet NPO Exceptions are: Sips of Water with Meds, Ice Chips  Code Status: Full Code    PT/OT Eval Status: Pending    Dispo - Home when stable 2-3 days.     Antonio Sherman MD

## 2022-07-25 NOTE — CARE COORDINATION
Patient getting PICC line placed today and is supposed to start chemo here prior to d/c per nursing. Per oncology she needs a therapeutic paracentesis here. Patient currently on 4 liters nasal cannula. She is not on home O2. IPTA. Following for needs. May benefit from PT/OT.

## 2022-07-25 NOTE — PROGRESS NOTES
Nephrology Note                                                                                                                                                                                                                                                                                                                                                               Office : [phone]     Fax :[phone]              Patient's Name: Jagdish Moreland  11:36 AM  7/25/2022    Reason for Consult: CHICHI    Requesting Physician:  Fermin Cushing, MD      Chief Complaint:        Interval History :    CHICHI improved  Na 130 ---> 131 ----> 129 ---> 131  S/p  CT-guided biopsy of the mesentery. For paracentesis     History of Present Ilness:    Jagdish Moreland is a 79 y.o. female with PMH of HTN , recent diagnosis of probable ovarian Ca    C/w SOB    CT chest with contrast s/o PE BL    Nephrology consult for CHICHI management      Poor po intake of fluids +  Loose stools on Miralax +  NSAIDs +      Past Medical History:   Diagnosis Date    Anxiety     Ascites     DVT (deep venous thrombosis) (HCC)     Hypertension     Ovarian ca ([address] 07/10/2022    mets    Pulmonary embolism ([address] 07/19/2022    bilateral       Past Surgical History:   Procedure Laterality Date    COLONOSCOPY  9-8-14    colon polyp removed, diverticulosis    CT BIOPSY ABDOMEN RETROPERITONEUM  7/22/2022    CT BIOPSY ABDOMEN RETROPERITONEUM 7/22/2022 Ridge Malik MD [facility] CT SCAN       Family History   Problem Relation Age of Onset    Cancer Mother         Pancreatic Cancer    Diabetes Mother     High Blood Pressure Mother     Breast Cancer Sister     Breast Cancer Maternal Aunt     Breast Cancer Maternal Cousin         reports that she quit smoking about 26 years ago. Her smoking use included cigarettes. She has a 10.00 pack-year smoking history. She has never used smokeless tobacco. She reports current alcohol use of about 2.0 standard drinks per week.  She reports that she does not use drugs. Allergies:  Patient has no known allergies.     Current Medications:    lidocaine 1 % injection 5 mL, Once  sodium chloride flush 0.9 % injection 5-40 mL, 2 times per day  sodium chloride flush 0.9 % injection 5-40 mL, PRN  0.9 % sodium chloride infusion, PRN  sodium chloride tablet 1 g, Daily  [Held by provider] heparin 25,000 units in dextrose 5% 250 mL (premix) infusion, Continuous  albuterol (PROVENTIL) nebulizer solution 2.5 mg, Q6H PRN  heparin (porcine) injection 5,200 Units, PRN  heparin (porcine) injection 2,600 Units, PRN  [Held by provider] aspirin chewable tablet 81 mg, Daily  Coenzyme Q10 CAPS 10 mg (Patient Supplied), Daily  fluticasone (FLONASE) 50 MCG/ACT nasal spray 1 spray, Daily  atorvastatin (LIPITOR) tablet 10 mg, Daily  morphine (MSIR) tablet 15 mg, Q4H PRN  therapeutic multivitamin-minerals 1 tablet, Daily  pantoprazole (PROTONIX) tablet 40 mg, QAM AC  sennosides-docusate sodium (SENOKOT-S) 8.6-50 MG tablet 2 tablet, Daily  sertraline (ZOLOFT) tablet 25 mg, Daily  sodium chloride flush 0.9 % injection 5-40 mL, 2 times per day  sodium chloride flush 0.9 % injection 5-40 mL, PRN  0.9 % sodium chloride infusion, PRN  ondansetron (ZOFRAN-ODT) disintegrating tablet 4 mg, Q8H PRN   Or  ondansetron (ZOFRAN) injection 4 mg, Q6H PRN  acetaminophen (TYLENOL) tablet 650 mg, Q6H PRN   Or  acetaminophen (TYLENOL) suppository 650 mg, Q6H PRN  perflutren lipid microspheres (DEFINITY) injection 1.65 mg, ONCE PRN  senna (SENOKOT) tablet 8.6 mg, BID PRN  midodrine (PROAMATINE) tablet 10 mg, TID WC  polyethylene glycol (GLYCOLAX) packet 17 g, Daily      Review of Systems:   14 point ROS obtained but were negative except mentioned in HPI      Physical exam:     Vitals:  /75   Pulse 94   Temp 97.6 °F (36.4 °C) (Oral)   Resp 20   Ht 5' 4\" (1.626 m)   Wt 204 lb 2.3 oz (92.6 kg)   LMP  (LMP Unknown)   SpO2 96%   BMI 35.04 kg/m²   Constitutional:  OAA X3 NAD  Skin: no rash, turgor wnl  Heent:  eomi, mmm  Neck: no bruits or jvd noted  Cardiovascular:  S1, S2 without m/r/g  Respiratory: CTA B without w/r/r  Abdomen:  +bs, soft, nt, nd  Ext:  no lower extremity edema  Psychiatric: mood and affect appropriate  Musculoskeletal:  Rom, muscular strength intact    Data:   Labs:  CBC:   No results for input(s): WBC, HGB, PLT in the last 72 hours. BMP:    Recent Labs     07/23/22  1044 07/24/22  0501 07/25/22  0451   * 129* 131*   K 4.7 5.2* 4.9   CL 95* 98* 95*   CO2 25 20* 26   BUN 14 11 10   CREATININE 0.6 <0.5* <0.5*   GLUCOSE 144* 116* 120*       Ca/Mg/Phos:   Recent Labs     07/23/22  1044 07/24/22  0501 07/25/22  0451   CALCIUM 8.9 8.8 8.9       Hepatic:   No results for input(s): AST, ALT, ALB, BILITOT, ALKPHOS in the last 72 hours. Troponin:   No results for input(s): TROPONINI in the last 72 hours. BNP: No results for input(s): BNP in the last 72 hours. Lipids: No results for input(s): CHOL, TRIG, HDL, LDLCALC, LABVLDL in the last 72 hours. ABGs: No results for input(s): PHART, PO2ART, [id number] in the last 72 hours. INR:   No results for input(s): INR in the last 72 hours. UA:  No results for input(s): Hedrick Sous, GLUCOSEU, BILIRUBINUR, KETUA, SPECGRAV, BLOODU, PHUR, PROTEINU, UROBILINOGEN, NITRU, LEUKOCYTESUR, Reyne Cristy in the last 72 hours. Urine Microscopic:   No results for input(s): LABCAST, BACTERIA, COMU, HYALCAST, WBCUA, RBCUA, EPIU in the last 72 hours. Urine Culture: No results for input(s): LABURIN in the last 72 hours. Urine Chemistry:   No results for input(s): Martha Tyson, PROTEINUR, NAUR in the last 72 hours. IMAGING:  CT BIOPSY ABDOMEN RETROPERITONEUM   Final Result   Successful CT guided core biopsy of mesenteric nodularity. CT GUIDED NEEDLE PLACEMENT   Final Result   Successful CT guided core biopsy of mesenteric nodularity.          VL Extremity Venous Bilateral   Final Result      CT CHEST PULMONARY EMBOLISM W CONTRAST   Final Result   Chest:      Bilateral pulmonary emboli, left greater than right, with bilateral pleural   effusions, left greater than right and adjacent consolidation of the lungs,   likely atelectasis. There is mild dilatation of the right heart, IVC and   hepatic veins suggesting a component of right heart insufficiency. Results   discussed with Cameron Cueto by Fede Monae. Frank Coyle MD at 8:52 Am on   7/19/2022      Abdomen and pelvis:      Abdominal and pelvic ascites with findings of peritoneal carcinomatosis,   presumably due to ovarian carcinoma due to the nodular and irregular   appearance of the ovaries. CT ABDOMEN PELVIS W IV CONTRAST Additional Contrast? None   Final Result   Chest:      Bilateral pulmonary emboli, left greater than right, with bilateral pleural   effusions, left greater than right and adjacent consolidation of the lungs,   likely atelectasis. There is mild dilatation of the right heart, IVC and   hepatic veins suggesting a component of right heart insufficiency. Results   discussed with Gab5 NOLVIA Cueto by Fede Monae. Frank Coyle MD at 8:52 Am on   7/19/2022      Abdomen and pelvis:      Abdominal and pelvic ascites with findings of peritoneal carcinomatosis,   presumably due to ovarian carcinoma due to the nodular and irregular   appearance of the ovaries. XR CHEST PORTABLE   Final Result   No acute process.       Bibasilar hypoaeration         US GUIDED PARACENTESIS    (Results Pending)   IR PICC WO SQ PORT/PUMP > 5 YEARS    (Results Pending)       Assessment/Plan     Acute renal failure on CKD 3a    Etiology could be RAAS blockade from ARB , intravascular volume depletion from poor po intake , NSAIDs exposure     Component of ATN from hypotension     UA : protein trace    Urine Na < 20     Plan       Hold ACEI or ARB    CHICHI Improved        Avoid NSAIDs  Avoid contrast  Avoid hypotension   Keep MAP > 65 mmhg    Continue Midodrine TID      Hyponatremia

## 2022-07-25 NOTE — PROGRESS NOTES
PICC/CVC insertion Procedure Note    Link Ofelia   Admitted- 7/19/2022  7:09 AM  Admission diagnosis- Elevated troponin [R77.8]  Bilateral pulmonary embolism (HCC) [I26.99]  Acute pulmonary embolism, unspecified pulmonary embolism type, unspecified whether acute cor pulmonale present [facility]) [I26.99]      Attending Physician- Michelle Patiño MD  Ordering Physician- [facility]  Indication for Insertion: Chemotherapy    Lab Results   Component Value Date    INR 1.04 07/22/2022    PROTIME 13.5 07/22/2022     Lab Results   Component Value Date    WBC 8.2 07/22/2022    HGB 10.5 (L) 07/22/2022    HCT 31.0 (L) 07/22/2022     07/22/2022     Lab Results   Component Value Date    CREATININE <0.5 (L) 07/25/2022    BUN 10 07/25/2022    GFR >60 05/22/2013       Catheter Insertion Date- 7/25/2022   Catheter Brand- [employer] Inside[employer] PICC  Lot Number- [id number]  Lumen-Dual    Insertion Site- Left Brachial  Vein Diameter- .60 cm  Malay Size- 5  Catheter Length- 43 cm  Exposed Catheter Length- 2cm   PICC Tip Terminates in the Cavo Atrial Junction- Yes  Easy insertion- Yes  Able to Aspirate Blood- Yes  Easy Flush- Yes    PICC Placement Confirmation-  XRAY  PICC insertion successful- Yes  Ultrasound- yes    Okay To Use PICC- \"Yes    NURSES:  *please replace all existing IV tubing with new IV tubing prior to using the PICC for current IV infusions. *please remove any PIVs from LEFT arm. *please refrain from obtaining BPs in the LEFT arm. All of the above may be sources of infection or damage to the PICC line/site.     Electronically signed by Caden Davis, RN, RN on 7/25/2022 at 3:25 PM

## 2022-07-25 NOTE — ONCOLOGY
ONCOLOGY HEMATOLOGY CARE PROGRESS NOTE      SUBJECTIVE:     Afebrile and on 4 LPM by NC.    ROS:   The remaining 10 point review of symptoms is unremarkable. OBJECTIVE        Physical    VITALS:  /80   Pulse 99   Temp 98 °F (36.7 °C)   Resp 18   Ht 5' 4\" (1.626 m)   Wt 204 lb 2.3 oz (92.6 kg)   LMP  (LMP Unknown)   SpO2 95%   BMI 35.04 kg/m²   TEMPERATURE:  Current - Temp: 98 °F (36.7 °C); Max - Temp  Av.2 °F (36.8 °C)  Min: 97.7 °F (36.5 °C)  Max: 98.7 °F (37.1 °C)  PULSE OXIMETRY RANGE: SpO2  Av.8 %  Min: 95 %  Max: 98 %  24HR INTAKE/OUTPUT:    Intake/Output Summary (Last 24 hours) at 2022 0701  Last data filed at 2022 4880  Gross per 24 hour   Intake 882.83 ml   Output 700 ml   Net 182.83 ml         CONSTITUTIONAL:  awake, alert, cooperative, no apparent distress, HEENT oral pharynx , no scleral icterus  HEMATOLOGIC/LYMPHATICS:  no cervical lymphadenopathy, no supraclavicular lymphadenopathy, no axillary lymphadenopathy and no inguinal lymphadenopathy  LUNGS:  No increased work of breathing, good air exchange, clear to auscultation bilaterally, no crackles or wheezing  CARDIOVASCULAR:  , regular rate and rhythm, normal S1 and S2, no S3 or S4, and no murmur noted  ABDOMEN:  No scars, normal bowel sounds, soft, non-distended, non-tender, no masses palpated, no hepatosplenomegally. The abdomen is distended and unchanged. MUSCULOSKELETAL:  There is no redness, warmth, or swelling of the joints. EXTREMETIES: No clubbing cynosis or edema  NEUROLOGIC:  Awake, alert, oriented to name, place and time. Cranial nerves II-XII are grossly intact. Motor is 5 out of 5 bilaterally.    SKIN:  no bruising or bleeding      Data      Recent Labs     22  0808   WBC 8.2   HGB 10.5*   HCT 31.0*      MCV 86.3          Recent Labs     22  1044 22  0501 22  0451   * 129* 131*   K 4.7 5.2* 4.9   CL 95* 98* 95*   CO2 25 20* 26 BUN 14 11 10   CREATININE 0.6 <0.5* <0.5*       No results for input(s): AST, ALT, ALB, BILIDIR, BILITOT, ALKPHOS in the last 72 hours. Magnesium:    Lab Results   Component Value Date/Time    MG 2.10 07/07/2022 09:28 AM         Problem List  Patient Active Problem List   Diagnosis    HTN (hypertension)    HLD (hyperlipidemia)    Low back pain    Benign neoplasm of colon    Swelling, mass, or lump in head and neck    Chronic sinusitis    BERNIE (generalized anxiety disorder)    Major depressive disorder with single episode, in full remission ([facility] [address]    Generalized abdominal pain    Adnexal mass    Peritoneal carcinomatosis (HCC)    Malignant ascites    Anorexia    Reactive depression    Acute pulmonary embolism (HCC)    Acute deep vein thrombosis (DVT) of proximal vein of both lower extremities (HCC)       ASSESSMENT AND PLAN  1.) Probable ovarian cancer  - CA-125 52047 U/mL. - CT abdomen/pelvis, 7/19/2022, showed abdominoperitoneal carcinomatosis with irregular/nodular ovaries bilaterally. Moderate ascites was present. - CT-guided mesenteric biopsy, 7/22/2022. Pathology is pending.  - Will see if we can get a PICC and a therapeutic paracentesis. 2.) PE  - CTPA, 7/19/2022, showed bilateral PEs left greater than right.  - Duplex ultrasound of the BLE, 7/19/2022, showed SVT of right greater saphenous vein extending to 1.15 cm from SFJ. DVT of left distal femoral, popliteal, gastrocnemius, posterior tibial, peroneal veins.  - IVC filter placed, 7/20/2022, with Dr. Donivan Kehr. 3.) HTN      ONCOLOGIC DISPOSITION:  Would expect she will needs to stay for her path result and then start chemo.     Velma Mazariegos MD  May be reached through [facility]

## 2022-07-25 NOTE — PROGRESS NOTES
Physical Therapy/Occupational Therapy    PT/OT orders received, chart reviewed. Per RN, pt currently getting her PICC line placed. Will follow up with pt on 7/26/22 for therapy evaluations. Thank you.     Sameer Magallon, PT, DPT #[id number]  Tasneem Fernandez, OTR/L #[id number]

## 2022-07-25 NOTE — PROGRESS NOTES
component of right heart insufficiency. Results   discussed with [address] [place] [address] by Mario Gonzales. Noemi Jane MD at 8:52 Am on   7/19/2022      Abdomen and pelvis:      Abdominal and pelvic ascites with findings of peritoneal carcinomatosis,   presumably due to ovarian carcinoma due to the nodular and irregular   appearance of the ovaries. XR CHEST PORTABLE   Final Result   No acute process. Bibasilar hypoaeration             Peritoneal biopsy:      - Carcinoma, compatible with mullerian primary-       Venous doppler-       Acute partially to totally occluding superficial venous thrombosis involving    the right greater saphenous vein. Acute totally occluding deep vein thrombosis involving the left distal    femoral, popliteal, gastroc (2 of 2), posterior tibial (2 of2) , peroneal (2    of 2) and soleal veins.     No other evidence of deep vein or superficial vein thrombosis involving the    bilateral lower extremities     Assessment:  Principal Problem:    Acute pulmonary embolism (HCC)  Active Problems:    Peritoneal carcinomatosis (HCC)    Acute deep vein thrombosis (DVT) of proximal vein of both lower extremities (HCC)    Hyponatremia    Normocytic normochromic anemia    Ovarian cancer ([address]  Resolved Problems:    Acute deep vein thrombosis (DVT) of femoral vein of left lower extremity (HCC)          Plan:     Oxygen supplementation to Heidi Scales between  only  Please titrate oxygen as per these parameters   On IV heparin as per anti-Xa/APTT  S/p IVC filter placement   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 Louise Correa  Patient will be requiring adjuvant chemotherapy and surgical debulking down the line  Monitor input output and BMP  Correct electrolytes on whenever necessary basis  Nephrology is on board  PUD prophylaxis as per primary team    Case discussed with patient and nursing      Electronically signed by:  Mi Bronson MD    7/25/2022    7:55 PM.

## 2022-07-25 NOTE — PROGRESS NOTES
Patient taken to ultrasound for paracentesis, cmu aware. 1252 pm, patient back from ultrasound, cmu aware. Ultrasound unable to be completed due to lack of fluid, per patient.

## 2022-07-26 LAB — ANTI-XA UNFRAC HEPARIN: 0.28 IU/ML (ref 0.3–0.7)

## 2022-07-26 PROCEDURE — 97162 PT EVAL MOD COMPLEX 30 MIN: CPT

## 2022-07-26 PROCEDURE — 2580000003 HC RX 258: Performed by: INTERNAL MEDICINE

## 2022-07-26 PROCEDURE — 6370000000 HC RX 637 (ALT 250 FOR IP): Performed by: HOSPITALIST

## 2022-07-26 PROCEDURE — 02HV33Z INSERTION OF INFUSION DEVICE INTO SUPERIOR VENA CAVA, PERCUTANEOUS APPROACH: ICD-10-PCS | Performed by: INTERNAL MEDICINE

## 2022-07-26 PROCEDURE — 6360000002 HC RX W HCPCS: Performed by: INTERNAL MEDICINE

## 2022-07-26 PROCEDURE — 97530 THERAPEUTIC ACTIVITIES: CPT

## 2022-07-26 PROCEDURE — 6370000000 HC RX 637 (ALT 250 FOR IP): Performed by: INTERNAL MEDICINE

## 2022-07-26 PROCEDURE — 97165 OT EVAL LOW COMPLEX 30 MIN: CPT

## 2022-07-26 PROCEDURE — 2500000003 HC RX 250 WO HCPCS: Performed by: INTERNAL MEDICINE

## 2022-07-26 PROCEDURE — 1200000000 HC SEMI PRIVATE

## 2022-07-26 PROCEDURE — 99232 SBSQ HOSP IP/OBS MODERATE 35: CPT | Performed by: HOSPITALIST

## 2022-07-26 PROCEDURE — 3E03305 INTRODUCTION OF OTHER ANTINEOPLASTIC INTO PERIPHERAL VEIN, PERCUTANEOUS APPROACH: ICD-10-PCS | Performed by: INTERNAL MEDICINE

## 2022-07-26 PROCEDURE — 85520 HEPARIN ASSAY: CPT

## 2022-07-26 PROCEDURE — 97116 GAIT TRAINING THERAPY: CPT

## 2022-07-26 PROCEDURE — 99232 SBSQ HOSP IP/OBS MODERATE 35: CPT | Performed by: INTERNAL MEDICINE

## 2022-07-26 PROCEDURE — 97535 SELF CARE MNGMENT TRAINING: CPT

## 2022-07-26 RX ORDER — FAMOTIDINE 10 MG/ML
20 INJECTION, SOLUTION INTRAVENOUS ONCE
Status: COMPLETED | OUTPATIENT
Start: 2022-07-26 | End: 2022-07-26

## 2022-07-26 RX ORDER — DIPHENHYDRAMINE HYDROCHLORIDE 50 MG/ML
50 INJECTION INTRAMUSCULAR; INTRAVENOUS ONCE
Status: COMPLETED | OUTPATIENT
Start: 2022-07-26 | End: 2022-07-26

## 2022-07-26 RX ORDER — HEPARIN SODIUM 1000 [USP'U]/ML
2600 INJECTION, SOLUTION INTRAVENOUS; SUBCUTANEOUS ONCE
Status: COMPLETED | OUTPATIENT
Start: 2022-07-26 | End: 2022-07-26

## 2022-07-26 RX ADMIN — MORPHINE SULFATE 15 MG: 15 TABLET ORAL at 08:33

## 2022-07-26 RX ADMIN — APIXABAN 5 MG: 5 TABLET, FILM COATED ORAL at 20:11

## 2022-07-26 RX ADMIN — DEXAMETHASONE SODIUM PHOSPHATE 8 MG: 4 INJECTION, SOLUTION INTRAMUSCULAR; INTRAVENOUS at 14:52

## 2022-07-26 RX ADMIN — SERTRALINE 25 MG: 50 TABLET, FILM COATED ORAL at 08:33

## 2022-07-26 RX ADMIN — ATORVASTATIN CALCIUM 10 MG: 10 TABLET, FILM COATED ORAL at 20:11

## 2022-07-26 RX ADMIN — DIPHENHYDRAMINE HYDROCHLORIDE 50 MG: 50 INJECTION, SOLUTION INTRAMUSCULAR; INTRAVENOUS at 14:48

## 2022-07-26 RX ADMIN — MIDODRINE HYDROCHLORIDE 10 MG: 5 TABLET ORAL at 12:26

## 2022-07-26 RX ADMIN — CARBOPLATIN 750 MG: 10 INJECTION, SOLUTION INTRAVENOUS at 15:54

## 2022-07-26 RX ADMIN — PANTOPRAZOLE SODIUM 40 MG: 40 TABLET, DELAYED RELEASE ORAL at 06:29

## 2022-07-26 RX ADMIN — FAMOTIDINE 20 MG: 10 INJECTION, SOLUTION INTRAVENOUS at 14:46

## 2022-07-26 RX ADMIN — SODIUM CHLORIDE, PRESERVATIVE FREE 10 ML: 5 INJECTION INTRAVENOUS at 08:23

## 2022-07-26 RX ADMIN — Medication 1 TABLET: at 08:32

## 2022-07-26 RX ADMIN — MIDODRINE HYDROCHLORIDE 10 MG: 5 TABLET ORAL at 08:32

## 2022-07-26 RX ADMIN — Medication 1 G: at 08:32

## 2022-07-26 RX ADMIN — MORPHINE SULFATE 15 MG: 15 TABLET ORAL at 02:54

## 2022-07-26 RX ADMIN — MORPHINE SULFATE 15 MG: 15 TABLET ORAL at 20:16

## 2022-07-26 RX ADMIN — MORPHINE SULFATE 15 MG: 15 TABLET ORAL at 12:28

## 2022-07-26 RX ADMIN — APIXABAN 5 MG: 5 TABLET, FILM COATED ORAL at 12:26

## 2022-07-26 RX ADMIN — SODIUM CHLORIDE, PRESERVATIVE FREE 10 ML: 5 INJECTION INTRAVENOUS at 20:11

## 2022-07-26 RX ADMIN — HEPARIN SODIUM 2600 UNITS: 1000 INJECTION INTRAVENOUS; SUBCUTANEOUS at 06:28

## 2022-07-26 RX ADMIN — SODIUM CHLORIDE 360 MG: 9 INJECTION, SOLUTION INTRAVENOUS at 17:22

## 2022-07-26 RX ADMIN — DOCUSATE SODIUM 50 MG AND SENNOSIDES 8.6 MG 2 TABLET: 8.6; 5 TABLET, FILM COATED ORAL at 08:32

## 2022-07-26 ASSESSMENT — PAIN DESCRIPTION - ORIENTATION
ORIENTATION: LOWER

## 2022-07-26 ASSESSMENT — PAIN DESCRIPTION - LOCATION
LOCATION: ABDOMEN
LOCATION: ABDOMEN;BACK
LOCATION: ABDOMEN
LOCATION: ABDOMEN

## 2022-07-26 ASSESSMENT — PAIN SCALES - GENERAL
PAINLEVEL_OUTOF10: 7
PAINLEVEL_OUTOF10: 7
PAINLEVEL_OUTOF10: 6
PAINLEVEL_OUTOF10: 4

## 2022-07-26 ASSESSMENT — PAIN DESCRIPTION - DESCRIPTORS
DESCRIPTORS: ACHING
DESCRIPTORS: ACHING

## 2022-07-26 NOTE — PROGRESS NOTES
Nephrology Note                                                                                                                                                                                                                                                                                                                                                               Office : [phone]     Fax :[phone]              Patient's Name: Anjali Quintero  2:39 PM  7/26/2022    Reason for Consult: CHICHI    Requesting Physician:  Jordy France MD      Chief Complaint:        Interval History :    Oncology plan for chemo today  CHICHI improved  Na 130 ---> 131 ----> 129 ---> 131  S/p  CT-guided biopsy of the mesentery : s/o high grade serous carcinoma  For paracentesis     History of Present Ilness:    Anjali Quintero is a 79 y.o. female with PMH of HTN , recent diagnosis of probable ovarian Ca    C/w SOB    CT chest with contrast s/o PE BL    Nephrology consult for CHICHI management      Poor po intake of fluids +  Loose stools on Miralax +  NSAIDs +      Past Medical History:   Diagnosis Date    Anxiety     Ascites     DVT (deep venous thrombosis) (HCC)     Hypertension     Ovarian ca ([address] 07/10/2022    mets    Pulmonary embolism ([address] 07/19/2022    bilateral       Past Surgical History:   Procedure Laterality Date    COLONOSCOPY  9-8-14    colon polyp removed, diverticulosis    CT BIOPSY ABDOMEN RETROPERITONEUM  7/22/2022    CT BIOPSY ABDOMEN RETROPERITONEUM 7/22/2022 Marie Salcedo MD [facility] CT SCAN       Family History   Problem Relation Age of Onset    Cancer Mother         Pancreatic Cancer    Diabetes Mother     High Blood Pressure Mother     Breast Cancer Sister     Breast Cancer Maternal Aunt     Breast Cancer Maternal Cousin         reports that she quit smoking about 26 years ago. Her smoking use included cigarettes. She has a 10.00 pack-year smoking history.  She has never used smokeless tobacco. She reports current alcohol use of about 2.0 standard drinks per week. She reports that she does not use drugs. Allergies:  Patient has no known allergies.     Current Medications:    ondansetron (ZOFRAN) 8 mg, dexamethasone (DECADRON) 20 mg in sodium chloride 0.9 % 50 mL IVPB, Once  diphenhydrAMINE (BENADRYL) injection 50 mg, Once  famotidine (PEPCID) injection 20 mg, Once  PACLitaxel (TAXOL) 360 mg in sodium chloride 0.9 % 250 mL chemo infusion, Once  CARBOplatin (PARAPLATIN) 750 mg in sodium chloride 0.9 % 250 mL chemo IVPB, Once  apixaban (ELIQUIS) tablet 5 mg, BID  lidocaine 1 % injection 5 mL, Once  sodium chloride flush 0.9 % injection 5-40 mL, 2 times per day  sodium chloride flush 0.9 % injection 5-40 mL, PRN  0.9 % sodium chloride infusion, PRN  sodium chloride tablet 1 g, Daily  albuterol (PROVENTIL) nebulizer solution 2.5 mg, Q6H PRN  heparin (porcine) injection 5,200 Units, PRN  heparin (porcine) injection 2,600 Units, PRN  [Held by provider] aspirin chewable tablet 81 mg, Daily  Coenzyme Q10 CAPS 10 mg (Patient Supplied), Daily  fluticasone (FLONASE) 50 MCG/ACT nasal spray 1 spray, Daily  atorvastatin (LIPITOR) tablet 10 mg, Daily  morphine (MSIR) tablet 15 mg, Q4H PRN  therapeutic multivitamin-minerals 1 tablet, Daily  pantoprazole (PROTONIX) tablet 40 mg, QAM AC  sennosides-docusate sodium (SENOKOT-S) 8.6-50 MG tablet 2 tablet, Daily  sertraline (ZOLOFT) tablet 25 mg, Daily  0.9 % sodium chloride infusion, PRN  ondansetron (ZOFRAN-ODT) disintegrating tablet 4 mg, Q8H PRN   Or  ondansetron (ZOFRAN) injection 4 mg, Q6H PRN  acetaminophen (TYLENOL) tablet 650 mg, Q6H PRN   Or  acetaminophen (TYLENOL) suppository 650 mg, Q6H PRN  perflutren lipid microspheres (DEFINITY) injection 1.65 mg, ONCE PRN  senna (SENOKOT) tablet 8.6 mg, BID PRN  midodrine (PROAMATINE) tablet 10 mg, TID WC  polyethylene glycol (GLYCOLAX) packet 17 g, Daily      Review of Systems:   14 point ROS obtained but were negative except mentioned Final Result   Limited ultrasound of the abdomen demonstrating a small amount of intra   abdominal ascites. Recommend comparison with clinical exam and consider   follow-up paracentesis in a few days if fluid increases for therapeutic   paracentesis. CT BIOPSY ABDOMEN RETROPERITONEUM   Final Result   Successful CT guided core biopsy of mesenteric nodularity. CT GUIDED NEEDLE PLACEMENT   Final Result   Successful CT guided core biopsy of mesenteric nodularity. VL Extremity Venous Bilateral   Final Result      CT CHEST PULMONARY EMBOLISM W CONTRAST   Final Result   Chest:      Bilateral pulmonary emboli, left greater than right, with bilateral pleural   effusions, left greater than right and adjacent consolidation of the lungs,   likely atelectasis. There is mild dilatation of the right heart, IVC and   hepatic veins suggesting a component of right heart insufficiency. Results   discussed with [address] Cueto by Fernando Mendieta. Janay Bruno MD at 8:52 Am on   7/19/2022      Abdomen and pelvis:      Abdominal and pelvic ascites with findings of peritoneal carcinomatosis,   presumably due to ovarian carcinoma due to the nodular and irregular   appearance of the ovaries. CT ABDOMEN PELVIS W IV CONTRAST Additional Contrast? None   Final Result   Chest:      Bilateral pulmonary emboli, left greater than right, with bilateral pleural   effusions, left greater than right and adjacent consolidation of the lungs,   likely atelectasis. There is mild dilatation of the right heart, IVC and   hepatic veins suggesting a component of right heart insufficiency. Results   discussed with [address] by Fernando Mendieta. Janay Bruno MD at 8:52 Am on   7/19/2022      Abdomen and pelvis:      Abdominal and pelvic ascites with findings of peritoneal carcinomatosis,   presumably due to ovarian carcinoma due to the nodular and irregular   appearance of the ovaries.          XR CHEST PORTABLE   Final Result   No acute process.       Bibasilar hypoaeration             Assessment/Plan     Acute renal failure on CKD 3a    Etiology could be RAAS blockade from ARB , intravascular volume depletion from poor po intake , NSAIDs exposure     Component of ATN from hypotension     UA : protein trace    Urine Na < 20     Plan       Hold ACEI or ARB    CHICHI Improved        Avoid NSAIDs  Avoid contrast  Avoid hypotension   Keep MAP > 65 mmhg    Continue Midodrine TID      Hyponatremia :    127 ----> 130 ---> 131 ---> 129 ---> 131    DC  IV hydration  Restrict daily water intake to 1200 ml     Salt tab daily      Hyperkalemia   Low K diet        PE bilateral  On heparin drip    ovarian ca  Management per oncology     Plan for chemotherapy today      HTN  Hold BP meds  Continue Midodrine              Thank you for allowing us to participate in care of Mirna Franco MD  Feel free to contact me   [facility] [address]  Office : [phone]  Fax :[phone]

## 2022-07-26 NOTE — ONCOLOGY
ONCOLOGY HEMATOLOGY CARE PROGRESS NOTE      SUBJECTIVE:     Afebrile and on 4 LPM by NC. We discussed to risks and benefits of chemotherapy and she is willing to proceed. ROS:   The remaining 10 point review of symptoms is unremarkable. OBJECTIVE        Physical    VITALS:  BP (!) 124/97   Pulse 84   Temp 98.7 °F (37.1 °C) (Oral)   Resp 20   Ht 5' 4\" (1.626 m)   Wt 204 lb 2.3 oz (92.6 kg)   LMP  (LMP Unknown)   SpO2 98%   BMI 35.04 kg/m²   TEMPERATURE:  Current - Temp: 98.7 °F (37.1 °C); Max - Temp  Av.2 °F (36.8 °C)  Min: 97.6 °F (36.4 °C)  Max: 98.7 °F (37.1 °C)  PULSE OXIMETRY RANGE: SpO2  Av.2 %  Min: 95 %  Max: 98 %  24HR INTAKE/OUTPUT:    Intake/Output Summary (Last 24 hours) at 2022 0596  Last data filed at 2022 1830  Gross per 24 hour   Intake 433 ml   Output 800 ml   Net -367 ml         CONSTITUTIONAL:  awake, alert, cooperative, no apparent distress, HEENT oral pharynx , no scleral icterus  HEMATOLOGIC/LYMPHATICS:  no cervical lymphadenopathy, no supraclavicular lymphadenopathy, no axillary lymphadenopathy and no inguinal lymphadenopathy  LUNGS:  No increased work of breathing, good air exchange, clear to auscultation bilaterally, no crackles or wheezing  CARDIOVASCULAR:  , regular rate and rhythm, normal S1 and S2, no S3 or S4, and no murmur noted  ABDOMEN:  No scars, normal bowel sounds, soft, non-distended, non-tender, no masses palpated, no hepatosplenomegally. The abdomen is distended and unchanged. MUSCULOSKELETAL:  There is no redness, warmth, or swelling of the joints. EXTREMETIES: No clubbing cynosis or edema  NEUROLOGIC:  Awake, alert, oriented to name, place and time. Cranial nerves II-XII are grossly intact. Motor is 5 out of 5 bilaterally. SKIN:  no bruising or bleeding      Data      No results for input(s): WBC, HGB, HCT, PLT, MCV in the last 72 hours.        Recent Labs     22  1044 22  0501 07/25/22  0451   * 129* 131*   K 4.7 5.2* 4.9   CL 95* 98* 95*   CO2 25 20* 26   BUN 14 11 10   CREATININE 0.6 <0.5* <0.5*       No results for input(s): AST, ALT, ALB, BILIDIR, BILITOT, ALKPHOS in the last 72 hours. Magnesium:    Lab Results   Component Value Date/Time    MG 2.10 07/07/2022 09:28 AM         Problem List  Patient Active Problem List   Diagnosis    HTN (hypertension)    HLD (hyperlipidemia)    Low back pain    Benign neoplasm of colon    Swelling, mass, or lump in head and neck    Chronic sinusitis    BERNIE (generalized anxiety disorder)    Major depressive disorder with single episode, in full remission ([facility] [address]    Generalized abdominal pain    Adnexal mass    Peritoneal carcinomatosis (HCC)    Malignant ascites    Anorexia    Reactive depression    Acute pulmonary embolism (HCC)    Acute deep vein thrombosis (DVT) of proximal vein of both lower extremities (HCC)    Hyponatremia    Normocytic normochromic anemia    Ovarian cancer ([facility] [address]       ASSESSMENT AND PLAN  1.) Probable ovarian cancer  - CA-125 86494 U/mL. - CT abdomen/pelvis, 7/19/2022, showed abdominoperitoneal carcinomatosis with irregular/nodular ovaries bilaterally. Moderate ascites was present. - CT-guided mesenteric biopsy, 7/22/2022. 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 - Left brachial vein PICC placed, 7/25/2022. - Will give Carbo/Taxol today (7/26/2022). 2.) PE  - CTPA, 7/19/2022, showed bilateral PEs left greater than right.  - Duplex ultrasound of the BLE, 7/19/2022, showed SVT of right greater saphenous vein extending to 1.15 cm from SFJ. DVT of left distal femoral, popliteal, gastrocnemius, posterior tibial, peroneal veins.  - IVC filter placed, 7/20/2022, with Dr. Margie Forrester. 3.) HTN      ONCOLOGIC DISPOSITION:  After chemo.     Gail Lyle MD  May be reached through [address] [place] [address]

## 2022-07-26 NOTE — PROGRESS NOTES
High dose Heparin:  Anti-Xa at 2008 is 0.32IU/mL which is in the therapeutic range of 0.3-0.7IU/mL. No changes,  continue infusion at 19.6mL/hr and recheck the anti-Xa at 0200  7/26/22  Gamal Kelly [facility] PharmD 7/25/2022 8:58 PM    7/26 0337  Xa = 0.28  2600 bolus; rate = 2090 units/hr.   Next Xa [address] [facility], Pharm D.7/26/2022 5:40 AM

## 2022-07-26 NOTE — PROGRESS NOTES
INPATIENT PULMONARY CRITICAL CARE PROGRESS NOTE      Reason for visit    Acute pulmonary embolism     SUBJECTIVE: Patient when seen this morning was not having any increasing cough or expectoration or increasing shortness of breath or any chest, patient still has some abdominal discomfort, patient was afebrile and hemodynamically maintained, patient was still getting heparin infusion when seen, patient was on 4 L of nasal cannula oxygen with saturation 95% when evaluated, patient has a sinus rhythm on the monitor, patient has low documented urine output which may not be objective, patient was alert and oriented, no other pertinent review of system of concern         Physical Exam:  Blood pressure 122/82, pulse 96, temperature 98.1 °F (36.7 °C), temperature source Oral, resp. rate 20, height 5' 4\" (1.626 m), weight 204 lb 2.3 oz (92.6 kg), SpO2 95 %, not currently breastfeeding.'   Constitutional:  No acute distress. HENT:  Oropharynx is clear and moist. No thyromegaly. Eyes:  Conjunctivae are normal. Pupils equal, round, and reactive to light. No scleral icterus. Neck: . No tracheal deviation present. No obvious thyroid mass. Cardiovascular: Normal rate, regular rhythm, normal heart sounds. No right ventricular heave. Some lower extremity edema. Pulmonary/Chest: No wheezes. No rales. Chest wall is not dull to percussion. No accessory muscle usage or stridor. Decreased breath sound density  Abdominal: Soft. Bowel sounds present. ; distended no tenderness. Musculoskeletal: No cyanosis. No clubbing. No obvious joint deformity. Lymphadenopathy: No cervical or supraclavicular adenopathy. Skin: Skin is warm and dry. No rash or nodules on the exposed extremities. Psychiatric: Normal mood and affect. Behavior is normal.  No anxiety. Neurologic: Alert, awake and oriented. PERRL.   Speech fluent        Results:    BMP:   Recent Labs     07/24/22  0501 07/25/22  0451   * 131*   K 5.2* 4.9   CL 98* 95*   CO2 20* 26   BUN 11 10   CREATININE <0.5* <0.5*           Imaging:  I have reviewed radiology images personally. IR PICC WO SQ PORT/PUMP > 5 YEARS   Final Result   Successful placement of PICC line. US ABDOMEN LIMITED   Final Result   Limited ultrasound of the abdomen demonstrating a small amount of intra   abdominal ascites. Recommend comparison with clinical exam and consider   follow-up paracentesis in a few days if fluid increases for therapeutic   paracentesis. CT BIOPSY ABDOMEN RETROPERITONEUM   Final Result   Successful CT guided core biopsy of mesenteric nodularity. CT GUIDED NEEDLE PLACEMENT   Final Result   Successful CT guided core biopsy of mesenteric nodularity. VL Extremity Venous Bilateral   Final Result      CT CHEST PULMONARY EMBOLISM W CONTRAST   Final Result   Chest:      Bilateral pulmonary emboli, left greater than right, with bilateral pleural   effusions, left greater than right and adjacent consolidation of the lungs,   likely atelectasis. There is mild dilatation of the right heart, IVC and   hepatic veins suggesting a component of right heart insufficiency. Results   discussed with Cameron Cueto by Krupa Eng. Marlon Caceres MD at 8:52 Am on   7/19/2022      Abdomen and pelvis:      Abdominal and pelvic ascites with findings of peritoneal carcinomatosis,   presumably due to ovarian carcinoma due to the nodular and irregular   appearance of the ovaries. CT ABDOMEN PELVIS W IV CONTRAST Additional Contrast? None   Final Result   Chest:      Bilateral pulmonary emboli, left greater than right, with bilateral pleural   effusions, left greater than right and adjacent consolidation of the lungs,   likely atelectasis. There is mild dilatation of the right heart, IVC and   hepatic veins suggesting a component of right heart insufficiency. Results   discussed with Gab5 NOLVIA Cueto by Krupa Eng.  Marlon Caceres MD at 8:52 Am on   7/19/2022      Abdomen and pelvis:      Abdominal and pelvic ascites with findings of peritoneal carcinomatosis,   presumably due to ovarian carcinoma due to the nodular and irregular   appearance of the ovaries. XR CHEST PORTABLE   Final Result   No acute process. Bibasilar hypoaeration             Peritoneal biopsy:      - Carcinoma, compatible with mullerian primary-       Venous doppler-       Acute partially to totally occluding superficial venous thrombosis involving    the right greater saphenous vein. Acute totally occluding deep vein thrombosis involving the left distal    femoral, popliteal, gastroc (2 of 2), posterior tibial (2 of2) , peroneal (2    of 2) and soleal veins.     No other evidence of deep vein or superficial vein thrombosis involving the    bilateral lower extremities     Assessment:  Principal Problem:    Acute pulmonary embolism (HCC)  Active Problems:    Peritoneal carcinomatosis (HCC)    Acute deep vein thrombosis (DVT) of proximal vein of both lower extremities (HCC)    Hyponatremia    Normocytic normochromic anemia    Ovarian cancer ([address]  Resolved Problems:    Acute deep vein thrombosis (DVT) of femoral vein of left lower extremity (HCC)          Plan:     Oxygen supplementation to keepSaO2 between 90-94% only  Please titrate oxygen as per these parameters   On IV heparin as per anti-Xa/APTT-can be changed to Lovenox or NOAC depending on guidance from oncology  S/p IVC filter placement   Patient underwent peritoneal  biopsy which 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 Sandy Turner  Patient will be requiring adjuvant chemotherapy and surgical debulking down the line-chemotherapy being contemplated by oncology for today   Monitor input output and BMP  PUD prophylaxis as per primary team    Case discussed with patient and nursing  Case d/w case management      No other recommendations from Pulmonary/CCM stand point -will sign off -please call on PRN basis       Electronically signed by:  Suzanna Heath MD    7/26/2022    11:18 AM.

## 2022-07-26 NOTE — PROGRESS NOTES
Patient from [id number], report from [facility]. Skin assessment completed, patient with biopsy site on right side of abd and has right femoral dressing from IVC filter placement, C/D/I. Patient states her pain is in her abd and wraps around to her back, 4/10. She says this is from moving from the chair upstairs to the bed before coming to floor. Patient's friend is at bedside. Assessment completed and documented. VSS. A/ox4. Ambulates as x1 assist. Tele monitor on with continuous pulse ox monitoring, Yesica from [address] [place] [address] notified. Bed locked and in lowest position. Bedside table and call light within reach. Denies further needs at this time.

## 2022-07-26 NOTE — CARE COORDINATION
Hospital day 7: Patient transfer from  care managed by IM, Hem/Onc, and Nephrology. PAtietn starting in house chemo this date. Patient from home IPTA, watching for potential 02 need 93% on 3L 02, baseline room air. PAtietn with recs for [address] following. ARABELLA Paredes

## 2022-07-26 NOTE — PROGRESS NOTES
Comprehensive Nutrition Assessment    Type and Reason for Visit:  RD Nutrition Re-Screen/LOS    Nutrition Recommendations/Plan:   Continue no added salt diet w/ FR per MD  Continue Ensure clear TID  Trial magic cups BID  Consider adding bowel regimen d/t no BM x4 days- MD to advise   Monitor nutrition adequacy, pertinent labs, bowel habits, wt changes, and clinical progress     Malnutrition Assessment:  Malnutrition Status: At risk for malnutrition (Comment) (07/26/22 8153)    Context:  Acute Illness     Findings of the 6 clinical characteristics of malnutrition:  Energy Intake:  75% or less of estimated energy requirements for 7 or more days    Nutrition Assessment:    LOS assessment. Pt admitted for pulmonary embolism, recently dx metastatic ovarian CA. Currently on no added salt, 1200ml FR diet with PO intakes 1-75% per EMR. Ensure clear ordered TID, ensure enlive previously trialed and d/c'd. Pt unavailable during multiple attempts today. FAMILIA wt changes d/t limited wt hx per EMR. Recommend to continue current diet and ONS, will also trial adding magic cups BID. Will monitor. Nutrition Related Findings:    Na 131. Trace BLE edema. LBM 7/22. Wound Type: None       Current Nutrition Intake & Therapies:    Average Meal Intake: 51-75%, 26-50%, 1-25%  Average Supplements Intake: Unable to assess  ADULT ORAL NUTRITION SUPPLEMENT; Breakfast, Lunch, Dinner; Clear Liquid Oral Supplement  ADULT DIET; Regular; No Added Salt (3-4 gm); 1200 ml    Anthropometric Measures:  Height: 5' 4\" (162.6 cm)  Ideal Body Weight (IBW): 120 lbs (55 kg)       Current Body Weight: 194 lb (88 kg), 161.7 % IBW. Weight Source: Standing Scale  Current BMI (kg/m2): 33.3                          BMI Categories: Obese Class 1 (BMI 30.0-34. 9)    Estimated Daily Nutrient Needs:  Energy Requirements Based On: Kcal/kg (18-20)  Weight Used for Energy Requirements: Current  Energy (kcal/day): 4524-6659  Weight Used for Protein Requirements: Ideal (1.2-1.5)  Protein (g/day): 66-83     Fluid (ml/day): 1200ml fluid restriction per MD    Nutrition Diagnosis:   Inadequate oral intake related to inadequate protein-energy intake as evidenced by intake 0-25%, intake 26-50%    Nutrition Interventions:   Food and/or Nutrient Delivery: Continue Current Diet, Modify Oral Nutrition Supplement  Nutrition Education/Counseling: No recommendation at this time  Coordination of Nutrition Care: Continue to monitor while inpatient       Goals:     Goals: PO intake 50% or greater, prior to discharge       Nutrition Monitoring and Evaluation:   Behavioral-Environmental Outcomes: None Identified  Food/Nutrient Intake Outcomes: Food and Nutrient Intake, Supplement Intake  Physical Signs/Symptoms Outcomes: Biochemical Data, Nutrition Focused Physical Findings, Weight, Constipation    Discharge Planning:     Too soon to determine     [address] [facility], [address]  Contact: [phone]

## 2022-07-26 NOTE — PROGRESS NOTES
Physical Therapy  Facility/Department: [facility]  Physical Therapy Initial Assessment    Name: Jeri Joseph  : 1955  MRN: [id number]  Date of Service: 2022    Discharge Recommendations:  Home with assist PRN, Home with Home health PT   PT Equipment Recommendations  Equipment Needed: No      Patient Diagnosis(es): The primary encounter diagnosis was Acute pulmonary embolism, unspecified pulmonary embolism type, unspecified whether acute cor pulmonale present ([address] A diagnosis of Elevated troponin was also pertinent to this visit. Past Medical History:  has a past medical history of Anxiety, Ascites, DVT (deep venous thrombosis) ([address] Hypertension, Ovarian ca ([address] and Pulmonary embolism ([address] Past Surgical History:  has a past surgical history that includes Colonoscopy (14) and CT BIOPSY ABDOMEN RETROPERITONEUM (2022). Assessment   Body Structures, Functions, Activity Limitations Requiring Skilled Therapeutic Intervention: Decreased functional mobility ; Decreased strength;Decreased endurance;Decreased balance  Assessment: Pt referred for PT evaluation during current hospital stay with dx of SOB 2* B PE's and abdominal distension. Pt with newly diagnosed ovarian CA. Today pt presents with increased weakness and impaired endurance compared to baseline, requiring CGA/close SBA x 1 for safe sit<>stand transfers and amb. Recommend pt return home with assist PRN from family & home PT services upon D/C. Pt will continue to be seen by PT in acute hospital to address above deficits and build strength/endurance and improve functional mobility during remainder of LOS.   Treatment Diagnosis: Decreased endurance and (I) with functional mobility  Specific Instructions for Next Treatment: Progress ther ex and mobility as tolerated  Therapy Prognosis: Good  Decision Making: Medium Complexity  Requires PT Follow-Up: Yes  Activity Tolerance: Patient tolerated evaluation without incident;Patient tolerated treatment well;Patient limited by endurance  Activity Tolerance Comments: O2 sats decreased to 90-91% after amb to/from bathroom and later after amb 125 feet in hallway (while on 3L O2). Increased work of breathing noted with amb. Plan   Plan: 3-5 times per week  Specific Instructions for Next Treatment: Progress ther ex and mobility as tolerated  Current Treatment Recommendations: Strengthening, Balance training, Functional mobility training, Transfer training, Endurance training, Gait training, Stair training, Home exercise program, Safety education & training, Patient/Caregiver education & training, Therapeutic activities  Safety Devices: All fall risk precautions in place, Call light within reach, Gait belt, Left in chair, Nurse notified     Restrictions  Restrictions/Precautions  Restrictions/Precautions: General Precautions  Position Activity Restriction  Other position/activity restrictions: Medium fall risk per nursing assessment, up with assistance, IV lines, PICC LUE, 3L O2, telemetry     Subjective   General  Chart Reviewed: Yes  Patient assessed for rehabilitation services?: Yes  Additional Pertinent Hx: Newly diagnosed ovarian CA (this hospital admission)  Family / Caregiver Present: Yes (friend)  Referring Practitioner: Dr. Uday Benson  Referral Date : 07/25/22  Diagnosis: SOB, B PE's; abdominal distension, CT shows metastatic ovarian CA (newly diagnosed)  Follows Commands: Within Functional Limits  General Comment  Comments: Pt resting in bed upon entry of PT  Subjective: Pt agreeable to work with PT this morning, very pleasant and cooperative. Pain: Pt denies pain.     Social/Functional History  Social/Functional History  Lives With: Alone  Type of Home: House  Home Layout: One level, Laundry in basement (1-story home with basement)  Home Access: Stairs to enter with rails  Entrance Stairs - Number of Steps: 3 SHON through side entrance (with L handrail ascending)  Entrance Stairs - Rails: Left  Bathroom Shower/Tub: Tub/Shower unit, Walk-in shower (tub/shower on main level (pt typically uses this bathroom), walk-in shower in basement)  Bathroom Toilet: Standard  Home Equipment:  (no home DME, no home O2 at baseline)  Has the patient had two or more falls in the past year or any fall with injury in the past year?: No  ADL Assistance: Independent  Homemaking Assistance: Independent  Ambulation Assistance: Independent (without AD)  Transfer Assistance: Independent  Active : Yes  Occupation: Retired  Type of Occupation: [employer] to the pool, swimming, working in the [place]    [address]: Impaired  Vision Exceptions: Wears glasses for reading  Hearing  Hearing: Within functional limits      Cognition   Orientation  Overall Orientation Status: Within Normal Limits     Objective   Heart Rate: 96  Heart Rate Source: Monitor  BP: 122/82  BP Location: Right upper arm  BP Method: Automatic  Patient Position: Semi fowlers  MAP (Calculated): 95.33  Resp: 20  SpO2: 95 %  O2 Device: Nasal cannula (3L O2)    Observation/Palpation  Posture: Good  Gross Assessment  AROM: Within functional limits  Strength: Within functional limits  Coordination: Within functional limits  Tone: Normal     Bed Mobility Training  Supine to Sit: Supervision  Scooting: Supervision    Balance  Sitting: Intact  Standing: Intact (with occasional CGA from therapist for dynamic standing activities)    Transfer Training  Sit to Stand: Contact-guard assistance  Stand to Sit: Stand-by assistance  Bed to Chair: Stand-by assistance;Contact-guard assistance (without AD, moving from bed>toilet>chair)  Car Transfer: Stand-by assistance;Contact-guard assistance    Gait Training  Overall Level of Assistance: Contact-guard assistance;Stand-by assistance (CGA x 1 decreasing to SBA with repetition)  Speed/Fabienne: Pace decreased (< 100 feet/min); Slow  Step Length: Left shortened;Right

## 2022-07-26 NOTE — PROGRESS NOTES
Report received from day shift RN. Patient resting comfortably in bed. No signs of discomfort or distress. Bed is in lowest position, wheels locked, 2/2 side rails up. Bedside table and call light within reach. White board updated. Will continue to monitor patient. Terrance Carter RN    9:31 PM  Shift assessment complete. (See findings in flowsheet). Med pass complete. (See MAR). VSS. Patient with no complaints at this time. Terrance Carter RN    1:04 AM  Patient resting quietly in bed. No needs at this time. Terrance Carter RN    2:55 AM  PRN morphine given for pain.  Terrance Carter RN

## 2022-07-26 NOTE — PROGRESS NOTES
Occupational Therapy  Facility/Department: [facility] C3 TELE/MED SURG/ONC  Occupational Therapy Initial Assessment/Treatment    Name: Vale Owens  : 1955  MRN: [id number]  Date of Service: 2022    Discharge Recommendations:  Home with assist PRN, Home with Home health OT  OT Equipment Recommendations  Other: Pt may benefit from a shower chair d/t decreased endurance. Patient Diagnosis(es): The primary encounter diagnosis was Acute pulmonary embolism, unspecified pulmonary embolism type, unspecified whether acute cor pulmonale present ([facility] [address] A diagnosis of Elevated troponin was also pertinent to this visit. Past Medical History:  has a past medical history of Anxiety, Ascites, DVT (deep venous thrombosis) ([facility] [address] Hypertension, Ovarian ca ([facility] [address] and Pulmonary embolism ([facility] [address] Past Surgical History:  has a past surgical history that includes Colonoscopy (14) and CT BIOPSY ABDOMEN RETROPERITONEUM (2022). Assessment   Performance deficits / Impairments: Decreased functional mobility ; Decreased ADL status; Decreased balance;Decreased endurance;Decreased ROM    Assessment: Pt is a 68yo female with deficits in the areas listed above after an acute pulmonary embolism and DVT and with a diagnosis of ovarian CA Pt reports independence in ADLS and functional mobility with no AD at baseline. Today, pt grossly CGA-SBA with ADLS and functional mobility with no AD. Pt primarily limited by decreased endurance and balance and required rest break during activities and increased assistance d/t these deficits. Pt performing LB ADLS and standing self cares with SBA. Pt would continue to benefit from skilled OT services to increase independence, balance and endurance for ADLS and functional mobility.     Prognosis: Good  Decision Making: Low Complexity  REQUIRES OT FOLLOW-UP: Yes  Activity Tolerance  Activity Tolerance: Patient Tolerated treatment well  Activity Tolerance Comments: Pt experiencing some faitgue and decrease in O2 to 90% with activity from 96% at rest. Pt recovering with rest. Pt on 3L of O2. Plan   Plan  Times per Week: 3-5x/week  Current Treatment Recommendations: Balance training, Functional mobility training, Endurance training, Self-Care / ADL, Home management training, Equipment evaluation, education, & procurement     Restrictions  Restrictions/Precautions  Restrictions/Precautions: General Precautions  Position Activity Restriction  Other position/activity restrictions: Medium fall risk per nursing assessment, up with assistance, IV lines, PICC LUE, 3L O2, telemetry    Subjective   General  Chart Reviewed: Yes  Patient assessed for rehabilitation services?: Yes  Additional Pertinent Hx: Ovarian CA  Response to previous treatment: Patient with no complaints from previous session  Family / Caregiver Present: Yes (friend)  Referring Practitioner: Luis Enrique Durbin MD  Diagnosis: Acute Pulmonary Embolism; DVT; Inferior vena cavogram with placement of Celect retrievable inferior vena cava filter (7/20)  Subjective  Subjective: Pt in bed and agreeable to therapy. General Comment  Comments: RN approved therapy.      Social/Functional History  Social/Functional History  Lives With: Alone  Type of Home: House  Home Layout: One level, Laundry in basement (1-story home with basement)  Home Access: Stairs to enter with rails  Entrance Stairs - Number of Steps: 3 SHON through side entrance (with L handrail ascending)  Entrance Stairs - Rails: Left  Bathroom Shower/Tub: Tub/Shower unit, Walk-in shower (tub/shower on main level (pt typically uses this bathroom), walk-in shower in basement)  Bathroom Toilet: Standard  Home Equipment:  (no home DME, no home O2 at baseline)  Has the patient had two or more falls in the past year or any fall with injury in the past year?: No  ADL Assistance: Independent  Homemaking Assistance: Independent  Ambulation Assistance: Independent (without AD)  Transfer Assistance: Independent  Active : Yes  Occupation: Retired  Type of Occupation: Purchasing dept at [address]: Going to the pool, swimming, working in the yard       Objective   Heart Rate: [address]: Monitor  BP: 122/82  BP Location: Right upper arm  BP Method: Automatic  Patient Position: Semi fowlers  MAP (Calculated): 95.33  Resp: 20  SpO2: 95 %  O2 Device: Nasal cannula (3L O2)          Observation/Palpation  Posture: Good  Safety Devices  Type of Devices: All fall risk precautions in place;Call light within reach;Gait belt;Left in chair;Nurse notified    Bed Mobility Training  Bed Mobility Training: Yes  Supine to Sit: Supervision  Scooting: Supervision    Balance  Sitting: Intact  Standing: Intact (with occasional CGA from therapist for dynamic standing activities. Activities: to/from restroom, LB clothing management, standing ADLS, in crum functional mobility to prepare for household distances. )    Transfer Training  Transfer Training: Yes  Sit to Stand: Contact-guard assistance  Stand to Sit: Stand-by assistance  Bed to Chair: Stand-by assistance;Contact-guard assistance (without AD, moving from bed>toilet>chair)  Toilet Transfer: Stand-by assistance    Gait Training  Gait Training: Yes  Gait  Overall Level of Assistance: Contact-guard assistance;Stand-by assistance (CGA x 1 decreasing to SBA with repetition)  Speed/Igor: Pace decreased (< 100 feet/min); Slow  Step Length: Left shortened;Right shortened  Gait Abnormalities: Trunk sway increased (pt amb with slow igor and decreased stride length compared to baseline, but appears steady with no further obvious gait abnormalities noted)  Distance (ft): 125 Feet (2 x 15 feet, x 125 feet; amb distance limited by increased work of breathing/SOB)  Assistive Device: Other (comment) (no device)     AROM: Within functional limits  Strength:  Within functional limits  Coordination: Within functional limits  Tone: Normal  Sensation: Intact    ADL  Grooming: Stand by assistance  Grooming Skilled Clinical Factors: hand hygiene at sink in stance. LE Dressing: Stand by assistance  LE Dressing Skilled Clinical Factors: to don pants. Toileting: Stand by assistance  Toileting Skilled Clinical Factors: on standard toilet with use of R grab bar for t/fs. Activity Tolerance  Activity Tolerance: Patient tolerated evaluation without incident;Patient tolerated treatment well;Patient limited by endurance  Activity Tolerance Comments: O2 sats decreased to 90-91% after amb to/from bathroom and later after amb 125 feet in hallway (while on 3L O2). Increased work of breathing noted with amb. Vision  Vision: Impaired  Vision Exceptions: Wears glasses for reading  Hearing  Hearing: Within functional limits  Cognition  Overall Cognitive Status: WFL  Orientation  Overall Orientation Status: Within Normal Limits                  Education Given To: Patient  Education Provided: Role of Therapy; ADL Adaptive Strategies; Plan of Care;Energy Conservation;Equipment  Education Provided Comments: disease specific: benefit of shower chair, role of OT in acute care vs PT, general safety, use of call light, prevention of complications, take rest breaks PRN, d/c recommendations and reasoning. Education Method: Verbal  Barriers to Learning: None  Education Outcome: Verbalized understanding          AM-PAC Score        AM-[facility] Inpatient Daily Activity Raw Score: 23 (07/26/22 1133)  AM-PAC Inpatient ADL T-Scale Score : 51.12 (07/26/22 1133)  ADL Inpatient CMS 0-100% Score: 15.86 (07/26/22 1133)  ADL Inpatient CMS G-Code Modifier : CI (07/26/22 1133)    Goals  Short Term Goals  Time Frame for Short term goals: 1 week (8/2) unless stated otherwise. Short Term Goal 1: Pt will total body dress with mod (I).   Short Term Goal 2: Pt will perform BUE ther ex x20 to increase endurance for functional activities (8/9)  Short Term Goal 3: Pt will perform at least 7 minutes of standing functional activity with mod (I). Patient Goals   Patient goals : \"to go home as independent as possible\"       Therapy Time   Individual Concurrent Group Co-treatment   Time In 0850         Time Out 0929         Minutes 39         Timed Code Treatment Minutes: 29 Minutes (10 minute evaluation)       Alanis Mena OTR/L  If pt discharges prior to next session, this note will serve as discharge summary. See case management note for discharge disposition.

## 2022-07-27 ENCOUNTER — TELEPHONE (OUTPATIENT)
Dept: FAMILY MEDICINE CLINIC | Age: 67
End: 2022-07-27

## 2022-07-27 LAB
ANION GAP SERPL CALCULATED.3IONS-SCNC: 13 MMOL/L (ref 3–16)
BUN BLDV-MCNC: 12 MG/DL (ref 7–20)
CALCIUM SERPL-MCNC: 9.4 MG/DL (ref 8.3–10.6)
CHLORIDE BLD-SCNC: 101 MMOL/L (ref 99–110)
CO2: 24 MMOL/L (ref 21–32)
CREAT SERPL-MCNC: <0.5 MG/DL (ref 0.6–1.2)
GFR AFRICAN AMERICAN: >60
GFR NON-AFRICAN AMERICAN: >60
GLUCOSE BLD-MCNC: 166 MG/DL (ref 70–99)
POTASSIUM SERPL-SCNC: 5.2 MMOL/L (ref 3.5–5.1)
SODIUM BLD-SCNC: 138 MMOL/L (ref 136–145)

## 2022-07-27 PROCEDURE — 2700000000 HC OXYGEN THERAPY PER DAY

## 2022-07-27 PROCEDURE — 6370000000 HC RX 637 (ALT 250 FOR IP): Performed by: INTERNAL MEDICINE

## 2022-07-27 PROCEDURE — 99232 SBSQ HOSP IP/OBS MODERATE 35: CPT | Performed by: HOSPITALIST

## 2022-07-27 PROCEDURE — 6370000000 HC RX 637 (ALT 250 FOR IP): Performed by: HOSPITALIST

## 2022-07-27 PROCEDURE — 1200000000 HC SEMI PRIVATE

## 2022-07-27 PROCEDURE — 97535 SELF CARE MNGMENT TRAINING: CPT

## 2022-07-27 PROCEDURE — 2580000003 HC RX 258: Performed by: INTERNAL MEDICINE

## 2022-07-27 PROCEDURE — 80048 BASIC METABOLIC PNL TOTAL CA: CPT

## 2022-07-27 PROCEDURE — 94761 N-INVAS EAR/PLS OXIMETRY MLT: CPT

## 2022-07-27 PROCEDURE — 97530 THERAPEUTIC ACTIVITIES: CPT

## 2022-07-27 RX ORDER — SIMETHICONE 80 MG
80 TABLET,CHEWABLE ORAL EVERY 6 HOURS PRN
Status: DISCONTINUED | OUTPATIENT
Start: 2022-07-27 | End: 2022-07-29 | Stop reason: HOSPADM

## 2022-07-27 RX ADMIN — ATORVASTATIN CALCIUM 10 MG: 10 TABLET, FILM COATED ORAL at 21:36

## 2022-07-27 RX ADMIN — MIDODRINE HYDROCHLORIDE 10 MG: 5 TABLET ORAL at 08:34

## 2022-07-27 RX ADMIN — APIXABAN 5 MG: 5 TABLET, FILM COATED ORAL at 08:34

## 2022-07-27 RX ADMIN — SERTRALINE 25 MG: 50 TABLET, FILM COATED ORAL at 09:56

## 2022-07-27 RX ADMIN — Medication 40 ML: at 10:05

## 2022-07-27 RX ADMIN — DOCUSATE SODIUM 50 MG AND SENNOSIDES 8.6 MG 2 TABLET: 8.6; 5 TABLET, FILM COATED ORAL at 09:56

## 2022-07-27 RX ADMIN — PANTOPRAZOLE SODIUM 40 MG: 40 TABLET, DELAYED RELEASE ORAL at 06:21

## 2022-07-27 RX ADMIN — Medication 10 ML: at 10:04

## 2022-07-27 RX ADMIN — Medication 1 G: at 09:56

## 2022-07-27 RX ADMIN — MIDODRINE HYDROCHLORIDE 10 MG: 5 TABLET ORAL at 18:45

## 2022-07-27 RX ADMIN — MORPHINE SULFATE 15 MG: 15 TABLET ORAL at 18:47

## 2022-07-27 RX ADMIN — SODIUM ZIRCONIUM CYCLOSILICATE 5 G: 5 POWDER, FOR SUSPENSION ORAL at 14:11

## 2022-07-27 RX ADMIN — SODIUM CHLORIDE, PRESERVATIVE FREE 10 ML: 5 INJECTION INTRAVENOUS at 09:56

## 2022-07-27 RX ADMIN — Medication 1 TABLET: at 09:56

## 2022-07-27 RX ADMIN — SODIUM CHLORIDE, PRESERVATIVE FREE 10 ML: 5 INJECTION INTRAVENOUS at 21:36

## 2022-07-27 RX ADMIN — MORPHINE SULFATE 15 MG: 15 TABLET ORAL at 02:45

## 2022-07-27 RX ADMIN — APIXABAN 5 MG: 5 TABLET, FILM COATED ORAL at 21:36

## 2022-07-27 RX ADMIN — MORPHINE SULFATE 15 MG: 15 TABLET ORAL at 12:35

## 2022-07-27 RX ADMIN — MORPHINE SULFATE 15 MG: 15 TABLET ORAL at 23:08

## 2022-07-27 RX ADMIN — MORPHINE SULFATE 15 MG: 15 TABLET ORAL at 08:05

## 2022-07-27 RX ADMIN — MIDODRINE HYDROCHLORIDE 10 MG: 5 TABLET ORAL at 12:26

## 2022-07-27 ASSESSMENT — PAIN DESCRIPTION - LOCATION
LOCATION: ABDOMEN

## 2022-07-27 ASSESSMENT — PAIN DESCRIPTION - ORIENTATION
ORIENTATION: LOWER
ORIENTATION: LOWER

## 2022-07-27 ASSESSMENT — PAIN DESCRIPTION - DESCRIPTORS
DESCRIPTORS: ACHING;DULL
DESCRIPTORS: ACHING
DESCRIPTORS: SHARP;CRAMPING
DESCRIPTORS: CRAMPING;SHARP

## 2022-07-27 ASSESSMENT — PAIN SCALES - GENERAL
PAINLEVEL_OUTOF10: 6
PAINLEVEL_OUTOF10: 8
PAINLEVEL_OUTOF10: 10
PAINLEVEL_OUTOF10: 8
PAINLEVEL_OUTOF10: 9

## 2022-07-27 ASSESSMENT — PAIN DESCRIPTION - PAIN TYPE
TYPE: ACUTE PAIN
TYPE: ACUTE PAIN

## 2022-07-27 ASSESSMENT — PAIN DESCRIPTION - ONSET: ONSET: ON-GOING

## 2022-07-27 ASSESSMENT — PAIN DESCRIPTION - FREQUENCY: FREQUENCY: CONTINUOUS

## 2022-07-27 ASSESSMENT — PAIN - FUNCTIONAL ASSESSMENT: PAIN_FUNCTIONAL_ASSESSMENT: ACTIVITIES ARE NOT PREVENTED

## 2022-07-27 NOTE — PROGRESS NOTES
Completion of Chemotherapy: Monitoring during infusion done per policy, see Flowsheets. Blood return verified before, during, and after infusion per policy; no signs of extravasation. Pt tolerated chemotherapy well and without incident. Chemotherapy infusion end time on the MAR. VSS.      Electronically signed by Bonnie Restrepo RN on 7/26/2022 at 9:32 PM

## 2022-07-27 NOTE — PROGRESS NOTES
Conscious and coherent. Afebrile. No chemotherapeutic reactions. No dyspnea/no chest pain. No cyanosis  or pallor. No phlebitis/no infiltration. No extravasation. With brisk blood return on Left Chest port.

## 2022-07-27 NOTE — PROGRESS NOTES
Nephrology Note                                                                                                                                                                                                                                                                                                                                                               Office : [phone]     Fax :[phone]              Patient's Name: Татьяна Mujica  11:25 AM  7/27/2022    Reason for Consult: CHICHI    Requesting Physician:  Ariel Yanez MD      Chief Complaint:        Interval History :      S/p chemo on 7/26  CHICHI improved  K 5.2  Na 130 ---> 131 ----> 129 ---> 131 ----> 138  S/p  CT-guided biopsy of the mesentery : s/o high grade serous carcinoma  For paracentesis     History of Present Ilness:    Татьяна Mujica is a 79 y.o. female with PMH of HTN , recent diagnosis of probable ovarian Ca    C/w SOB    CT chest with contrast s/o PE BL    Nephrology consult for CHICHI management      Poor po intake of fluids +  Loose stools on Miralax +  NSAIDs +      Past Medical History:   Diagnosis Date    Anxiety     Ascites     DVT (deep venous thrombosis) (HCC)     Hypertension     Ovarian ca ([address] 07/10/2022    mets    Pulmonary embolism ([address] 07/19/2022    bilateral       Past Surgical History:   Procedure Laterality Date    COLONOSCOPY  9-8-14    colon polyp removed, diverticulosis    CT BIOPSY ABDOMEN RETROPERITONEUM  7/22/2022    CT BIOPSY ABDOMEN RETROPERITONEUM 7/22/2022 Violeta Murray MD [facility] CT SCAN       Family History   Problem Relation Age of Onset    Cancer Mother         Pancreatic Cancer    Diabetes Mother     High Blood Pressure Mother     Breast Cancer Sister     Breast Cancer Maternal Aunt     Breast Cancer Maternal Cousin         reports that she quit smoking about 26 years ago. Her smoking use included cigarettes. She has a 10.00 pack-year smoking history.  She has never used smokeless tobacco. She reports current alcohol use of about 2.0 standard drinks per week. She reports that she does not use drugs. Allergies:  Patient has no known allergies.     Current Medications:    simethicone (MYLICON) chewable tablet 80 mg, Q6H PRN  sodium zirconium cyclosilicate (LOKELMA) oral suspension 5 g, Once  apixaban (ELIQUIS) tablet 5 mg, BID  lidocaine 1 % injection 5 mL, Once  sodium chloride flush 0.9 % injection 5-40 mL, 2 times per day  sodium chloride flush 0.9 % injection 5-40 mL, PRN  0.9 % sodium chloride infusion, PRN  albuterol (PROVENTIL) nebulizer solution 2.5 mg, Q6H PRN  heparin (porcine) injection 5,200 Units, PRN  heparin (porcine) injection 2,600 Units, PRN  [Held by provider] aspirin chewable tablet 81 mg, Daily  Coenzyme Q10 CAPS 10 mg (Patient Supplied), Daily  fluticasone (FLONASE) 50 MCG/ACT nasal spray 1 spray, Daily  atorvastatin (LIPITOR) tablet 10 mg, Daily  morphine (MSIR) tablet 15 mg, Q4H PRN  therapeutic multivitamin-minerals 1 tablet, Daily  pantoprazole (PROTONIX) tablet 40 mg, QAM AC  sennosides-docusate sodium (SENOKOT-S) 8.6-50 MG tablet 2 tablet, Daily  sertraline (ZOLOFT) tablet 25 mg, Daily  0.9 % sodium chloride infusion, PRN  ondansetron (ZOFRAN-ODT) disintegrating tablet 4 mg, Q8H PRN   Or  ondansetron (ZOFRAN) injection 4 mg, Q6H PRN  acetaminophen (TYLENOL) tablet 650 mg, Q6H PRN   Or  acetaminophen (TYLENOL) suppository 650 mg, Q6H PRN  perflutren lipid microspheres (DEFINITY) injection 1.65 mg, ONCE PRN  senna (SENOKOT) tablet 8.6 mg, BID PRN  midodrine (PROAMATINE) tablet 10 mg, TID WC  polyethylene glycol (GLYCOLAX) packet 17 g, Daily      Review of Systems:   14 point ROS obtained but were negative except mentioned in HPI      Physical exam:     Vitals:  /88   Pulse (!) 110   Temp 98.1 °F (36.7 °C) (Oral)   Resp 16   Ht 5' 4\" (1.626 m)   Wt 194 lb (88 kg)   LMP  (LMP Unknown)   SpO2 93%   BMI 33.30 kg/m²   Constitutional:  OAA X3 NAD  Skin: no rash, turgor wnl  Heent:  eomi, mmm  Neck: no bruits or jvd noted  Cardiovascular:  S1, S2 without m/r/g  Respiratory: CTA B without w/r/r  Abdomen:  +bs, soft, nt, nd  Ext:  no lower extremity edema  Psychiatric: mood and affect appropriate  Musculoskeletal:  Rom, muscular strength intact    Data:   Labs:  CBC:   No results for input(s): WBC, HGB, PLT in the last 72 hours. BMP:    Recent Labs     07/25/22  0451 07/27/22  0610   * 138   K 4.9 5.2*   CL 95* 101   CO2 26 24   BUN 10 12   CREATININE <0.5* <0.5*   GLUCOSE 120* 166*       Ca/Mg/Phos:   Recent Labs     07/25/22  0451 07/27/22  0610   CALCIUM 8.9 9.4       Hepatic:   No results for input(s): AST, ALT, ALB, BILITOT, ALKPHOS in the last 72 hours. Troponin:   No results for input(s): TROPONINI in the last 72 hours. BNP: No results for input(s): BNP in the last 72 hours. Lipids: No results for input(s): CHOL, TRIG, HDL, LDLCALC, LABVLDL in the last 72 hours. ABGs: No results for input(s): PHART, PO2ART, HDT7GBY in the last 72 hours. INR:   No results for input(s): INR in the last 72 hours. UA:  No results for input(s): Majel Jacinto, GLUCOSEU, BILIRUBINUR, KETUA, SPECGRAV, BLOODU, PHUR, PROTEINU, UROBILINOGEN, NITRU, LEUKOCYTESUR, Ade Proper in the last 72 hours. Urine Microscopic:   No results for input(s): LABCAST, BACTERIA, COMU, HYALCAST, WBCUA, RBCUA, EPIU in the last 72 hours. Urine Culture: No results for input(s): LABURIN in the last 72 hours. Urine Chemistry:   No results for input(s): Zack Shine, PROTEINUR, NAUR in the last 72 hours. IMAGING:  IR PICC WO SQ PORT/PUMP > 5 YEARS   Final Result   Successful placement of PICC line. US ABDOMEN LIMITED   Final Result   Limited ultrasound of the abdomen demonstrating a small amount of intra   abdominal ascites. Recommend comparison with clinical exam and consider   follow-up paracentesis in a few days if fluid increases for therapeutic   paracentesis.          CT BIOPSY ABDOMEN RETROPERITONEUM   Final Result   Successful CT guided core biopsy of mesenteric nodularity. CT GUIDED NEEDLE PLACEMENT   Final Result   Successful CT guided core biopsy of mesenteric nodularity. VL Extremity Venous Bilateral   Final Result      CT CHEST PULMONARY EMBOLISM W CONTRAST   Final Result   Chest:      Bilateral pulmonary emboli, left greater than right, with bilateral pleural   effusions, left greater than right and adjacent consolidation of the lungs,   likely atelectasis. There is mild dilatation of the right heart, IVC and   hepatic veins suggesting a component of right heart insufficiency. Results   discussed with Cameron Cueto by Monster Elkins. Starla Sandoval MD at 8:52 Am on   7/19/2022      Abdomen and pelvis:      Abdominal and pelvic ascites with findings of peritoneal carcinomatosis,   presumably due to ovarian carcinoma due to the nodular and irregular   appearance of the ovaries. CT ABDOMEN PELVIS W IV CONTRAST Additional Contrast? None   Final Result   Chest:      Bilateral pulmonary emboli, left greater than right, with bilateral pleural   effusions, left greater than right and adjacent consolidation of the lungs,   likely atelectasis. There is mild dilatation of the right heart, IVC and   hepatic veins suggesting a component of right heart insufficiency. Results   discussed with Cameron Cueto by Monster Elkins. Starla Sandoval MD at 8:52 Am on   7/19/2022      Abdomen and pelvis:      Abdominal and pelvic ascites with findings of peritoneal carcinomatosis,   presumably due to ovarian carcinoma due to the nodular and irregular   appearance of the ovaries. XR CHEST PORTABLE   Final Result   No acute process.       Bibasilar hypoaeration             Assessment/Plan     Acute renal failure on CKD 3a    Etiology could be RAAS blockade from ARB , intravascular volume depletion from poor po intake , NSAIDs exposure     Component of ATN from hypotension     UA : protein trace    Urine Na < 20     Plan       Hold ACEI or ARB    CHICHI Improved        Avoid NSAIDs  Avoid contrast  Avoid hypotension   Keep MAP > 65 mmhg    Continue Midodrine TID      Hyponatremia :    127 ----> 130 ---> 131 ---> 129 ---> 131      DC salt tab    Hyperkalemia   Low K diet    Lokelma         PE bilateral  On heparin drip    ovarian ca  Management per oncology         HTN  Hold BP meds  Continue Midodrine              Thank you for allowing us to participate in care of Rose Mary Darby MD  Feel free to contact me   [facility] [address]  Office : [phone]  Fax :[phone]

## 2022-07-27 NOTE — ONCOLOGY
ONCOLOGY HEMATOLOGY CARE PROGRESS NOTE      SUBJECTIVE:     Afebrile and on 3 LPM by NC. She tolerated chemo well. ROS:   The remaining 10 point review of symptoms is unremarkable. OBJECTIVE        Physical    VITALS:  /83   Pulse 93   Temp 97.9 °F (36.6 °C) (Oral)   Resp 18   Ht 5' 4\" (1.626 m)   Wt 194 lb (88 kg)   LMP  (LMP Unknown)   SpO2 93%   BMI 33.30 kg/m²   TEMPERATURE:  Current - Temp: 97.9 °F (36.6 °C); Max - Temp  Av.2 °F (36.8 °C)  Min: 97.6 °F (36.4 °C)  Max: 98.8 °F (37.1 °C)  PULSE OXIMETRY RANGE: SpO2  Av.4 %  Min: 92 %  Max: 96 %  24HR INTAKE/OUTPUT:    Intake/Output Summary (Last 24 hours) at 2022 0655  Last data filed at 2022 0300  Gross per 24 hour   Intake --   Output 700 ml   Net -700 ml         CONSTITUTIONAL:  awake, alert, cooperative, no apparent distress, HEENT oral pharynx , no scleral icterus  HEMATOLOGIC/LYMPHATICS:  no cervical lymphadenopathy, no supraclavicular lymphadenopathy, no axillary lymphadenopathy and no inguinal lymphadenopathy  LUNGS:  No increased work of breathing, good air exchange, clear to auscultation bilaterally, no crackles or wheezing  CARDIOVASCULAR:  , regular rate and rhythm, normal S1 and S2, no S3 or S4, and no murmur noted  ABDOMEN:  No scars, normal bowel sounds, soft, non-distended, non-tender, no masses palpated, no hepatosplenomegally. The abdomen is distended and unchanged. MUSCULOSKELETAL:  There is no redness, warmth, or swelling of the joints. EXTREMETIES: No clubbing cynosis or edema  NEUROLOGIC:  Awake, alert, oriented to name, place and time. Cranial nerves II-XII are grossly intact. Motor is 5 out of 5 bilaterally. SKIN:  no bruising or bleeding      Data      No results for input(s): WBC, HGB, HCT, PLT, MCV in the last 72 hours.        Recent Labs     22  0451   *   K 4.9   CL 95*   CO2 26   BUN 10   CREATININE <0.5*       No results for input(s): AST, ALT, ALB, BILIDIR, BILITOT, ALKPHOS in the last 72 hours. Magnesium:    Lab Results   Component Value Date/Time    MG 2.10 07/07/2022 09:28 AM         Problem List  Patient Active Problem List   Diagnosis    HTN (hypertension)    HLD (hyperlipidemia)    Low back pain    Benign neoplasm of colon    Swelling, mass, or lump in head and neck    Chronic sinusitis    BERNIE (generalized anxiety disorder)    Major depressive disorder with single episode, in full remission ([facility] [address]    Generalized abdominal pain    Adnexal mass    Peritoneal carcinomatosis (HCC)    Malignant ascites    Anorexia    Reactive depression    Acute pulmonary embolism (HCC)    Acute deep vein thrombosis (DVT) of proximal vein of both lower extremities (HCC)    Hyponatremia    Normocytic normochromic anemia    Ovarian cancer ([facility] [address]       ASSESSMENT AND PLAN  1.) Probable ovarian cancer  - CA-125 88823 U/mL. - CT abdomen/pelvis, 7/19/2022, showed abdominoperitoneal carcinomatosis with irregular/nodular ovaries bilaterally. Moderate ascites was present. - CT-guided mesenteric biopsy, 7/22/2022. 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 - US abdomen, 7/25/2022, showed too little ascites for paracentesis. - Left brachial vein PICC placed, 7/25/2022. - Carbo/Taxol #1 given on 7/26/2022. Due again on 8/16/2022.     2.) PE  - CTPA, 7/19/2022, showed bilateral PEs left greater than right.  - Duplex ultrasound of the BLE, 7/19/2022, showed SVT of right greater saphenous vein extending to 1.15 cm from SFJ. DVT of left distal femoral, popliteal, gastrocnemius, posterior tibial, peroneal veins.  - IVC filter placed, 7/20/2022, with Dr. Raquel Brooks.  - Treating with Eliquis. 3.) HTN      ONCOLOGIC DISPOSITION:  No oncologic barrier to discharge.     Aristeo Nguyen MD  May be reached through [facility]

## 2022-07-27 NOTE — PROGRESS NOTES
Occupational Therapy  Facility/Department: [facility] C3 TELE/MED SURG/ONC  Daily Treatment Note  NAME: Adan Machuca  : 1955  MRN: [id number]    Date of Service: 2022    Discharge Recommendations:  Home with assist PRN, Home with Home health OT  OT Equipment Recommendations  Equipment Needed: Yes  Mobility Devices: ADL Assistive Devices  ADL Assistive Devices: Shower Chair with back  Other: Pt may benefit from a shower chair d/t decreased endurance. Patient Diagnosis(es): The primary encounter diagnosis was Acute pulmonary embolism, unspecified pulmonary embolism type, unspecified whether acute cor pulmonale present ([facility] [address] A diagnosis of Elevated troponin was also pertinent to this visit. Assessment    Assessment: Pt tolerated therapy well this date and made good progress towards her goals. Pt was received seated up in bed w/ family present in room. Pt completed all mobility and ADL tasks this date w/ SBA overall for safety and balance. Pt ambulated from bed>toilet w/ no AD and completed grooming tasks standing at sink. Pt transferred to chair at EOS. Pt is making good strides w/ therapy but continues to present below her PLOF. Pt would benefit from continued services while inpatient, as well as skilled OT from [facility] and assist at home PRN. Activity Tolerance: Patient tolerated evaluation without incident;Patient tolerated treatment well;Patient limited by endurance  Discharge Recommendations: Home with assist PRN;Home with Home health OT  Equipment Needed: Yes  Mobility Devices: ADL Assistive Devices  Other: Pt may benefit from a shower chair d/t decreased endurance. Plan   Plan  Times per Week: 3-5x/week  Current Treatment Recommendations: Balance training;Functional mobility training; Endurance training;Self-Care / ADL; Home management training;Equipment evaluation, education, & procurement     Restrictions  Restrictions/Precautions  Restrictions/Precautions: General Precautions  Position Activity Restriction  Other position/activity restrictions: Medium fall risk per nursing assessment, up with assistance, IV lines, PICC LUE, 3L O2, telemetry    Subjective   Subjective  Subjective: Pt recieved seated up in bed upon arrival, agreeable to participate  Pain: Pt does not report numeric pain, endorses general \"discomfort\"  Orientation  Overall Orientation Status: Within Normal Limits  Orientation Level: Oriented X4  Cognition  Overall Cognitive Status: WFL      Objective    Vitals  Vitals  Heart Rate: (!) 110  Heart Rate Source: Monitor  BP: 135/88  BP Location: Right upper arm  BP Method: Automatic  Patient Position: Semi fowlers  MAP (Calculated): 103.67  SpO2: 93 %  O2 Device: Nasal cannula (3L O2)    Bed Mobility Training  Bed Mobility Training: Yes  Overall Level of Assistance: Stand-by assistance  Interventions: Verbal cues; Safety awareness training  Supine to Sit: Supervision  Sit to Supine: Supervision  Balance  Sitting: Intact  Standing: Intact (occasional CGA for dynamic activities. Pt had one LOB when initially completing STS from bed)  Transfer Training  Transfer Training: Yes  Overall Level of Assistance: Stand-by assistance  Sit to Stand: Stand-by assistance  Stand to Sit: Stand-by assistance  Bed to Chair: Stand-by assistance (no AD for bed>toilet>chair)  Toilet Transfer: Stand-by assistance     ADL  Grooming: Stand by assistance (in stance at sink for ~5min)  Grooming Skilled Clinical Factors: hand hygiene, wash face, brush teeth  UE Bathing: Supervision;Setup (seated at chair)  UE Bathing Skilled Clinical Factors: wash underarms, apply deordorant  Toileting: Stand by assistance  Toileting Skilled Clinical Factors: on standard toilet with use of R grab bar for transfer     Safety Devices  Type of Devices: All fall risk precautions in place;Call light within reach;Gait belt;Left in chair;Nurse notified; Chair alarm in place     Patient Education  Education Given To: Patient; Family  Education Provided: Role of Therapy; ADL Adaptive Strategies; Plan of Care;Energy Conservation;Equipment  Education Provided Comments: disease specific: benefit of shower chair, role of OT in acute care vs PT, general safety, use of call light, prevention of complications, take rest breaks PRN, d/c recommendations and reasoning. Education Method: Verbal  Barriers to Learning: None  Education Outcome: Verbalized understanding    Goals  Short Term Goals  Time Frame for Short term goals: 1 week (8/2) unless stated otherwise. Short Term Goal 1: Pt will total body dress with mod (I). Short Term Goal 2: Pt will perform BUE ther ex x20 to increase endurance for functional activities (8/9)  Short Term Goal 3: Pt will perform at least 7 minutes of standing functional activity with mod (I) - 7/27 progressing, pt able to tolerate ~5min standing while completing grooming  Patient Goals   Patient goals : \"to go home as independent as possible\"       AM-PAC Daily Activity Inpatient   How much help for putting on and taking off regular lower body clothing?: None  How much help for Bathing?: A Little  How much help for Toileting?: None  How much help for putting on and taking off regular upper body clothing?: None  How much help for taking care of personal grooming?: None  How much help for eating meals?: None  AM-PAC Inpatient Daily Activity Raw Score: 23  AM-PAC Inpatient ADL T-Scale Score : 51.12  ADL Inpatient CMS 0-100% Score: 15.86  ADL Inpatient CMS G-Code Modifier : CI       Therapy Time   Individual Concurrent Group Co-treatment   Time In 1039         Time Out 1117         Minutes 38         Timed Code Treatment Minutes: 38 Minutes     If pt is unable to be seen after this session, please let this note serve as discharge summary. Please see case management note for discharge disposition. Thank you.      Amara Romeo OTR/JACOB

## 2022-07-27 NOTE — TELEPHONE ENCOUNTER
Gallo Almanza from [facility] calling requesting verbal orders for skilled nursing and PT for Forrest Michael.      Gallo Almanza: [phone]

## 2022-07-27 NOTE — PROGRESS NOTES
Hospitalist Progress Note      PCP: Hemalatha Shaikh MD    Date of Admission: 7/19/2022    Chief Complaint:   Abd discomfort    Hospital Course:      79 y.o. female who presented to [facility] with increasing shortness of breath. Patient recently diagnosed with ovarian cancer on 7/10/2022. Patient had presented to the emergency department with increasing abdominal distention. CAT scan shows metastatic ovarian CA. Patient was scheduled to see Dr. Eran Ceja in [facility] tomorrow. Over the past week patient with progressive shortness of breath. She states yesterday it became severe. She denies history of COPD. Remote tobacco history. No oxygen use at home. Patient has been using morphine for pain control. Abdominal distention secondary to ascites per previous CT scan. Patient denies any swelling in her legs although she has had some discomfort in her upper thighs. Her last BM was this morning no blood or melena noted. Subjective:   Patient s/p PICC line. She feels a little better. No chest pain or dyspnea.      Medications:  Reviewed    Infusion Medications    sodium chloride      sodium chloride       Scheduled Medications    apixaban  5 mg Oral BID    lidocaine 1 % injection  5 mL IntraDERmal Once    sodium chloride flush  5-40 mL IntraVENous 2 times per day    sodium chloride  1 g Oral Daily    [Held by provider] aspirin  81 mg Oral Daily    Coenzyme Q10  10 mg Oral Daily    fluticasone  1 spray Each Nostril Daily    atorvastatin  10 mg Oral Daily    therapeutic multivitamin-minerals  1 tablet Oral Daily    pantoprazole  40 mg Oral QAM AC    sennosides-docusate sodium  2 tablet Oral Daily    sertraline  25 mg Oral Daily    midodrine  10 mg Oral TID WC    polyethylene glycol  17 g Oral Daily     PRN Meds: sodium chloride flush, sodium chloride, albuterol, heparin (porcine), heparin (porcine), morphine, sodium chloride, ondansetron **OR** ondansetron, acetaminophen **OR** acetaminophen, perflutren lipid microspheres, senna    No intake or output data in the 24 hours ending 07/26/22 2028      Physical Exam Performed:    BP (!) 138/93   Pulse (!) 114   Temp 97.6 °F (36.4 °C) (Oral)   Resp 18   Ht 5' 4\" (1.626 m)   Wt 194 lb (88 kg)   LMP  (LMP Unknown)   SpO2 94%   BMI 33.30 kg/m²     General appearance: No acute distress, appears stated age and cooperative. HEENT: Pupils equal, round, and reactive to light. Conjunctivae/corneas clear. Neck: Supple, with full range of motion. No jugular venous distention. Trachea midline. Respiratory:  Normal respiratory effort. Clear to auscultation, bilaterally without Rales/Wheezes/Rhonchi. Cardiovascular: Regular rate and rhythm with normal S1/S2 without murmurs, rubs or gallops. Abdomen: Distended but soft. Nontender. Musculoskeletal: No clubbing, cyanosis. Edema bilaterally LE. Full range of motion without deformity. Skin: Skin color, texture, turgor normal.  No rashes or lesions. Neurologic:  Neurovascularly intact without any focal sensory/motor deficits. Cranial nerves: II-XII intact, grossly non-focal.  Psychiatric: Alert and oriented, thought content appropriate, normal insight  Capillary Refill: Brisk,3 seconds, normal   Peripheral Pulses: +2 palpable, equal bilaterally       Labs:   No results for input(s): WBC, HGB, HCT, PLT in the last 72 hours. Recent Labs     07/24/22  0501 07/25/22  0451   * 131*   K 5.2* 4.9   CL 98* 95*   CO2 20* 26   BUN 11 10   CREATININE <0.5* <0.5*   CALCIUM 8.8 8.9       No results for input(s): AST, ALT, BILIDIR, BILITOT, ALKPHOS in the last 72 hours. No results for input(s): INR in the last 72 hours. No results for input(s): Katmitulrniko Belch in the last 72 hours.       Urinalysis:      Lab Results   Component Value Date/Time    NITRU Negative 07/19/2022 02:52 PM    [address] 07/19/2022 02:52 PM    BACTERIA Rare 07/19/2022 02:52 PM    RBCUA 5-10 07/19/2022 02:52 PM    BLOODU Negative 07/19/2022 02:52 PM    SPECGRAV 1.010 07/19/2022 02:52 PM    GLUCOSEU Negative 07/19/2022 02:52 PM       Radiology:  IR PICC WO SQ PORT/PUMP > 5 YEARS   Final Result   Successful placement of PICC line. US ABDOMEN LIMITED   Final Result   Limited ultrasound of the abdomen demonstrating a small amount of intra   abdominal ascites. Recommend comparison with clinical exam and consider   follow-up paracentesis in a few days if fluid increases for therapeutic   paracentesis. CT BIOPSY ABDOMEN RETROPERITONEUM   Final Result   Successful CT guided core biopsy of mesenteric nodularity. CT GUIDED NEEDLE PLACEMENT   Final Result   Successful CT guided core biopsy of mesenteric nodularity. VL Extremity Venous Bilateral   Final Result      CT CHEST PULMONARY EMBOLISM W CONTRAST   Final Result   Chest:      Bilateral pulmonary emboli, left greater than right, with bilateral pleural   effusions, left greater than right and adjacent consolidation of the lungs,   likely atelectasis. There is mild dilatation of the right heart, IVC and   hepatic veins suggesting a component of right heart insufficiency. Results   discussed with Cameron Cueto by Claudette York. Ramez Acevedo MD at 8:52 Am on   7/19/2022      Abdomen and pelvis:      Abdominal and pelvic ascites with findings of peritoneal carcinomatosis,   presumably due to ovarian carcinoma due to the nodular and irregular   appearance of the ovaries. CT ABDOMEN PELVIS W IV CONTRAST Additional Contrast? None   Final Result   Chest:      Bilateral pulmonary emboli, left greater than right, with bilateral pleural   effusions, left greater than right and adjacent consolidation of the lungs,   likely atelectasis. There is mild dilatation of the right heart, IVC and   hepatic veins suggesting a component of right heart insufficiency. Results   discussed with Cameron Cueto by Claudette York.  Ramez Acevedo MD at 8:52 Am on   7/19/2022      Abdomen and pelvis:      Abdominal and pelvic ascites with findings of peritoneal carcinomatosis,   presumably due to ovarian carcinoma due to the nodular and irregular   appearance of the ovaries. XR CHEST PORTABLE   Final Result   No acute process. Bibasilar hypoaeration                 Assessment/Plan:    Active Hospital Problems    Diagnosis     Hyponatremia [E87.1]      Priority: Medium    Normocytic normochromic anemia [D64.9]      Priority: Medium    Ovarian cancer ([facility] [address] [C56.9]      Priority: Medium    Acute deep vein thrombosis (DVT) of proximal vein of both lower extremities (HCC) [I82.4Y3]      Priority: Medium    Acute pulmonary embolism ([address] [I26.99]      Priority: Medium    Peritoneal carcinomatosis ([facility] [address] [C78.6]      Priority: Medium     1. Bilateral pulmonary emboli. Pulmonary following. Heme-onc following. Lower extremity Dopplers positive for DVT. Vascular surgery placed IVC filter. Transition to oral anticoagulant today. 2.  Elevated troponin. Secondary to PE per cardiology. 3.  Ovarian cancer. Heme-onc following. Gyn/Onc consulted.  >[id number]. Status post CT-guided mesenteric biopsy. Mullerian origin carcinoma. Start treatment. 4.  Hypertension. Blood pressure improved. Will monitor. DVT Prophylaxis: Heparin drip  Diet: ADULT ORAL NUTRITION SUPPLEMENT; Breakfast, Lunch, Dinner; Clear Liquid Oral Supplement  ADULT DIET; Regular; No Added Salt (3-4 gm); 1200 ml  ADULT ORAL NUTRITION SUPPLEMENT; Lunch, Dinner; Frozen Oral Supplement  Code Status: Full Code    PT/OT Eval Status: Pending    Dispo - Home when stable 2-3 days.     Aurea Herr MD

## 2022-07-27 NOTE — CARE COORDINATION
Hospital day 8: Patient on C3 re Acute PE care managed IM, Hem/Onc, Nephrology, and Pulm. Patient from home reports will have daily supports at [place] between friends and family. Patient edu following for [address] and 02 needs. Writer edu shower no over by INS reports will obtain  on own as to save on cost. Patient will need walk test, DME provider looking for qualifying dx. SW will ct t follow. Robert Day, ARABELLA

## 2022-07-28 ENCOUNTER — APPOINTMENT (OUTPATIENT)
Dept: ULTRASOUND IMAGING | Age: 67
DRG: 754 | End: 2022-07-28
Payer: MEDICARE

## 2022-07-28 ENCOUNTER — APPOINTMENT (OUTPATIENT)
Dept: GENERAL RADIOLOGY | Age: 67
DRG: 754 | End: 2022-07-28
Payer: MEDICARE

## 2022-07-28 LAB
ANION GAP SERPL CALCULATED.3IONS-SCNC: 9 MMOL/L (ref 3–16)
BUN BLDV-MCNC: 15 MG/DL (ref 7–20)
CALCIUM SERPL-MCNC: 8.7 MG/DL (ref 8.3–10.6)
CHLORIDE BLD-SCNC: 102 MMOL/L (ref 99–110)
CO2: 26 MMOL/L (ref 21–32)
CREAT SERPL-MCNC: <0.5 MG/DL (ref 0.6–1.2)
GFR AFRICAN AMERICAN: >60
GFR NON-AFRICAN AMERICAN: >60
GLUCOSE BLD-MCNC: 113 MG/DL (ref 70–99)
POTASSIUM SERPL-SCNC: 4.3 MMOL/L (ref 3.5–5.1)
SODIUM BLD-SCNC: 137 MMOL/L (ref 136–145)

## 2022-07-28 PROCEDURE — 6370000000 HC RX 637 (ALT 250 FOR IP): Performed by: HOSPITALIST

## 2022-07-28 PROCEDURE — 6360000002 HC RX W HCPCS: Performed by: INTERNAL MEDICINE

## 2022-07-28 PROCEDURE — 80048 BASIC METABOLIC PNL TOTAL CA: CPT

## 2022-07-28 PROCEDURE — 2700000000 HC OXYGEN THERAPY PER DAY

## 2022-07-28 PROCEDURE — 99232 SBSQ HOSP IP/OBS MODERATE 35: CPT | Performed by: HOSPITALIST

## 2022-07-28 PROCEDURE — 1200000000 HC SEMI PRIVATE

## 2022-07-28 PROCEDURE — 6370000000 HC RX 637 (ALT 250 FOR IP): Performed by: INTERNAL MEDICINE

## 2022-07-28 PROCEDURE — 2580000003 HC RX 258: Performed by: INTERNAL MEDICINE

## 2022-07-28 PROCEDURE — 74018 RADEX ABDOMEN 1 VIEW: CPT

## 2022-07-28 PROCEDURE — 97116 GAIT TRAINING THERAPY: CPT

## 2022-07-28 PROCEDURE — 76705 ECHO EXAM OF ABDOMEN: CPT

## 2022-07-28 PROCEDURE — 94761 N-INVAS EAR/PLS OXIMETRY MLT: CPT

## 2022-07-28 PROCEDURE — 97110 THERAPEUTIC EXERCISES: CPT

## 2022-07-28 RX ADMIN — SODIUM CHLORIDE, PRESERVATIVE FREE 10 ML: 5 INJECTION INTRAVENOUS at 08:55

## 2022-07-28 RX ADMIN — SERTRALINE 25 MG: 50 TABLET, FILM COATED ORAL at 08:54

## 2022-07-28 RX ADMIN — MORPHINE SULFATE 15 MG: 15 TABLET ORAL at 08:54

## 2022-07-28 RX ADMIN — Medication 1 TABLET: at 08:54

## 2022-07-28 RX ADMIN — APIXABAN 5 MG: 5 TABLET, FILM COATED ORAL at 20:14

## 2022-07-28 RX ADMIN — MORPHINE SULFATE 15 MG: 15 TABLET ORAL at 22:00

## 2022-07-28 RX ADMIN — MIDODRINE HYDROCHLORIDE 10 MG: 5 TABLET ORAL at 08:55

## 2022-07-28 RX ADMIN — ONDANSETRON 4 MG: 2 INJECTION INTRAMUSCULAR; INTRAVENOUS at 17:18

## 2022-07-28 RX ADMIN — PANTOPRAZOLE SODIUM 40 MG: 40 TABLET, DELAYED RELEASE ORAL at 05:26

## 2022-07-28 RX ADMIN — MORPHINE SULFATE 15 MG: 15 TABLET ORAL at 04:24

## 2022-07-28 RX ADMIN — ATORVASTATIN CALCIUM 10 MG: 10 TABLET, FILM COATED ORAL at 20:15

## 2022-07-28 RX ADMIN — SODIUM CHLORIDE, PRESERVATIVE FREE 10 ML: 5 INJECTION INTRAVENOUS at 20:15

## 2022-07-28 RX ADMIN — ONDANSETRON 4 MG: 2 INJECTION INTRAMUSCULAR; INTRAVENOUS at 08:54

## 2022-07-28 RX ADMIN — MIDODRINE HYDROCHLORIDE 10 MG: 5 TABLET ORAL at 12:42

## 2022-07-28 RX ADMIN — MIDODRINE HYDROCHLORIDE 10 MG: 5 TABLET ORAL at 17:18

## 2022-07-28 RX ADMIN — MORPHINE SULFATE 15 MG: 15 TABLET ORAL at 12:42

## 2022-07-28 RX ADMIN — APIXABAN 5 MG: 5 TABLET, FILM COATED ORAL at 08:54

## 2022-07-28 RX ADMIN — MORPHINE SULFATE 15 MG: 15 TABLET ORAL at 17:17

## 2022-07-28 ASSESSMENT — PAIN DESCRIPTION - DESCRIPTORS
DESCRIPTORS: CRAMPING
DESCRIPTORS: ACHING;CRAMPING
DESCRIPTORS: ACHING
DESCRIPTORS: CRAMPING;SHARP
DESCRIPTORS: ACHING;CRAMPING

## 2022-07-28 ASSESSMENT — PAIN DESCRIPTION - ORIENTATION
ORIENTATION: LOWER

## 2022-07-28 ASSESSMENT — PAIN DESCRIPTION - LOCATION
LOCATION: ABDOMEN

## 2022-07-28 ASSESSMENT — PAIN SCALES - GENERAL
PAINLEVEL_OUTOF10: 8
PAINLEVEL_OUTOF10: 7
PAINLEVEL_OUTOF10: 8
PAINLEVEL_OUTOF10: 10
PAINLEVEL_OUTOF10: 6
PAINLEVEL_OUTOF10: 8

## 2022-07-28 ASSESSMENT — PAIN DESCRIPTION - FREQUENCY: FREQUENCY: CONTINUOUS

## 2022-07-28 ASSESSMENT — PAIN DESCRIPTION - PAIN TYPE: TYPE: ACUTE PAIN

## 2022-07-28 ASSESSMENT — PAIN - FUNCTIONAL ASSESSMENT: PAIN_FUNCTIONAL_ASSESSMENT: ACTIVITIES ARE NOT PREVENTED

## 2022-07-28 ASSESSMENT — PAIN DESCRIPTION - ONSET: ONSET: ON-GOING

## 2022-07-28 NOTE — PROGRESS NOTES
OT NOTE:   Pt with two visitors in room on arrival. Pt declining OT this day stating she was in 8/10 pain and wanted to rest and visit with her visitors. Pt given hot packs for pain and assisted in notifying RN. Pt will be attempted at a later time/ date as schedule allows and pt agreeable.      Thank you  "[employer] OTR/L

## 2022-07-28 NOTE — PROGRESS NOTES
07/19/2022 02:52 PM    WBCUA 3-5 07/19/2022 02:52 PM    BACTERIA Rare 07/19/2022 02:52 PM    RBCUA 5-10 07/19/2022 02:52 PM    BLOODU Negative 07/19/2022 02:52 PM    SPECGRAV 1.010 07/19/2022 02:52 PM    GLUCOSEU Negative 07/19/2022 02:52 PM       Radiology:  XR ABDOMEN (KUB) (SINGLE AP VIEW)   Preliminary Result   No acute process is identified. No convincing evidence of bowel obstruction. Ascites. IR PICC WO SQ PORT/PUMP > 5 YEARS   Final Result   Successful placement of PICC line. US ABDOMEN LIMITED   Final Result   Limited ultrasound of the abdomen demonstrating a small amount of intra   abdominal ascites. Recommend comparison with clinical exam and consider   follow-up paracentesis in a few days if fluid increases for therapeutic   paracentesis. CT BIOPSY ABDOMEN RETROPERITONEUM   Final Result   Successful CT guided core biopsy of mesenteric nodularity. CT GUIDED NEEDLE PLACEMENT   Final Result   Successful CT guided core biopsy of mesenteric nodularity. VL Extremity Venous Bilateral   Final Result      CT CHEST PULMONARY EMBOLISM W CONTRAST   Final Result   Chest:      Bilateral pulmonary emboli, left greater than right, with bilateral pleural   effusions, left greater than right and adjacent consolidation of the lungs,   likely atelectasis. There is mild dilatation of the right heart, IVC and   hepatic veins suggesting a component of right heart insufficiency. Results   discussed with [facility] by Fede Monae. Frank Coyle MD at 8:52 Am on   7/19/2022      Abdomen and pelvis:      Abdominal and pelvic ascites with findings of peritoneal carcinomatosis,   presumably due to ovarian carcinoma due to the nodular and irregular   appearance of the ovaries.          CT ABDOMEN PELVIS W IV CONTRAST Additional Contrast? None   Final Result   Chest:      Bilateral pulmonary emboli, left greater than right, with bilateral pleural   effusions, left greater than right and adjacent consolidation of the lungs,   likely atelectasis. There is mild dilatation of the right heart, IVC and   hepatic veins suggesting a component of right heart insufficiency. Results   discussed with [facility] by Rodriguez Jamison. Noemí Alcala MD at 8:52 Am on   7/19/2022      Abdomen and pelvis:      Abdominal and pelvic ascites with findings of peritoneal carcinomatosis,   presumably due to ovarian carcinoma due to the nodular and irregular   appearance of the ovaries. XR CHEST PORTABLE   Final Result   No acute process. Bibasilar hypoaeration         IR US GUIDED PARACENTESIS    (Results Pending)           Assessment/Plan:    Active Hospital Problems    Diagnosis     Hyponatremia [E87.1]      Priority: Medium    Normocytic normochromic anemia [D64.9]      Priority: Medium    Ovarian cancer ([address] [C56.9]      Priority: Medium    Acute deep vein thrombosis (DVT) of proximal vein of both lower extremities (HCC) [I82.4Y3]      Priority: Medium    Acute pulmonary embolism ([address] [I26.99]      Priority: Medium    Peritoneal carcinomatosis ([address] [C78.6]      Priority: Medium     1. Bilateral pulmonary emboli. Pulmonary following. Heme-onc following. Lower extremity Dopplers positive for DVT. Vascular surgery placed IVC filter. Continue Eliquis. 2.  Elevated troponin. Secondary to PE per cardiology. 3.  Ovarian cancer. Heme-onc following. Gyn/Onc consulted.  >[id number]. Status post CT-guided mesenteric biopsy. Mullerian origin carcinoma. Patient started on chemo while inpatient. 4.  Hypertension. Blood pressure improved. Will monitor. 5.  Abdominal distention. This may be due to intra-abdominal process. Patient with some ascites. No obstruction. Plan for KUB and paracentesis. DVT Prophylaxis: Eliquis  Diet: ADULT ORAL NUTRITION SUPPLEMENT; Breakfast, Lunch, Dinner; Clear Liquid Oral Supplement  ADULT DIET; Regular;  No Added Salt (3-4 gm); 1200 ml  ADULT ORAL NUTRITION SUPPLEMENT; Lunch, Dinner; Frozen Oral Supplement  Code Status: Full Code    PT/OT Eval Status: Pending    Dispo - Home when stable possibly tomorrow.     Robbie Hunter MD

## 2022-07-28 NOTE — PROGRESS NOTES
Comprehensive Nutrition Assessment    Type and Reason for Visit:  Reassess    Nutrition Recommendations/Plan:   Continue no added salt diet  Fluid Restriction per MD  Encourage po intakes  Continue Ensure Clear daily, Magic Cup BID  Monitor po intakes, nutrition adequacy, weights, pertinent labs, BMs     Malnutrition Assessment:  Malnutrition Status: At risk for malnutrition (Comment) (07/26/22 0305)    Context:  Acute Illness       Nutrition Assessment:    Follow up: Pt remains nutritionally compromised d/t pt report of continued poor appetite. Pt reports that she has been having ongoing nausea, but no emesis. Currently on a no added salt diet with po intakes 1-75% per EMR. Pt states that she has been drinking some of her Ensrue Clear and eating some Magic Cups. Pt family also bringing in [address] [employer]. Will continue current diet and ONS. .    Nutrition Related Findings:    Hypoactive BS. +2 pitting BLE Edema. Wound Type: None       Current Nutrition Intake & Therapies:    Average Meal Intake: 1-25%, 26-50%, 51-75%  Average Supplements Intake: 1-25%, 26-50%, 51-75%  ADULT DIET; Regular; No Added Salt (3-4 gm); 1200 ml  ADULT ORAL NUTRITION SUPPLEMENT; Lunch, Dinner; Frozen Oral Supplement  ADULT ORAL NUTRITION SUPPLEMENT; Breakfast; Clear Liquid Oral Supplement    Anthropometric Measures:  Height: 5' 4\" (162.6 cm)  Ideal Body Weight (IBW): 120 lbs (55 kg)       Current Body Weight: 190 lb 11.2 oz (86.5 kg), 161.7 % IBW. Weight Source: Standing Scale  Current BMI (kg/m2): 32.7                          BMI Categories: Obese Class 1 (BMI 30.0-34. 9)    Estimated Daily Nutrient Needs:  Energy Requirements Based On: Kcal/kg (18-20)  Weight Used for Energy Requirements: Current  Energy (kcal/day): 7617-4696  Weight Used for Protein Requirements: Ideal (1.2-1.5)  Protein (g/day): 66-83     Fluid (ml/day): 1200ml fluid restriction per MD    Nutrition Diagnosis:   Inadequate oral intake related to inadequate protein-energy intake as evidenced by intake 0-25%, intake 26-50%    Nutrition Interventions:   Food and/or Nutrient Delivery: Continue Current Diet, Continue Oral Nutrition Supplement  Nutrition Education/Counseling: No recommendation at this time  Coordination of Nutrition Care: Continue to monitor while inpatient  Plan of Care discussed with: Pt and family    Goals:     Goals: PO intake 50% or greater, prior to discharge       Nutrition Monitoring and Evaluation:   Behavioral-Environmental Outcomes: None Identified  Food/Nutrient Intake Outcomes: Food and Nutrient Intake, Supplement Intake  Physical Signs/Symptoms Outcomes: Biochemical Data, Nutrition Focused Physical Findings, Weight, Constipation    Discharge Planning:    Continue current diet, Continue Oral Nutrition Supplement     Rome Castle RD, LD  Contact: [phone]

## 2022-07-28 NOTE — PLAN OF CARE
Bed locked and in low position, bedside table and call light within reach, nonskid socks and bed alarm on. Ambulates to BR with assist, steady gait. Verbalizes satisfactory relief with pain meds as ordered.   Problem: Safety - Adult  Goal: Free from fall injury  7/28/2022 0348 by Lissa Sandifer, RN  Outcome: Progressing    Problem: Pain  Goal: Verbalizes/displays adequate comfort level or baseline comfort level  7/28/2022 0348 by Lissa Sandifer, RN  Outcome: Progressing

## 2022-07-28 NOTE — PROGRESS NOTES
Nephrology Note                                                                                                                                                                                                                                                                                                                                                               Office : [phone]     Fax :[phone]              Patient's Name: Hermelinda Cruz  12:40 PM  7/28/2022    Reason for Consult: CHICHI    Requesting Physician:  Pino Wei MD      Chief Complaint:        Interval History :      C/o nausea +  S/p chemo on 7/26  CHICHI improved  K 4.3  Na 130 ---> 131 ----> 129 ---> 131 ----> 138  S/p  CT-guided biopsy of the mesentery : s/o high grade serous carcinoma  For paracentesis     History of Present Ilness:    Hermelinda Cruz is a 79 y.o. female with PMH of HTN , recent diagnosis of probable ovarian Ca    C/w SOB    CT chest with contrast s/o PE BL    Nephrology consult for CHICHI management      Poor po intake of fluids +  Loose stools on Miralax +  NSAIDs +      Past Medical History:   Diagnosis Date    Anxiety     Ascites     DVT (deep venous thrombosis) (HCC)     Hypertension     Ovarian ca ([address] 07/10/2022    mets    Pulmonary embolism ([address] 07/19/2022    bilateral       Past Surgical History:   Procedure Laterality Date    COLONOSCOPY  9-8-14    colon polyp removed, diverticulosis    CT BIOPSY ABDOMEN RETROPERITONEUM  7/22/2022    CT BIOPSY ABDOMEN RETROPERITONEUM 7/22/2022 Johnny John MD [facility] CT SCAN       Family History   Problem Relation Age of Onset    Cancer Mother         Pancreatic Cancer    Diabetes Mother     High Blood Pressure Mother     Breast Cancer Sister     Breast Cancer Maternal Aunt     Breast Cancer Maternal Cousin         reports that she quit smoking about 26 years ago. Her smoking use included cigarettes. She has a 10.00 pack-year smoking history.  She has never used smokeless tobacco. She reports current alcohol use of about 2.0 standard drinks per week. She reports that she does not use drugs. Allergies:  Patient has no known allergies.     Current Medications:    simethicone (MYLICON) chewable tablet 80 mg, Q6H PRN  apixaban (ELIQUIS) tablet 5 mg, BID  lidocaine 1 % injection 5 mL, Once  sodium chloride flush 0.9 % injection 5-40 mL, 2 times per day  sodium chloride flush 0.9 % injection 5-40 mL, PRN  0.9 % sodium chloride infusion, PRN  albuterol (PROVENTIL) nebulizer solution 2.5 mg, Q6H PRN  heparin (porcine) injection 5,200 Units, PRN  heparin (porcine) injection 2,600 Units, PRN  [Held by provider] aspirin chewable tablet 81 mg, Daily  Coenzyme Q10 CAPS 10 mg (Patient Supplied), Daily  fluticasone (FLONASE) 50 MCG/ACT nasal spray 1 spray, Daily  atorvastatin (LIPITOR) tablet 10 mg, Daily  morphine (MSIR) tablet 15 mg, Q4H PRN  therapeutic multivitamin-minerals 1 tablet, Daily  pantoprazole (PROTONIX) tablet 40 mg, QAM AC  [Held by provider] sennosides-docusate sodium (SENOKOT-S) 8.6-50 MG tablet 2 tablet, Daily  sertraline (ZOLOFT) tablet 25 mg, Daily  0.9 % sodium chloride infusion, PRN  ondansetron (ZOFRAN-ODT) disintegrating tablet 4 mg, Q8H PRN   Or  ondansetron (ZOFRAN) injection 4 mg, Q6H PRN  acetaminophen (TYLENOL) tablet 650 mg, Q6H PRN   Or  acetaminophen (TYLENOL) suppository 650 mg, Q6H PRN  perflutren lipid microspheres (DEFINITY) injection 1.65 mg, ONCE PRN  senna (SENOKOT) tablet 8.6 mg, BID PRN  midodrine (PROAMATINE) tablet 10 mg, TID WC  polyethylene glycol (GLYCOLAX) packet 17 g, Daily      Review of Systems:   14 point ROS obtained but were negative except mentioned in HPI      Physical exam:     Vitals:  /74   Pulse 100   Temp 98.4 °F (36.9 °C) (Oral)   Resp 18   Ht 5' 4\" (1.626 m)   Wt 190 lb 11.2 oz (86.5 kg)   LMP  (LMP Unknown)   SpO2 94%   BMI 32.73 kg/m²   Constitutional:  OAA X3 NAD  Skin: no rash, turgor wnl  Heent:  eomi, mmm  Neck: no bruits or jvd noted  Cardiovascular:  S1, S2 without m/r/g  Respiratory: CTA B without w/r/r  Abdomen:  +bs, soft, nt, nd  Ext:  no lower extremity edema  Psychiatric: mood and affect appropriate  Musculoskeletal:  Rom, muscular strength intact    Data:   Labs:  CBC:   No results for input(s): WBC, HGB, PLT in the last 72 hours. BMP:    Recent Labs     07/27/22  0610 07/28/22  0525    137   K 5.2* 4.3    102   CO2 24 26   BUN 12 15   CREATININE <0.5* <0.5*   GLUCOSE 166* 113*       Ca/Mg/Phos:   Recent Labs     07/27/22  0610 07/28/22  0525   CALCIUM 9.4 8.7       Hepatic:   No results for input(s): AST, ALT, ALB, BILITOT, ALKPHOS in the last 72 hours. Troponin:   No results for input(s): TROPONINI in the last 72 hours. BNP: No results for input(s): BNP in the last 72 hours. Lipids: No results for input(s): CHOL, TRIG, HDL, LDLCALC, LABVLDL in the last 72 hours. ABGs: No results for input(s): PHART, PO2ART, EOD5ZCX in the last 72 hours. INR:   No results for input(s): INR in the last 72 hours. UA:  No results for input(s): Soledad Seven, GLUCOSEU, BILIRUBINUR, KETUA, SPECGRAV, BLOODU, PHUR, PROTEINU, UROBILINOGEN, NITRU, LEUKOCYTESUR, Elnita Dory in the last 72 hours. Urine Microscopic:   No results for input(s): LABCAST, BACTERIA, COMU, HYALCAST, WBCUA, RBCUA, EPIU in the last 72 hours. Urine Culture: No results for input(s): LABURIN in the last 72 hours. Urine Chemistry:   No results for input(s): Trice Nicely, PROTEINUR, NAUR in the last 72 hours. IMAGING:  XR ABDOMEN (KUB) (SINGLE AP VIEW)   Preliminary Result   No acute process is identified. No convincing evidence of bowel obstruction. Ascites. IR PICC WO SQ PORT/PUMP > 5 YEARS   Final Result   Successful placement of PICC line. US ABDOMEN LIMITED   Final Result   Limited ultrasound of the abdomen demonstrating a small amount of intra   abdominal ascites.   Recommend comparison with clinical exam and consider   follow-up paracentesis in a few days if fluid increases for therapeutic   paracentesis. CT BIOPSY ABDOMEN RETROPERITONEUM   Final Result   Successful CT guided core biopsy of mesenteric nodularity. CT GUIDED NEEDLE PLACEMENT   Final Result   Successful CT guided core biopsy of mesenteric nodularity. VL Extremity Venous Bilateral   Final Result      CT CHEST PULMONARY EMBOLISM W CONTRAST   Final Result   Chest:      Bilateral pulmonary emboli, left greater than right, with bilateral pleural   effusions, left greater than right and adjacent consolidation of the lungs,   likely atelectasis. There is mild dilatation of the right heart, IVC and   hepatic veins suggesting a component of right heart insufficiency. Results   [facility] Cu[facility] by Francheska Rodriguez. Allie Corona MD at 8:52 Am on   7/19/2022      Abdomen and pelvis:      Abdominal and pelvic ascites with findings of peritoneal carcinomatosis,   presumably due to ovarian carcinoma due to the nodular and irregular   appearance of the ovaries. CT ABDOMEN PELVIS W IV CONTRAST Additional Contrast? None   Final Result   Chest:      Bilateral pulmonary emboli, left greater than right, with bilateral pleural   effusions, left greater than right and adjacent consolidation of the lungs,   likely atelectasis. There is mild dilatation of the right heart, IVC and   hepatic veins suggesting a component of right heart insufficiency. Results   discussed with Gab[employer] NOLVIA Cueto by Francheska Rodriguez. Allie Corona MD at 8:52 Am on   7/19/2022      Abdomen and pelvis:      Abdominal and pelvic ascites with findings of peritoneal carcinomatosis,   presumably due to ovarian carcinoma due to the nodular and irregular   appearance of the ovaries. XR CHEST PORTABLE   Final Result   No acute process.       Bibasilar hypoaeration             Assessment/Plan     Acute renal failure on CKD 3a    Etiology could be RAAS blockade from ARB , intravascular volume depletion from poor po intake , NSAIDs exposure     Component of ATN from hypotension     UA : protein trace    Urine Na < 20     Plan       Hold ACEI or ARB    CHICHI Improved        Avoid NSAIDs  Avoid contrast  Avoid hypotension   Keep MAP > 65 mmhg    Continue Midodrine TID      Hyponatremia :    127 ----> 130 ---> 131 ---> 129 ---> 131 ----> 137      DC salt tab    Hyperkalemia : better  Low K diet    S/p Lokelma         PE bilateral  On heparin drip    ovarian ca  Management per oncology         HTN  Hold BP meds  Continue Midodrine              Thank you for allowing us to participate in care of Julian Bui MD  Feel free to contact me   [facility] [address]  Office : [phone]  Fax :[phone]

## 2022-07-28 NOTE — CARE COORDINATION
Case Management/Follow up:    Chart reviewed for length of stay. Hospital day #  9 Unit:  C3  Diagnosis and current status as per MD progress: held this date re pain and destination in Abd per MD  Anticipated d/c date:07/29  Expected plan for discharge:Home with friend/family support and Bacon randi [facility] following for RW need and 02  Potential barriers: clinical improvement, will need walk test RN aware   Confirmed plan with patient and/or family: yes 07/27    . ARABELLA Cabrales

## 2022-07-28 NOTE — PROGRESS NOTES
Hospitalist Progress Note      PCP: Beverly Willis MD    Date of Admission: 7/19/2022    Chief Complaint:   Abd discomfort    Hospital Course:      79 y.o. female who presented to [facility] with increasing shortness of breath. Patient recently diagnosed with ovarian cancer on 7/10/2022. Patient had presented to the emergency department with increasing abdominal distention. CAT scan shows metastatic ovarian CA. Patient was scheduled to see Dr. Madelin Mullins in [facility] tomorrow. Over the past week patient with progressive shortness of breath. She states yesterday it became severe. She denies history of COPD. Remote tobacco history. No oxygen use at home. Patient has been using morphine for pain control. Abdominal distention secondary to ascites per previous CT scan. Patient denies any swelling in her legs although she has had some discomfort in her upper thighs. Her last BM was this morning no blood or melena noted. Subjective:   Patient with some abdominal discomfort today. She does have some nausea. No emesis.     Medications:  Reviewed    Infusion Medications    sodium chloride      sodium chloride       Scheduled Medications    apixaban  5 mg Oral BID    lidocaine 1 % injection  5 mL IntraDERmal Once    sodium chloride flush  5-40 mL IntraVENous 2 times per day    [Held by provider] aspirin  81 mg Oral Daily    Coenzyme Q10  10 mg Oral Daily    fluticasone  1 spray Each Nostril Daily    atorvastatin  10 mg Oral Daily    therapeutic multivitamin-minerals  1 tablet Oral Daily    pantoprazole  40 mg Oral QAM AC    sennosides-docusate sodium  2 tablet Oral Daily    sertraline  25 mg Oral Daily    midodrine  10 mg Oral TID WC    polyethylene glycol  17 g Oral Daily     PRN Meds: simethicone, sodium chloride flush, sodium chloride, albuterol, heparin (porcine), heparin (porcine), morphine, sodium chloride, ondansetron **OR** ondansetron, acetaminophen **OR** acetaminophen, perflutren lipid microspheres, senna      Intake/Output Summary (Last 24 hours) at 7/27/2022 2037  Last data filed at 7/27/2022 1602  Gross per 24 hour   Intake 240 ml   Output 700 ml   Net -460 ml         Physical Exam Performed:    /89   Pulse (!) 105   Temp 98.1 °F (36.7 °C) (Oral)   Resp 16   Ht 5' 4\" (1.626 m)   Wt 194 lb (88 kg)   LMP  (LMP Unknown)   SpO2 92%   BMI 33.30 kg/m²     General appearance: No acute distress, appears stated age and cooperative. HEENT: Pupils equal, round, and reactive to light. Conjunctivae/corneas clear. Neck: Supple, with full range of motion. No jugular venous distention. Trachea midline. Respiratory:  Normal respiratory effort. Clear to auscultation, bilaterally without Rales/Wheezes/Rhonchi. Cardiovascular: Regular rate and rhythm with normal S1/S2 without murmurs, rubs or gallops. Abdomen: Distended but soft. Nontender. Musculoskeletal: No clubbing, cyanosis. Edema bilaterally LE. Full range of motion without deformity. Skin: Skin color, texture, turgor normal.  No rashes or lesions. Neurologic:  Neurovascularly intact without any focal sensory/motor deficits. Cranial nerves: II-XII intact, grossly non-focal.  Psychiatric: Alert and oriented, thought content appropriate, normal insight  Capillary Refill: Brisk,3 seconds, normal   Peripheral Pulses: +2 palpable, equal bilaterally       Labs:   No results for input(s): WBC, HGB, HCT, PLT in the last 72 hours. Recent Labs     07/25/22  0451 07/27/22  0610   * 138   K 4.9 5.2*   CL 95* 101   CO2 26 24   BUN 10 12   CREATININE <0.5* <0.5*   CALCIUM 8.9 9.4       No results for input(s): AST, ALT, BILIDIR, BILITOT, ALKPHOS in the last 72 hours. No results for input(s): INR in the last 72 hours. No results for input(s): Mireya Horn in the last 72 hours.       Urinalysis:      Lab Results   Component Value Date/Time    NITRU Negative 07/19/2022 02:52 PM    WBCUA 3-5 07/19/2022 02:52 PM    BACTERIA Rare Fernando Mendieta. Janay Bruno MD at 8:52 Am on   7/19/2022      Abdomen and pelvis:      Abdominal and pelvic ascites with findings of peritoneal carcinomatosis,   presumably due to ovarian carcinoma due to the nodular and irregular   appearance of the ovaries. XR CHEST PORTABLE   Final Result   No acute process. Bibasilar hypoaeration                 Assessment/Plan:    Active Hospital Problems    Diagnosis     Hyponatremia [E87.1]      Priority: Medium    Normocytic normochromic anemia [D64.9]      Priority: Medium    Ovarian cancer ([address] [C56.9]      Priority: Medium    Acute deep vein thrombosis (DVT) of proximal vein of both lower extremities (HCC) [I82.4Y3]      Priority: Medium    Acute pulmonary embolism ([address] [I26.99]      Priority: Medium    Peritoneal carcinomatosis ([address] [C78.6]      Priority: Medium     1. Bilateral pulmonary emboli. Pulmonary following. Heme-onc following. Lower extremity Dopplers positive for DVT. Vascular surgery placed IVC filter. Continue Eliquis. 2.  Elevated troponin. Secondary to PE per cardiology. 3.  Ovarian cancer. Heme-onc following. Gyn/Onc consulted.  >[id number]. Status post CT-guided mesenteric biopsy. Mullerian origin carcinoma. Patient started on chemo while inpatient. 4.  Hypertension. Blood pressure improved. Will monitor. 5.  Abdominal distention. This may be due to intra-abdominal process. Patient with some ascites. She also has gas pain. We will start simethicone. DVT Prophylaxis: Eliquis  Diet: ADULT ORAL NUTRITION SUPPLEMENT; Breakfast, Lunch, Dinner; Clear Liquid Oral Supplement  ADULT DIET; Regular; No Added Salt (3-4 gm); 1200 ml  ADULT ORAL NUTRITION SUPPLEMENT; Lunch, Dinner; Frozen Oral Supplement  Code Status: Full Code    PT/OT Eval Status: Pending    Dispo - Home when stable possibly tomorrow.     Dee Quiñonez MD

## 2022-07-28 NOTE — PROGRESS NOTES
Physical Therapy  Facility/Department: [facility] C3 TELE/MED SURG/ONC  Daily Treatment Note  NAME: Yong Green  : 1955  MRN: [id number]    Date of Service: 2022    Discharge Recommendations:  Home with assist PRN, Home with Home health PT   PT Equipment Recommendations  Equipment Needed: Yes  Mobility Devices: Ольга Zhou: Rolling    AMPAC 6 Clicks Inpatient Mobility:  AM-PAC Mobility Inpatient   How much difficulty turning over in bed?: None  How much difficulty sitting down on / standing up from a chair with arms?: A Little  How much difficulty moving from lying on back to sitting on side of bed?: None  How much help from another person moving to and from a bed to a chair?: A Little  How much help from another person needed to walk in hospital room?: A Little  How much help from another person for climbing 3-5 steps with a railing?: A Little  AM-PAC Inpatient Mobility Raw Score : 20  AM-PAC Inpatient T-Scale Score : 47.67  Mobility Inpatient CMS 0-100% Score: 35.83  Mobility Inpatient CMS G-Code Modifier : CJ    Patient Diagnosis(es): The primary encounter diagnosis was Acute pulmonary embolism, unspecified pulmonary embolism type, unspecified whether acute cor pulmonale present ([facility] A diagnosis of Elevated troponin was also pertinent to this visit. Assessment   Assessment: Pt continues to require CGA/SBA for transfers and ambulation. Pt used RW this date for ambulation and demonstrated improved gait quality and balance. Pt would benefit from RW at home. Continue to recommend home PT and PRN A at discharge. Activity Tolerance: Patient tolerated treatment well;Patient limited by endurance  Equipment Needed: Yes  Mobility Devices: [address]: 3-5 times per week  Specific Instructions for Next Treatment: Progress ther ex and mobility as tolerated  Current Treatment Recommendations: Strengthening;Balance training;Functional mobility training;Transfer training; Endurance training;Gait training;Stair training;Home exercise program;Safety education & training;Patient/Caregiver education & training; Therapeutic activities     Restrictions  Restrictions/Precautions  Restrictions/Precautions: General Precautions  Position Activity Restriction  Other position/activity restrictions: Medium fall risk per nursing assessment, up with assistance, IV lines, PICC LUE, 3L O2, telemetry     Subjective    Subjective  Subjective: Pt agreeable to therapy. Pain: C/o abdominal pain and nausea with mobility; does not rate pain. Objective   Vitals  Heart Rate: 100  BP: 114/74  MAP (Calculated): 87.33  SpO2: 94 %  O2 Device: Nasal cannula  Bed Mobility Training  Bed Mobility Training: Yes  Supine to Sit: Supervision  Sit to Supine: Supervision  Scooting: Supervision  Balance  Sitting: Intact  Standing: Intact  Transfer Training  Overall Level of Assistance: Stand-by assistance  Sit to Stand: Stand-by assistance  Stand to Sit: Stand-by assistance  Gait Training  Gait Training: Yes  Gait  Overall Level of Assistance: Stand-by assistance  Speed/Fabienne: Pace decreased (< 100 feet/min); Slow  Step Length: Left shortened;Right shortened  Distance (ft): 40 Feet (distance limited by fatigue and pain)  Assistive Device: Walker, rolling     PT Exercises  Exercise Treatment: Seated ther ex x10 reps: ankle pumps, LAQ, hip flexion     Safety Devices  Type of Devices: All fall risk precautions in place;Call light within reach;Gait belt;Nurse notified; Bed alarm in place; Patient at risk for falls; Left in bed     Goals  Short Term Goals  Time Frame for Short term goals: 1 week, 8/02/22 (unless otherwise specified)  Short term goal 1: Pt will transfer supine <-> sit with modified(I)  Short term goal 2: Pt will transfer sit <-> stand and bed>chair without AD with modified(I)  Short term goal 3: Pt will ambulate x 200 feet without AD with supervision/modified(I)  Short term goal 4: By 7/29/22: Pt will tolerate 12-15 reps BLE exercise for strengthening, balance, and endurance  Short term goal 5: Pt will ambulate up/down 3 steps using 1 handrail with supervision  Patient Goals   Patient goals :  \"To get stronger and go home\"    Education  Patient Education  Education Given To: Patient  Education Provided: Role of Therapy;Plan of Care;Precautions;Transfer Training;Energy Conservation;Home Exercise Program;Equipment  Education Provided Comments: Educated on use of RW  Education Method: Demonstration;Verbal  Barriers to Learning: None  Education Outcome: Verbalized understanding;Demonstrated understanding    Therapy Time   Individual Concurrent Group Co-treatment   Time In 69 [phone] 60         Time Out 0820         Minutes 23         Timed Code Treatment Minutes: [address] [facility],   [facility]

## 2022-07-28 NOTE — ONCOLOGY
ONCOLOGY HEMATOLOGY CARE PROGRESS NOTE      SUBJECTIVE:     Afebrile and on 3 LPM by NC. No emesis. No mouth sores. Some loose stools. ROS:   The remaining 10 point review of symptoms is unremarkable. OBJECTIVE        Physical    VITALS:  BP (!) 148/92   Pulse (!) 108   Temp 98.5 °F (36.9 °C) (Oral)   Resp 18   Ht 5' 4\" (1.626 m)   Wt 190 lb 11.2 oz (86.5 kg)   LMP  (LMP Unknown)   SpO2 96%   BMI 32.73 kg/m²   TEMPERATURE:  Current - Temp: 98.5 °F (36.9 °C); Max - Temp  Av °F (36.7 °C)  Min: 97.6 °F (36.4 °C)  Max: 98.5 °F (36.9 °C)  PULSE OXIMETRY RANGE: SpO2  Av.3 %  Min: 92 %  Max: 96 %  24HR INTAKE/OUTPUT:    Intake/Output Summary (Last 24 hours) at 2022 1202  Last data filed at 2022 2136  Gross per 24 hour   Intake 280 ml   Output 400 ml   Net -120 ml         CONSTITUTIONAL:  awake, alert, cooperative, no apparent distress, HEENT oral pharynx , no scleral icterus  HEMATOLOGIC/LYMPHATICS:  no cervical lymphadenopathy, no supraclavicular lymphadenopathy, no axillary lymphadenopathy and no inguinal lymphadenopathy  LUNGS:  No increased work of breathing, good air exchange, clear to auscultation bilaterally, no crackles or wheezing  CARDIOVASCULAR:  , regular rate and rhythm, normal S1 and S2, no S3 or S4, and no murmur noted  ABDOMEN:  No scars, normal bowel sounds, soft, non-distended, non-tender, no masses palpated, no hepatosplenomegally. The abdomen is distended and unchanged. MUSCULOSKELETAL:  There is no redness, warmth, or swelling of the joints. EXTREMETIES: No clubbing cynosis or edema  NEUROLOGIC:  Awake, alert, oriented to name, place and time. Cranial nerves II-XII are grossly intact. Motor is 5 out of 5 bilaterally. SKIN:  no bruising or bleeding      Data      No results for input(s): WBC, HGB, HCT, PLT, MCV in the last 72 hours.        Recent Labs     22  0610 22  0525    137   K 5.2* 4.3    102 CO2 24 26   BUN 12 15   CREATININE <0.5* <0.5*       No results for input(s): AST, ALT, ALB, BILIDIR, BILITOT, ALKPHOS in the last 72 hours. Magnesium:    Lab Results   Component Value Date/Time    MG 2.10 07/07/2022 09:28 AM         Problem List  Patient Active Problem List   Diagnosis    HTN (hypertension)    HLD (hyperlipidemia)    Low back pain    Benign neoplasm of colon    Swelling, mass, or lump in head and neck    Chronic sinusitis    BERNIE (generalized anxiety disorder)    Major depressive disorder with single episode, in full remission ([facility]    Generalized abdominal pain    Adnexal mass    Peritoneal carcinomatosis (HCC)    Malignant ascites    Anorexia    Reactive depression    Acute pulmonary embolism (HCC)    Acute deep vein thrombosis (DVT) of proximal vein of both lower extremities (HCC)    Hyponatremia    Normocytic normochromic anemia    Ovarian cancer ([facility] [address]       ASSESSMENT AND PLAN  1.) Probable ovarian cancer  - CA-125 63049 U/mL, 7/20/2022.  - CT abdomen/pelvis, 7/19/2022, showed abdominoperitoneal carcinomatosis with irregular/nodular ovaries bilaterally. Moderate ascites was present. - CT-guided mesenteric biopsy, 7/22/2022. 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 - US abdomen, 7/25/2022, showed too little ascites for paracentesis. - Left brachial vein PICC placed, 7/25/2022. - Carbo/Taxol #1 given on 7/26/2022. Due again on 8/16/2022.     2.) PE  - CTPA, 7/19/2022, showed bilateral PEs left greater than right.  - Duplex ultrasound of the BLE, 7/19/2022, showed SVT of right greater saphenous vein extending to 1.15 cm from SFJ. DVT of left distal femoral, popliteal, gastrocnemius, posterior tibial, peroneal veins.  - IVC filter placed, 7/20/2022, with Dr. Thelma Vivar.  - Treating with Eliquis. 3.) HTN    4.) Loose stools  - Hold Senokot-S.      ONCOLOGIC DISPOSITION:  No oncologic barrier to discharge.     [employer], MD  May be reached through [address] [place] [address]

## 2022-07-29 ENCOUNTER — TELEPHONE (OUTPATIENT)
Dept: SURGERY | Age: 67
End: 2022-07-29

## 2022-07-29 VITALS
WEIGHT: 190.9 LBS | DIASTOLIC BLOOD PRESSURE: 75 MMHG | TEMPERATURE: 98.1 F | OXYGEN SATURATION: 88 % | RESPIRATION RATE: 16 BRPM | BODY MASS INDEX: 32.59 KG/M2 | HEIGHT: 64 IN | SYSTOLIC BLOOD PRESSURE: 120 MMHG | HEART RATE: 100 BPM

## 2022-07-29 LAB
ANION GAP SERPL CALCULATED.3IONS-SCNC: 9 MMOL/L (ref 3–16)
BUN BLDV-MCNC: 16 MG/DL (ref 7–20)
CALCIUM SERPL-MCNC: 8.7 MG/DL (ref 8.3–10.6)
CHLORIDE BLD-SCNC: 97 MMOL/L (ref 99–110)
CO2: 24 MMOL/L (ref 21–32)
CREAT SERPL-MCNC: <0.5 MG/DL (ref 0.6–1.2)
GFR AFRICAN AMERICAN: >60
GFR NON-AFRICAN AMERICAN: >60
GLUCOSE BLD-MCNC: 120 MG/DL (ref 70–99)
POTASSIUM SERPL-SCNC: 4.6 MMOL/L (ref 3.5–5.1)
SODIUM BLD-SCNC: 130 MMOL/L (ref 136–145)

## 2022-07-29 PROCEDURE — 2580000003 HC RX 258: Performed by: INTERNAL MEDICINE

## 2022-07-29 PROCEDURE — 97530 THERAPEUTIC ACTIVITIES: CPT

## 2022-07-29 PROCEDURE — 97535 SELF CARE MNGMENT TRAINING: CPT

## 2022-07-29 PROCEDURE — 2700000000 HC OXYGEN THERAPY PER DAY

## 2022-07-29 PROCEDURE — 6370000000 HC RX 637 (ALT 250 FOR IP): Performed by: INTERNAL MEDICINE

## 2022-07-29 PROCEDURE — 97110 THERAPEUTIC EXERCISES: CPT

## 2022-07-29 PROCEDURE — 97116 GAIT TRAINING THERAPY: CPT

## 2022-07-29 PROCEDURE — 94761 N-INVAS EAR/PLS OXIMETRY MLT: CPT

## 2022-07-29 PROCEDURE — 6360000002 HC RX W HCPCS: Performed by: INTERNAL MEDICINE

## 2022-07-29 PROCEDURE — 6370000000 HC RX 637 (ALT 250 FOR IP): Performed by: HOSPITALIST

## 2022-07-29 PROCEDURE — 36415 COLL VENOUS BLD VENIPUNCTURE: CPT

## 2022-07-29 PROCEDURE — 80048 BASIC METABOLIC PNL TOTAL CA: CPT

## 2022-07-29 PROCEDURE — 99232 SBSQ HOSP IP/OBS MODERATE 35: CPT | Performed by: HOSPITALIST

## 2022-07-29 RX ORDER — SODIUM CHLORIDE 1000 MG
1 TABLET, SOLUBLE MISCELLANEOUS 2 TIMES DAILY WITH MEALS
Qty: 90 TABLET | Refills: 3 | Status: SHIPPED | OUTPATIENT
Start: 2022-07-29

## 2022-07-29 RX ORDER — SODIUM CHLORIDE 1000 MG
1 TABLET, SOLUBLE MISCELLANEOUS 2 TIMES DAILY WITH MEALS
Status: DISCONTINUED | OUTPATIENT
Start: 2022-07-29 | End: 2022-07-29 | Stop reason: HOSPADM

## 2022-07-29 RX ORDER — MORPHINE SULFATE 15 MG/1
15 TABLET ORAL EVERY 4 HOURS PRN
Qty: 18 TABLET | Refills: 0 | Status: SHIPPED | OUTPATIENT
Start: 2022-07-29 | End: 2022-08-01

## 2022-07-29 RX ORDER — SIMETHICONE 80 MG
80 TABLET,CHEWABLE ORAL EVERY 6 HOURS PRN
Qty: 180 TABLET | Refills: 3 | Status: SHIPPED | OUTPATIENT
Start: 2022-07-29

## 2022-07-29 RX ORDER — MIDODRINE HYDROCHLORIDE 10 MG/1
10 TABLET ORAL
Qty: 90 TABLET | Refills: 3 | Status: SHIPPED | OUTPATIENT
Start: 2022-07-29

## 2022-07-29 RX ADMIN — MIDODRINE HYDROCHLORIDE 10 MG: 5 TABLET ORAL at 12:00

## 2022-07-29 RX ADMIN — Medication 1 G: at 08:27

## 2022-07-29 RX ADMIN — MIDODRINE HYDROCHLORIDE 10 MG: 5 TABLET ORAL at 08:28

## 2022-07-29 RX ADMIN — Medication 1 TABLET: at 10:30

## 2022-07-29 RX ADMIN — PANTOPRAZOLE SODIUM 40 MG: 40 TABLET, DELAYED RELEASE ORAL at 05:27

## 2022-07-29 RX ADMIN — ONDANSETRON 4 MG: 2 INJECTION INTRAMUSCULAR; INTRAVENOUS at 08:50

## 2022-07-29 RX ADMIN — ONDANSETRON 4 MG: 4 TABLET, ORALLY DISINTEGRATING ORAL at 17:13

## 2022-07-29 RX ADMIN — MORPHINE SULFATE 15 MG: 15 TABLET ORAL at 13:17

## 2022-07-29 RX ADMIN — SERTRALINE 25 MG: 50 TABLET, FILM COATED ORAL at 10:27

## 2022-07-29 RX ADMIN — MORPHINE SULFATE 15 MG: 15 TABLET ORAL at 03:43

## 2022-07-29 RX ADMIN — MORPHINE SULFATE 15 MG: 15 TABLET ORAL at 17:13

## 2022-07-29 RX ADMIN — SODIUM CHLORIDE, PRESERVATIVE FREE 10 ML: 5 INJECTION INTRAVENOUS at 10:29

## 2022-07-29 RX ADMIN — MORPHINE SULFATE 15 MG: 15 TABLET ORAL at 08:50

## 2022-07-29 RX ADMIN — APIXABAN 5 MG: 5 TABLET, FILM COATED ORAL at 09:27

## 2022-07-29 ASSESSMENT — PAIN SCALES - GENERAL
PAINLEVEL_OUTOF10: 0
PAINLEVEL_OUTOF10: 7
PAINLEVEL_OUTOF10: 4

## 2022-07-29 ASSESSMENT — PAIN DESCRIPTION - LOCATION
LOCATION: ABDOMEN

## 2022-07-29 ASSESSMENT — PAIN DESCRIPTION - DESCRIPTORS
DESCRIPTORS: ACHING
DESCRIPTORS: ACHING

## 2022-07-29 NOTE — PROGRESS NOTES
Nephrology Note                                                                                                                                                                                                                                                                                                                                                               Office : [phone]     Fax :[phone]              Patient's Name: Woodrow Mayberry  10:45 AM  7/29/2022    Reason for Consult: CHICHI    Requesting Physician:  Bienvenido Maddox MD      Chief Complaint:        Interval History :        S/p chemo on 7/26  CHICHI improved  K 4.3  Na 130 ---> 131 ----> 129 ---> 131 ----> 138 ----> 130  S/p  CT-guided biopsy of the mesentery : s/o high grade serous carcinoma  For paracentesis     History of Present Ilness:    Woodrow Mayberry is a 79 y.o. female with PMH of HTN , recent diagnosis of probable ovarian Ca    C/w SOB    CT chest with contrast s/o PE BL    Nephrology consult for CHICHI management      Poor po intake of fluids +  Loose stools on Miralax +  NSAIDs +      Past Medical History:   Diagnosis Date    Anxiety     Ascites     DVT (deep venous thrombosis) (HCC)     Hypertension     Ovarian ca ([address] 07/10/2022    mets    Pulmonary embolism ([address] 07/19/2022    bilateral       Past Surgical History:   Procedure Laterality Date    COLONOSCOPY  9-8-14    colon polyp removed, diverticulosis    CT BIOPSY ABDOMEN RETROPERITONEUM  7/22/2022    CT BIOPSY ABDOMEN RETROPERITONEUM 7/22/2022 Ben Platt MD [facility] CT SCAN       Family History   Problem Relation Age of Onset    Cancer Mother         Pancreatic Cancer    Diabetes Mother     High Blood Pressure Mother     Breast Cancer Sister     Breast Cancer Maternal Aunt     Breast Cancer Maternal Cousin         reports that she quit smoking about 26 years ago. Her smoking use included cigarettes. She has a 10.00 pack-year smoking history.  She has never used smokeless tobacco. She reports current alcohol use of about 2.0 standard drinks per week. She reports that she does not use drugs. Allergies:  Patient has no known allergies.     Current Medications:    sodium chloride tablet 1 g, BID WC  simethicone (MYLICON) chewable tablet 80 mg, Q6H PRN  apixaban (ELIQUIS) tablet 5 mg, BID  lidocaine 1 % injection 5 mL, Once  sodium chloride flush 0.9 % injection 5-40 mL, 2 times per day  sodium chloride flush 0.9 % injection 5-40 mL, PRN  0.9 % sodium chloride infusion, PRN  albuterol (PROVENTIL) nebulizer solution 2.5 mg, Q6H PRN  heparin (porcine) injection 5,200 Units, PRN  heparin (porcine) injection 2,600 Units, PRN  [Held by provider] aspirin chewable tablet 81 mg, Daily  Coenzyme Q10 CAPS 10 mg (Patient Supplied), Daily  fluticasone (FLONASE) 50 MCG/ACT nasal spray 1 spray, Daily  atorvastatin (LIPITOR) tablet 10 mg, Daily  morphine (MSIR) tablet 15 mg, Q4H PRN  therapeutic multivitamin-minerals 1 tablet, Daily  pantoprazole (PROTONIX) tablet 40 mg, QAM AC  [Held by provider] sennosides-docusate sodium (SENOKOT-S) 8.6-50 MG tablet 2 tablet, Daily  sertraline (ZOLOFT) tablet 25 mg, Daily  0.9 % sodium chloride infusion, PRN  ondansetron (ZOFRAN-ODT) disintegrating tablet 4 mg, Q8H PRN   Or  ondansetron (ZOFRAN) injection 4 mg, Q6H PRN  acetaminophen (TYLENOL) tablet 650 mg, Q6H PRN   Or  acetaminophen (TYLENOL) suppository 650 mg, Q6H PRN  perflutren lipid microspheres (DEFINITY) injection 1.65 mg, ONCE PRN  senna (SENOKOT) tablet 8.6 mg, BID PRN  midodrine (PROAMATINE) tablet 10 mg, TID WC  polyethylene glycol (GLYCOLAX) packet 17 g, Daily      Review of Systems:   14 point ROS obtained but were negative except mentioned in HPI      Physical exam:     Vitals:  /77   Pulse (!) 115   Temp 98.1 °F (36.7 °C) (Oral)   Resp 16   Ht 5' 4\" (1.626 m)   Wt 190 lb 14.4 oz (86.6 kg)   LMP  (LMP Unknown)   SpO2 94%   BMI 32.77 kg/m²   Constitutional:  OAA X3 NAD  Skin: no rash, turgor wnl  Heent:  eomi, mmm  Neck: no bruits or jvd noted  Cardiovascular:  S1, S2 without m/r/g  Respiratory: CTA B without w/r/r  Abdomen:  +bs, soft, nt, nd  Ext:  no lower extremity edema  Psychiatric: mood and affect appropriate  Musculoskeletal:  Rom, muscular strength intact    Data:   Labs:  CBC:   No results for input(s): WBC, HGB, PLT in the last 72 hours. BMP:    Recent Labs     07/27/22  0610 07/28/22  0525 07/29/22  0511    137 130*   K 5.2* 4.3 4.6    102 97*   CO2 24 26 24   BUN 12 15 16   CREATININE <0.5* <0.5* <0.5*   GLUCOSE 166* 113* 120*       Ca/Mg/Phos:   Recent Labs     07/27/22 0610 07/28/22 0525 07/29/22  0511   CALCIUM 9.4 8.7 8.7       Hepatic:   No results for input(s): AST, ALT, ALB, BILITOT, ALKPHOS in the last 72 hours. Troponin:   No results for input(s): TROPONINI in the last 72 hours. BNP: No results for input(s): BNP in the last 72 hours. Lipids: No results for input(s): CHOL, TRIG, HDL, LDLCALC, LABVLDL in the last 72 hours. ABGs: No results for input(s): PHART, PO2ART, XTV2OWU in the last 72 hours. INR:   No results for input(s): INR in the last 72 hours. UA:  No results for input(s): Delwin Minion, GLUCOSEU, BILIRUBINUR, KETUA, SPECGRAV, BLOODU, PHUR, PROTEINU, UROBILINOGEN, NITRU, LEUKOCYTESUR, Sendy Corti in the last 72 hours. Urine Microscopic:   No results for input(s): LABCAST, BACTERIA, COMU, HYALCAST, WBCUA, RBCUA, EPIU in the last 72 hours. Urine Culture: No results for input(s): LABURIN in the last 72 hours. Urine Chemistry:   No results for input(s): Volney Ly, PROTEINUR, NAUR in the last 72 hours. IMAGING:  US ABDOMEN LIMITED   Preliminary Result   Cancellation of scheduled paracentesis due to lack of significant ascites as   described above. XR ABDOMEN (KUB) (SINGLE AP VIEW)   Preliminary Result   No acute process is identified. No convincing evidence of bowel obstruction. Ascites.          IR PICC WO nodular and irregular   appearance of the ovaries. XR CHEST PORTABLE   Final Result   No acute process.       Bibasilar hypoaeration             Assessment/Plan     Acute renal failure on CKD 3a    Etiology could be RAAS blockade from ARB , intravascular volume depletion from poor po intake , NSAIDs exposure     Component of ATN from hypotension     UA : protein trace    Urine Na < 20     Plan         CHICHI Improved    Avoid NSAIDs  Avoid contrast  Avoid hypotension   Keep MAP > 65 mmhg    Continue Midodrine TID      Hyponatremia :    127 ----> 130 ---> 131 ---> 129 ---> 131 ----> 137 ---> 130    Start salt tab 1 gram PO BID    Hyperkalemia : better  Low K diet    S/p Lokelma         PE bilateral  On heparin drip    ovarian ca  Management per oncology         HTN  Hold BP meds  Continue Midodrine              Thank you for allowing us to participate in care of Akin Vazquez MD  Feel free to contact me   [facility] [address]  Office : [phone]  Fax :[phone]

## 2022-07-29 NOTE — DISCHARGE SUMMARY
Hospital Medicine Discharge Summary    Patient ID: Adan Machuca      Patient's PCP: Jaya Guzman MD    Admit Date: 7/19/2022     Discharge Date:   7/29/2022    Admitting Provider: Woo Bruno MD     Discharge Provider: Joaquín Gutierrez MD     Discharge Diagnoses: Active Hospital Problems    Diagnosis     Hyponatremia [E87.1]      Priority: Medium    Normocytic normochromic anemia [D64.9]      Priority: Medium    Ovarian cancer ([facility] [C56.9]      Priority: Medium    Acute deep vein thrombosis (DVT) of proximal vein of both lower extremities (HCC) [I82.4Y3]      Priority: Medium    Acute pulmonary embolism ([address] [I26.99]      Priority: Medium    Peritoneal carcinomatosis ([facility] [C78.6]      Priority: Medium       The patient was seen and examined on day of discharge and this discharge summary is in conjunction with any daily progress note from day of discharge. Hospital Course:       79 y.o. female who presented to [facility] with increasing shortness of breath. Patient recently diagnosed with ovarian cancer on 7/10/2022. Patient had presented to the emergency department with increasing abdominal distention. CAT scan shows metastatic ovarian CA. Patient was scheduled to see Dr. Vasquez Navarro in [facility] tomorrow. Over the past week patient with progressive shortness of breath. She states yesterday it became severe. She denies history of COPD. Remote tobacco history. No oxygen use at home. Patient has been using morphine for pain control. Abdominal distention secondary to ascites per previous CT scan. Patient denies any swelling in her legs although she has had some discomfort in her upper thighs. Her last BM was this morning no blood or melena noted. Treated for:    1. Bilateral pulmonary emboli. Pulmonary following. Heme-onc following. Lower extremity Dopplers positive for DVT. Vascular surgery placed IVC filter. Continue Eliquis. 2.  Elevated troponin.   Secondary to PE per cardiology. 3.  Ovarian cancer. Heme-onc following. Gyn/Onc consulted.  >[id number]. Status post CT-guided mesenteric biopsy. Mullerian origin carcinoma. Patient started on chemo while inpatient. 4.  Hypertension. Blood pressure improved. Monitored. 5.  Abdominal distention. KUB no obstruction. Ultrasound insufficient ascites to drain. Physical Exam Performed:     /75   Pulse 100   Temp 98.1 °F (36.7 °C) (Oral)   Resp 16   Ht 5' 4\" (1.626 m)   Wt 190 lb 14.4 oz (86.6 kg)   LMP  (LMP Unknown)   SpO2 (!) 88%   BMI 32.77 kg/m²         General appearance: No acute distress, appears stated age and cooperative. HEENT: Pupils equal, round, and reactive to light. Conjunctivae/corneas clear. Neck: Supple, with full range of motion. No jugular venous distention. Trachea midline. Respiratory:  Normal respiratory effort. Clear to auscultation, bilaterally without Rales/Wheezes/Rhonchi. Cardiovascular: Regular rate and rhythm with normal S1/S2 without murmurs, rubs or gallops. Abdomen: Distended and tympanic. No rebound tenderness. Musculoskeletal: No clubbing, cyanosis. Edema bilaterally LE. Full range of motion without deformity. Skin: Skin color, texture, turgor normal.  No rashes or lesions. Neurologic:  Neurovascularly intact without any focal sensory/motor deficits. Cranial nerves: II-XII intact, grossly non-focal.  Psychiatric: Alert and oriented, thought content appropriate, normal insight  Capillary Refill: Brisk,3 seconds, normal  Peripheral Pulses: +2 palpable, equal bilaterally    Labs:  For convenience and continuity at follow-up the following most recent labs are provided:      CBC:    Lab Results   Component Value Date/Time    WBC 8.2 07/22/2022 08:08 AM    HGB 10.5 07/22/2022 08:08 AM    HCT 31.0 07/22/2022 08:08 AM     07/22/2022 08:08 AM       Renal:    Lab Results   Component Value Date/Time     07/29/2022 05:11 AM    K 4.6 07/29/2022 05:11 AM    K 5.2 07/20/2022 05:18 AM    CL 97 07/29/2022 05:11 AM    CO2 24 07/29/2022 05:11 AM    BUN 16 07/29/2022 05:11 AM    CREATININE <0.5 07/29/2022 05:11 AM    CALCIUM 8.7 07/29/2022 05:11 AM         Significant Diagnostic Studies    Radiology:   US ABDOMEN LIMITED   Preliminary Result   Cancellation of scheduled paracentesis due to lack of significant ascites as   described above. XR ABDOMEN (KUB) (SINGLE AP VIEW)   Preliminary Result   No acute process is identified. No convincing evidence of bowel obstruction. Ascites. IR PICC WO SQ PORT/PUMP > 5 YEARS   Final Result   Successful placement of PICC line. US ABDOMEN LIMITED   Final Result   Limited ultrasound of the abdomen demonstrating a small amount of intra   abdominal ascites. Recommend comparison with clinical exam and consider   follow-up paracentesis in a few days if fluid increases for therapeutic   paracentesis. CT BIOPSY ABDOMEN RETROPERITONEUM   Final Result   Successful CT guided core biopsy of mesenteric nodularity. CT GUIDED NEEDLE PLACEMENT   Final Result   Successful CT guided core biopsy of mesenteric nodularity. VL Extremity Venous Bilateral   Final Result      CT CHEST PULMONARY EMBOLISM W CONTRAST   Final Result   Chest:      Bilateral pulmonary emboli, left greater than right, with bilateral pleural   effusions, left greater than right and adjacent consolidation of the lungs,   likely atelectasis. There is mild dilatation of the right heart, IVC and   hepatic veins suggesting a component of right heart insufficiency. Results   discussed with [facility] by Chai Heath. Janna Buckley MD at 8:52 Am on   7/19/2022      Abdomen and pelvis:      Abdominal and pelvic ascites with findings of peritoneal carcinomatosis,   presumably due to ovarian carcinoma due to the nodular and irregular   appearance of the ovaries.          CT ABDOMEN PELVIS W IV CONTRAST Additional Contrast? None   Final Result Chest:      Bilateral pulmonary emboli, left greater than right, with bilateral pleural   effusions, left greater than right and adjacent consolidation of the lungs,   likely atelectasis. There is mild dilatation of the right heart, IVC and   hepatic veins suggesting a component of right heart insufficiency. Results   discussed with [address] [place] [address] by Geneva Cuello. Ameena Andrews MD at 8:52 Am on   7/19/2022      Abdomen and pelvis:      Abdominal and pelvic ascites with findings of peritoneal carcinomatosis,   presumably due to ovarian carcinoma due to the nodular and irregular   appearance of the ovaries. XR CHEST PORTABLE   Final Result   No acute process.       Bibasilar hypoaeration                Consults:     IP CONSULT TO HOSPITALIST  IP CONSULT TO NEPHROLOGY  IP CONSULT TO CARDIOLOGY  IP CONSULT TO PULMONOLOGY  IP CONSULT TO HEM/ONC  IP CONSULT TO VASCULAR SURGERY  IP CONSULT TO VASCULAR SURGERY  IP CONSULT TO OB GYN  IP CONSULT TO GYNECOLOGIC ONCOLOGY  IP CONSULT TO SPIRITUAL SERVICES  IP CONSULT TO HOME CARE NEEDS    Disposition:  Home     Condition at Discharge: Stable    Discharge Instructions/Follow-up:       Follow up with Dr. Izabel Hobbs MD  [address] [place] [address] [address]  [phone]          Schedule an appointment with Dr. Yannick Malave MD as soon as possible for avisit in 1 week(s)  Specialty:Hematology and Oncology, Medical Oncology  Post hospital follow up [place] [address] [phone]          Follow up with Dr. Konstantin Clark MD in 3 week(s)  Specialty:Nephrology [address] [facility]  [phone]          Follow up with Dr. Izabel Hobbs MD in 3 week(s)  Specialty:Family Medicine  Post hospital follow up [facility])  [address] [place] [address] [address] [place] [id number] [phone]       Code Status:  Full Code     Activity: activity as tolerated    Diet: regular diet      Discharge Medications:     Current Discharge Medication List             Details   apixaban (ELIQUIS) 5 MG TABS tablet Take 1 tablet by mouth in the morning and 1 tablet before bedtime. Qty: 60 tablet, Refills: 1      sodium chloride 1 g tablet Take 1 tablet by mouth in the morning and 1 tablet in the evening. Take with meals. Qty: 90 tablet, Refills: 3      simethicone (MYLICON) 80 MG chewable tablet Take 1 tablet by mouth every 6 hours as needed for Flatulence  Qty: 180 tablet, Refills: 3      midodrine (PROAMATINE) 10 MG tablet Take 1 tablet by mouth in the morning and 1 tablet at noon and 1 tablet in the evening. Take with meals. Hold if Systolic blood pressure >472. Qty: 90 tablet, Refills: 3                Details   morphine (MSIR) 15 MG tablet Take 1 tablet by mouth every 4 hours as needed for Pain for up to 3 days. Qty: 18 tablet, Refills: 0    Comments: Reduce doses taken as pain becomes manageable  Associated Diagnoses: Generalized abdominal pain; Adnexal mass; Peritoneal carcinomatosis (HCC)      sertraline (ZOLOFT) 50 MG tablet Take 0.5 tablets by mouth in the morning. TAKE ONE TABLET BY MOUTH DAILY.   Qty: 30 tablet, Refills: 2    Associated Diagnoses: Anxiety                Details   omeprazole (PRILOSEC) 20 MG delayed release capsule Take 1 capsule by mouth every morning (before breakfast)  Qty: 30 capsule, Refills: 5    Associated Diagnoses: Anorexia      sennosides-docusate sodium (SENOKOT-S) 8.6-50 MG tablet Take 2 tablets by mouth daily  Qty: 60 tablet, Refills: 2    Associated Diagnoses: Generalized abdominal pain      polyethylene glycol (GLYCOLAX) 17 GM/SCOOP powder Take 17 g by mouth daily  Qty: 510 g, Refills: 0      lovastatin (MEVACOR) 40 MG tablet TAKE ONE TAB BY MOUTH ONCE NIGHTLY  Qty: 90 tablet, Refills: 1    Associated Diagnoses: Mixed hyperlipidemia      fluticasone (FLONASE) 50 MCG/ACT nasal spray SPRAY ONE SPRAY IN EACH NOSTRIL ONCE DAILY  Qty: 1 each, Refills: 2    Associated Diagnoses: Allergic rhinitis, unspecified seasonality, unspecified trigger      Multiple Vitamin (MULTIVITAMINS PO) Take by mouth      aspirin 81 MG chewable tablet Take 81 mg by mouth      Coenzyme Q10 10 MG CAPS Take 10 mg by mouth      Calcium Carb-Cholecalciferol (CALCIUM CARBONATE-VITAMIN D3 PO) Take by mouth             Time Spent on discharge is more than  35 minutes  in the examination, evaluation, counseling and review of medications and discharge plan. Signed:    Link Roth MD   7/29/2022      Thank you Masoud Cade MD for the opportunity to be involved in this patient's care. If you have any questions or concerns, please feel free to contact me at [phone].

## 2022-07-29 NOTE — PROGRESS NOTES
Pt discharged per order. Written and verbal discharge instructions provided to pt and family. PIVs removed without complication. PICC left in place per MD instructions. Left floor in stable condition to home with all belongings.

## 2022-07-29 NOTE — PROGRESS NOTES
Occupational Therapy  Facility/Department: [facility] C3 TELE/MED SURG/ONC  Daily Treatment Note  NAME: Govind Byrd  : 1955  MRN: [id number]    Date of Service: 2022    Discharge Recommendations:  Home with assist PRN, Home with Home health OT  OT Equipment Recommendations  Equipment Needed: Yes  Mobility Devices: ADL Assistive Devices  ADL Assistive Devices: Shower Chair with back      Patient Diagnosis(es): The primary encounter diagnosis was Acute pulmonary embolism, unspecified pulmonary embolism type, unspecified whether acute cor pulmonale present ([address] A diagnosis of Elevated troponin was also pertinent to this visit. Assessment    Assessment: Pt pleasant and agreeable however limited by pain/nausea. Pt demo good chantel of grooming and therex however needing extended rest breaks between sets of exercise. Cont per POC  Activity Tolerance: Patient tolerated treatment well;Patient limited by endurance  Discharge Recommendations: Home with assist PRN;Home with Home health OT  Equipment Needed: Yes  Mobility Devices: ADL [address]  Times per Week: 3-5x/week  Current Treatment Recommendations: Balance training;Functional mobility training; Endurance training;Self-Care / ADL; Home management training;Equipment evaluation, education, & procurement     Restrictions  Restrictions/Precautions  Restrictions/Precautions: General Precautions  Position Activity Restriction  Other position/activity restrictions: Medium fall risk per nursing assessment, up with assistance, IV lines, PICC LUE, 3L O2, telemetry    Subjective   Subjective  Subjective: Pt high-fowlers in bed on arrival. reports 8/10 pain and nausea- requesting to perform in bed therapy  Orientation  Overall Orientation Status: Within Normal Limits  Orientation Level: Oriented X4  Pain: C/o abdominal pain and nausea with mobility; does not rate pain.   Cognition  Overall Cognitive Status: WFL        Objective Vitals  Vitals  Heart Rate: 100  BP: 120/75  BP Location: Right upper arm  BP Method: Automatic  Patient Position: Semi fowlers  MAP (Calculated): 90  SpO2: 95 %  O2 Device: Nasal cannula  Bed Mobility Training  Bed Mobility Training: No    Gait Training: No (Pt reports fatigue, denies further mobility)     ADL  Grooming: Supervision;Setup  Grooming Skilled Clinical Factors: hand hygiene, wash face, brush teeth in bed  OT Exercises  Exercise Treatment: BUE seated in bed x15-20 finger/wrist/elbow flexion extension, shoulder press, chest press, shoulder press     Safety Devices  Type of Devices: All fall risk precautions in place;Call light within reach;Gait belt;Nurse notified; Patient at risk for falls; Left in bed;Bed alarm in place     Patient Education  Education Given To: Patient; Family  Education Provided: Role of Therapy; ADL Adaptive Strategies; Plan of Care;Energy Conservation;Equipment  Education Provided Comments: disease specific: benefit of shower chair, role of OT in acute care vs PT, general safety, use of call light, prevention of complications, take rest breaks PRN, d/c recommendations and reasoning. Education Method: Verbal  Barriers to Learning: None  Education Outcome: Verbalized understanding;Demonstrated understanding    AM-PAC Daily Activity Inpatient   AM-PAC Inpatient Daily Activity Raw Score: 23  AM-PAC Inpatient ADL T-Scale Score : 51.12  ADL Inpatient CMS 0-100% Score: 15.86  ADL Inpatient CMS G-Code Modifier : CI    Goals  Short Term Goals  Time Frame for Short term goals: 1 week (8/2) unless stated otherwise. Short Term Goal 1: Pt will total body dress with mod (I).   Short Term Goal 2: Pt will perform BUE ther ex x20 to increase endurance for functional activities (8/9)  Short Term Goal 3: Pt will perform at least 7 minutes of standing functional activity with mod (I) - 7/27 progressing, pt able to tolerate ~5min standing while completing grooming  Patient Goals   Patient goals : Ursula Ohms go home as independent as possible\"       Therapy Time   Individual Concurrent Group Co-treatment   Time In 0920         Time Out 0943         Minutes 23         Timed Code Treatment Minutes: [address], OT  If pt is unable to be seen after this session, please let this note serve as discharge summary. Please see case management note for discharge disposition. Thank you.

## 2022-07-29 NOTE — PROGRESS NOTES
Physical Therapy  Facility/Department: [facility] C3 TELE/MED SURG/ONC  Physical Therapy Initial Assessment    Name: Jyoti Daigle  : 1955  MRN: [id number]  Date of Service: 2022    Discharge Recommendations:  Home with assist PRN, Home with Home health PT   PT Equipment Recommendations  Equipment Needed: Yes  Mobility Devices: Mardell Teena: Rolling      Patient Diagnosis(es): The primary encounter diagnosis was Acute pulmonary embolism, unspecified pulmonary embolism type, unspecified whether acute cor pulmonale present ([facility] [address] A diagnosis of Elevated troponin was also pertinent to this visit. Past Medical History:  has a past medical history of Anxiety, Ascites, DVT (deep venous thrombosis) ([facility] [address] Hypertension, Ovarian ca ([facility] [address] and Pulmonary embolism ([facility] [address] Past Surgical History:  has a past surgical history that includes Colonoscopy (14) and CT BIOPSY ABDOMEN RETROPERITONEUM (2022). Assessment Pt progressing towards goals. Pt req grossly SBA for OOB mobility and Sup for bed mobility to EOB. Pt will cont to benefit from skilled PT services to improve function towards goals. Pt will cont to benefit from home w/ PRN assist and HHPT at d/c w/ use of RW. Specific Instructions for Next Treatment: Progress ther ex and mobility as tolerated  Activity Tolerance  Activity Tolerance: Patient tolerated treatment well;Patient limited by endurance     Plan   Plan  Plan: 3-5 times per week  Specific Instructions for Next Treatment: Progress ther ex and mobility as tolerated  Current Treatment Recommendations: Strengthening, Balance training, Functional mobility training, Transfer training, Endurance training, Gait training, Stair training, Home exercise program, Safety education & training, Patient/Caregiver education & training, Therapeutic activities  Safety Devices  Type of Devices:  All fall risk precautions in place, Call light within reach, Gait belt, Nurse notified, Patient at risk for falls, Left in chair, Chair alarm in place     Restrictions  Restrictions/Precautions  Restrictions/Precautions: General Precautions  Position Activity Restriction  Other position/activity restrictions: Medium fall risk per nursing assessment, up with assistance, IV lines, PICC LUE, 3L O2, telemetry     Subjective   Pain: C/o abdominal pain and nausea with mobility; does not rate pain.          Social/Functional History  Social/Functional History  Lives With: Alone  Type of Home: House  Home Layout: One level, Laundry in basement (1-story home with basement)  Home Access: Stairs to enter with rails  Entrance Stairs - Number of Steps: 3 SHON through side entrance (with L handrail ascending)  Entrance Stairs - Rails: Left  Bathroom Shower/Tub: Tub/Shower unit, Walk-in shower (tub/shower on main level (pt typically uses this bathroom), walk-in shower in basement)  Bathroom Toilet: Standard  Home Equipment:  (no home DME, no home O2 at baseline)  Has the patient had two or more falls in the past year or any fall with injury in the past year?: No  ADL Assistance: Independent  Homemaking Assistance: Independent  Ambulation Assistance: Independent (without AD)  Transfer Assistance: Independent  Active : Yes  Occupation: Retired  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[place] [address]: Going to the pool, swimming, working in the yard  Vision/Hearing       Cognition   Orientation  Overall Orientation Status: Within Normal Limits  Orientation Level: Oriented X4  Cognition  Overall Cognitive Status: WFL     Objective   Heart Rate: 100  Heart Rate Source: Brachial;Right  BP: 120/75  BP Location: Right upper arm  BP Method: Automatic  MAP (Calculated): 90  Resp: 16  SpO2: 95 %  O2 Device: Nasal cannula                          Bed Mobility Training  Bed Mobility Training: Yes  Overall Level of Assistance: Supervision  Supine to Sit: Supervision  Scooting: Supervision  Balance  Sitting: Intact  Standing: Intact  Transfer you.

## 2022-07-29 NOTE — PROGRESS NOTES
Shift assessment completed. VSS. Pt had no complaints at this time. Bed in lowest position and call light within reach. Will continue to monitor.

## 2022-07-29 NOTE — ONCOLOGY
ONCOLOGY HEMATOLOGY CARE PROGRESS NOTE      SUBJECTIVE:     Afebrile and on 3 LPM by NC. Hopes to go home today. ROS:   The remaining 10 point review of symptoms is unremarkable. OBJECTIVE        Physical    VITALS:  /85   Pulse (!) 102   Temp 98.6 °F (37 °C) (Oral)   Resp 16   Ht 5' 4\" (1.626 m)   Wt 190 lb 14.4 oz (86.6 kg)   LMP  (LMP Unknown)   SpO2 91%   BMI 32.77 kg/m²   TEMPERATURE:  Current - Temp: 98.6 °F (37 °C); Max - Temp  Av.3 °F (36.8 °C)  Min: 97.8 °F (36.6 °C)  Max: 98.6 °F (37 °C)  PULSE OXIMETRY RANGE: SpO2  Av.4 %  Min: 91 %  Max: 96 %  24HR INTAKE/OUTPUT:    Intake/Output Summary (Last 24 hours) at 2022 0256  Last data filed at 2022 9278  Gross per 24 hour   Intake 360 ml   Output --   Net 360 ml         CONSTITUTIONAL:  awake, alert, cooperative, no apparent distress, HEENT oral pharynx , no scleral icterus  HEMATOLOGIC/LYMPHATICS:  no cervical lymphadenopathy, no supraclavicular lymphadenopathy, no axillary lymphadenopathy and no inguinal lymphadenopathy  LUNGS:  No increased work of breathing, good air exchange, clear to auscultation bilaterally, no crackles or wheezing  CARDIOVASCULAR:  , regular rate and rhythm, normal S1 and S2, no S3 or S4, and no murmur noted  ABDOMEN:  No scars, normal bowel sounds, soft, non-distended, non-tender, no masses palpated, no hepatosplenomegally. The abdomen is distended and unchanged. MUSCULOSKELETAL:  There is no redness, warmth, or swelling of the joints. EXTREMETIES: No clubbing cynosis or edema  NEUROLOGIC:  Awake, alert, oriented to name, place and time. Cranial nerves II-XII are grossly intact. Motor is 5 out of 5 bilaterally. SKIN:  no bruising or bleeding      Data      No results for input(s): WBC, HGB, HCT, PLT, MCV in the last 72 hours.        Recent Labs     22  0610 22  0525 22  0511    137 130*   K 5.2* 4.3 4.6    102 97*   CO2 24 26 24   BUN 12 15 16   CREATININE <0.5* <0.5* <0.5*       No results for input(s): AST, ALT, ALB, BILIDIR, BILITOT, ALKPHOS in the last 72 hours. Magnesium:    Lab Results   Component Value Date/Time    MG 2.10 07/07/2022 09:28 AM         Problem List  Patient Active Problem List   Diagnosis    HTN (hypertension)    HLD (hyperlipidemia)    Low back pain    Benign neoplasm of colon    Swelling, mass, or lump in head and neck    Chronic sinusitis    BERNIE (generalized anxiety disorder)    Major depressive disorder with single episode, in full remission ([facility] [address]    Generalized abdominal pain    Adnexal mass    Peritoneal carcinomatosis (HCC)    Malignant ascites    Anorexia    Reactive depression    Acute pulmonary embolism (HCC)    Acute deep vein thrombosis (DVT) of proximal vein of both lower extremities (HCC)    Hyponatremia    Normocytic normochromic anemia    Ovarian cancer ([facility] [address]       ASSESSMENT AND PLAN  1.) Probable ovarian cancer  - CA-125 07033 U/mL, 7/20/2022.  - CT abdomen/pelvis, 7/19/2022, showed abdominoperitoneal carcinomatosis with irregular/nodular ovaries bilaterally. Moderate ascites was present. - CT-guided mesenteric biopsy, 7/22/2022. 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 - US abdomen, 7/25/2022, showed too little ascites for paracentesis. - Left brachial vein PICC placed, 7/25/2022. - Carbo/Taxol #1 given on 7/26/2022. Due again on 8/16/2022.     2.) PE  - CTPA, 7/19/2022, showed bilateral PEs left greater than right.  - Duplex ultrasound of the BLE, 7/19/2022, showed SVT of right greater saphenous vein extending to 1.15 cm from SFJ. DVT of left distal femoral, popliteal, gastrocnemius, posterior tibial, peroneal veins.  - IVC filter placed, 7/20/2022, with Dr. Jesse Lane.  - Treating with Eliquis. 3.) HTN    4.) Loose stools  - Hold Senokot-S.      ONCOLOGIC DISPOSITION:  No oncologic barrier to discharge.     [employer], MD  May be reached through [facility]

## 2022-07-29 NOTE — PROGRESS NOTES
O2 saturation at REST on ROOM AIR = _____88_%     If saturation is 89% or above please proceed with steps 2 and 3.      O2 saturation with AMBULATION of _____ feet on ROOM AIR = _____%   O2 saturation with AMBULATION on current liter flow = ______%     DCP notified: ______     Signature: _________________________ Date: __________   Time: ______

## 2022-07-29 NOTE — DISCHARGE INSTR - COC
Continuity of Care Form    Patient Name: Emerald Parra   :  1955  MRN:  [id number]    Admit date:  2022  Discharge date:  22    Code Status Order: Full Code   Advance Directives:     Admitting Physician:  Hunter Garcia MD  PCP: Noah Carpenter MD    Discharging Nurse: [facility] Unit/Room#: 3704/0406-90  Discharging Unit Phone Number: [phone]    Emergency Contact:   Extended Emergency Contact Information  Primary Emergency Contact: Dahlia Barclay  [place] Phone: [phone]  Mobile Phone: [phone]  Relation: Brother/Sister  Secondary Emergency Contact: Khang Cain [address] Phone: [phone]  Mobile Phone: [phone]  Relation: Brother/Sister    Past Surgical History:  Past Surgical History:   Procedure Laterality Date    COLONOSCOPY  14    colon polyp removed, diverticulosis    CT BIOPSY ABDOMEN RETROPERITONEUM  2022    CT BIOPSY ABDOMEN RETROPERITONEUM 2022 Elizabeth Griggs MD [id number] [facility] CT SCAN       Immunization History:   Immunization History   Administered Date(s) Administered    Influenza Virus Vaccine 10/20/2020    Influenza, Intradermal, Quadrivalent, Preservative Free 2017    Influenza, Quadv, IM, PF (6 mo and older Fluzone, Flulaval, Fluarix, and 3 yrs and older Afluria) 2019    Influenza, Ely Pleva, Recombinant, IM PF (Flublok 18 yrs and older) 2018    Pneumococcal Conjugate 13-valent (Eda Erick) 2020    Tdap (Boostrix, Adacel) 2013    Zoster Live (Zostavax) 2016       Active Problems:  Patient Active Problem List   Diagnosis Code    HTN (hypertension) I10    HLD (hyperlipidemia) E78.5    Low back pain M54.50    Benign neoplasm of colon D12.6    Swelling, mass, or lump in head and neck R22.0, R22.1    Chronic sinusitis J32.9    BERNIE (generalized anxiety disorder) F41.1    Major depressive disorder with single episode, in full remission ([facility] [address] F32.5    Generalized abdominal pain R10.84 Adnexal mass N94.89    Peritoneal carcinomatosis (HCC) C78.6    Malignant ascites R18.0    Anorexia R63.0    Reactive depression F32.9    Acute pulmonary embolism (HCC) I26.99    Acute deep vein thrombosis (DVT) of proximal vein of both lower extremities (HCC) I82.4Y3    Hyponatremia E87.1    Normocytic normochromic anemia D64.9    Ovarian cancer ([facility] [address] C56.9       Isolation/Infection:   Isolation            No Isolation          Patient Infection Status       None to display            Nurse Assessment:  Last Vital Signs: /75   Pulse 100   Temp 98.1 °F (36.7 °C) (Oral)   Resp 16   Ht 5' 4\" (1.626 m)   Wt 190 lb 14.4 oz (86.6 kg)   LMP  (LMP Unknown)   SpO2 (!) 88%   BMI 32.77 kg/m²     Last documented pain score (0-10 scale): Pain Level: 7  Last Weight:   Wt Readings from Last 1 Encounters:   07/29/22 190 lb 14.4 oz (86.6 kg)     Mental Status:  oriented and alert    IV Access:  - PICC - site  L Upper Arm, insertion date: 7/25/2022    Nursing Mobility/ADLs:  Walking   Assisted  Transfer  Independent  Bathing  Assisted  Dressing  Assisted  Toileting  Independent  Feeding  Independent  Med Admin  Independent  Med Delivery   whole    Wound Care Documentation and Therapy:        Elimination:  Continence: Bowel: Yes  Bladder: Yes  Urinary Catheter: None   Colostomy/Ileostomy/Ileal Conduit: No       Date of Last BM: 7/29/2022    Intake/Output Summary (Last 24 hours) at 7/29/2022 1231  Last data filed at 7/29/2022 0301  Gross per 24 hour   Intake 360 ml   Output --   Net 360 ml     I/O last 3 completed shifts:   In: 400 [P.O.:360; I.V.:40]  Out: 400 [Urine:400]    Safety Concerns:     Bleeding risk    Impairments/Disabilities:      None    Nutrition Therapy:  Current Nutrition Therapy:   - Oral Diet:  Low Sodium (3-4gm)    Routes of Feeding: Oral  Liquids: No Restrictions  Daily Fluid Restriction: yes - amount 1200mL  Last Modified Barium Swallow with Video (Video Swallowing Test): not done    Treatments with Dr. Keo Cabrera MD  Wednesday Aug 17, 2022 9:30 AM  Please complete digital registration via the [facility] ([facility]) latoya. If unable to complete the visit pre-check, arrive 15 minutes early. Bring photo ID, insurance card(s), co-pay, medication bottles & completed forms. If you need to cancel/reschedule, please contact the practice at least 24 hoursprior to the appointment. [address]  [address]   [address] [facility] [address] [facility] 5[address]       Physician Certification: I certify the above information and transfer of Jeri Heart  is necessary for the continuing treatment of the diagnosis listed and that she requires Home Care for greater 30 days.      Update Admission H&P: No change in H&P    PHYSICIAN SIGNATURE:  Electronically signed by Maddy Hope MD on 7/29/22 at 12:32 PM EDT

## 2022-08-01 ENCOUNTER — TELEPHONE (OUTPATIENT)
Dept: FAMILY MEDICINE CLINIC | Age: 67
End: 2022-08-01

## 2022-08-01 ENCOUNTER — CARE COORDINATION (OUTPATIENT)
Dept: CASE MANAGEMENT | Age: 67
End: 2022-08-01

## 2022-08-01 NOTE — TELEPHONE ENCOUNTER
Jeannette [facility] Transitions Initial Follow Up Call    Outreach made within 2 business days of discharge: Yes    Patient: Lona Alvarez Patient : 1955   MRN: [id number]  Reason for Admission: There are no discharge diagnoses documented for the most recent discharge. Discharge Date: 22       Spoke with: ARMINDA for patient call     Discharge department/facility: [facility]    Non-face-to-face services provided:  Obtained and reviewed discharge summary and/or continuity of care documents    TCM Interactive Patient Contact:  Was patient able to fill all prescriptions: Yes  Was patient instructed to bring all medications to the follow-up visit: Yes  Is patient taking all medications as directed in the discharge summary?  Yes  Does patient understand their discharge instructions: Yes  Does patient have questions or concerns that need addressed prior to 7-14 day follow up office visit: no    Scheduled appointment with PCP within 7-14 days    Follow Up  Future Appointments   Date Time Provider Primo Esteves   2022  9:30 AM Marylu Mcnally MD [facility] - DYD   3/30/2023  9:00 AM Marylu Mcnally MD [address] Margarete Skates, RN

## 2022-08-01 NOTE — CARE COORDINATION
Jeannette [facility] Transitions Initial Follow Up Call    Call within 2 business days of discharge: Yes    Patient: Troy Gonzalez Patient : 1955   MRN: [id number]  Reason for Admission: PE   Discharge Date: 22 RARS: Readmission Risk Score: 12.4      Last Discharge [facility]       Date Complaint Diagnosis Description Type Department Provider    22 Shortness of Breath Acute pulmonary embolism, unspecified pulmonary embolism type, unspecified whether acute cor pulmonale present ([facility] [address] . .. ED to Hosp-Admission (Discharged) (ADMITTED) Yasmine Aguilar MD; Clayton Hurd . .. Attempted to reach patient via phone for initial post hospital transition call. VM left stating purpose of call along with my contact information requesting a return call. Facility: [facility]     Non-face-to-face services provided:  Communication with home health agencies or other community services the patient is currently using-[facility] spoke with Khang Taveras who reported waiting on faxed order for home care. LVM for Ciara with intake.      Care Transitions 24 Hour Call    Care Transitions Interventions         Follow Up  Future Appointments   Date Time Provider Primo Esteves   2022  9:30 AM MD AMIE Medina  Kimberly LUNA   3/30/2023  9:00 AM MD AMIE Medina  Kimberly - JEREMY CHE, RN, [facility]  Care Transition Nurse  [phone] mobile

## 2022-08-02 ENCOUNTER — CARE COORDINATION (OUTPATIENT)
Dept: CASE MANAGEMENT | Age: 67
End: 2022-08-02

## 2022-08-02 DIAGNOSIS — I26.99 OTHER ACUTE PULMONARY EMBOLISM WITHOUT ACUTE COR PULMONALE (HCC): Primary | ICD-10-CM

## 2022-08-02 PROCEDURE — 1111F DSCHRG MED/CURRENT MED MERGE: CPT | Performed by: FAMILY MEDICINE

## 2022-08-02 NOTE — CARE COORDINATION
Jeannette [facility] Transitions Initial Follow Up Call    Call within 2 business days of discharge: Yes    Patient: Emerald Parra Patient : 1955   MRN: [id number]  Reason for Admission: PE  Discharge Date: 22 RARS: Readmission Risk Score: 12.4      Last Discharge [facility]       Date Complaint Diagnosis Description Type Department Provider    22 Shortness of Breath Acute pulmonary embolism, unspecified pulmonary embolism type, unspecified whether acute cor pulmonale present ([facility] [address] . .. ED to Hosp-Admission (Discharged) (ADMITTED) Reinier Aguilar MD; Rebecca Ashley . .. Spoke with: [address] [facility]: [facility]     Non-face-to-face services provided:  Obtained and reviewed discharge summary and/or continuity of care documents  Communication with home health agencies or other community services the patient is currently using-.  Education of patient/family/caregiver/guardian to support self-management-.    Transitions of Care Initial Call    Was this an external facility discharge? No Discharge Facility: n.a    Challenges to be reviewed by the provider   Additional needs identified to be addressed with provider: No  none             Method of communication with provider : none    Advance Care Planning:   Does patient have an Advance Directive: reviewed and current. Care Transition Nurse contacted the patient by telephone to perform post hospital discharge assessment. Verified name and  with patient as identifiers. Provided introduction to self, and explanation of the CTN role. CTN reviewed discharge instructions, medical action plan and red flags with patient who verbalized understanding. Patient given an opportunity to ask questions and does not have any further questions or concerns at this time. Were discharge instructions available to patient? Yes.  Reviewed appropriate site of care based on symptoms and resources available to patient including: PCP  Specialist  Home health  When to call 911. The patient agrees to contact the PCP office for questions related to their healthcare. Medication reconciliation was performed with patient, who verbalizes understanding of administration of home medications. Advised obtaining a 90-day supply of all daily and as-needed medications. Was patient discharged with a pulse oximeter? no    CTN provided contact information. Plan for follow-up call in 5-7 days based on severity of symptoms and risk factors. Plan for next call: symptom management-.  self management-.  follow up appointment-.  medication management-. CTN spoke with patient who reported she is doing alright. Patient reported she is glad to be home but tired. CTN reviewed all medication with patient who reported she is taking all as prescribed. CTN discussed s/sx to monitor and report regarding anticoagulation. Patient denied any sob or cp at this time and is using her oxygen. Patient reported she has apt with oncologist on 8/8. Patient reported HC will be out today. Denies any acute needs at present time. Agreeable to f/u calls. Educated on the use of urgent care or physicians 24 hr access line if assistance is needed after hours & that they can always contact their home care provider to request a nurse visit even if it isn't their regularly scheduled day for their nurse to visit.              Care Transitions 24 Hour Call    Do you have a copy of your discharge instructions?: Yes  Do you have all of your prescriptions and are they filled?: Yes  Have you been contacted by a [address]?: No  Have you scheduled your follow up appointment?: Yes  How are you going to get to your appointment?: Car - family or friend to transport  Do you have support at home?: Roommate/Housemate  Do you feel like you have everything you need to keep you well at home?: Yes  Care Transitions Interventions         Follow Up  Future Appointments   Date Time Provider Primo Esteves   8/17/2022  9:30 AM MD AMIE Corral   3/30/2023  9:00 AM MD AMIE Corral  Kimberly Child BSN, RN, [facility]  Care Transition Nurse  [phone] mobile

## 2022-08-03 ENCOUNTER — TELEPHONE (OUTPATIENT)
Dept: SURGERY | Age: 67
End: 2022-08-03

## 2022-08-03 NOTE — TELEPHONE ENCOUNTER
Called and spoke w/ pt - Scheduled for a Surgical Port Placement [facility]) w/ Dr Armenta Smoker on 8/10/22 @ 1:45pm arrival 11:45am [facility] - NPO after midnight dasia b/f surgery - Pt will need  day of surgery - [facility] completed pre-op physical (see media) - Pt will need to stop Eliquis prior to surgery (Dr Yasmine Wagner [facility] to advise pt of this) - Pt understood and agreed to above noted - Surgery instructions emailed to pt: [email or web address] (see media)

## 2022-08-05 ENCOUNTER — HOSPITAL ENCOUNTER (INPATIENT)
Age: 67
LOS: 5 days | DRG: 754 | End: 2022-08-10
Attending: EMERGENCY MEDICINE | Admitting: INTERNAL MEDICINE
Payer: MEDICARE

## 2022-08-05 ENCOUNTER — APPOINTMENT (OUTPATIENT)
Dept: GENERAL RADIOLOGY | Age: 67
DRG: 754 | End: 2022-08-05
Payer: MEDICARE

## 2022-08-05 ENCOUNTER — APPOINTMENT (OUTPATIENT)
Dept: CT IMAGING | Age: 67
DRG: 754 | End: 2022-08-05
Payer: MEDICARE

## 2022-08-05 DIAGNOSIS — R10.9 ABDOMINAL PAIN WITH VOMITING: ICD-10-CM

## 2022-08-05 DIAGNOSIS — R18.0 MALIGNANT ASCITES: ICD-10-CM

## 2022-08-05 DIAGNOSIS — K56.7 ILEUS (HCC): ICD-10-CM

## 2022-08-05 DIAGNOSIS — R11.10 ABDOMINAL PAIN WITH VOMITING: ICD-10-CM

## 2022-08-05 DIAGNOSIS — N39.0 URINARY TRACT INFECTION WITHOUT HEMATURIA, SITE UNSPECIFIED: Primary | ICD-10-CM

## 2022-08-05 DIAGNOSIS — J90 PLEURAL EFFUSION ON LEFT: ICD-10-CM

## 2022-08-05 PROBLEM — R11.2 INTRACTABLE NAUSEA AND VOMITING: Status: ACTIVE | Noted: 2022-08-05

## 2022-08-05 LAB
A/G RATIO: 1 (ref 1.1–2.2)
ALBUMIN SERPL-MCNC: 3 G/DL (ref 3.4–5)
ALP BLD-CCNC: 65 U/L (ref 40–129)
ALT SERPL-CCNC: 22 U/L (ref 10–40)
AMORPHOUS: ABNORMAL /HPF
ANION GAP SERPL CALCULATED.3IONS-SCNC: 10 MMOL/L (ref 3–16)
ANISOCYTOSIS: ABNORMAL
AST SERPL-CCNC: 32 U/L (ref 15–37)
BACTERIA: ABNORMAL /HPF
BANDED NEUTROPHILS RELATIVE PERCENT: 42 % (ref 0–7)
BASOPHILS ABSOLUTE: 0 K/UL (ref 0–0.2)
BASOPHILS RELATIVE PERCENT: 0 %
BILIRUB SERPL-MCNC: 0.6 MG/DL (ref 0–1)
BILIRUBIN URINE: ABNORMAL
BLOOD, URINE: NEGATIVE
BUN BLDV-MCNC: 15 MG/DL (ref 7–20)
CALCIUM SERPL-MCNC: 8.4 MG/DL (ref 8.3–10.6)
CHLORIDE BLD-SCNC: 97 MMOL/L (ref 99–110)
CLARITY: ABNORMAL
CO2: 26 MMOL/L (ref 21–32)
COLOR: ABNORMAL
CREAT SERPL-MCNC: <0.5 MG/DL (ref 0.6–1.2)
EOSINOPHILS ABSOLUTE: 0 K/UL (ref 0–0.6)
EOSINOPHILS RELATIVE PERCENT: 0 %
GFR AFRICAN AMERICAN: >60
GFR NON-AFRICAN AMERICAN: >60
GLUCOSE BLD-MCNC: 126 MG/DL (ref 70–99)
GLUCOSE URINE: NEGATIVE MG/DL
HCT VFR BLD CALC: 29.8 % (ref 36–48)
HEMATOLOGY PATH CONSULT: NO
HEMOGLOBIN: 9.9 G/DL (ref 12–16)
HYALINE CASTS: ABNORMAL /LPF (ref 0–2)
KETONES, URINE: NEGATIVE MG/DL
LACTIC ACID: 1.2 MMOL/L (ref 0.4–2)
LEUKOCYTE ESTERASE, URINE: NEGATIVE
LIPASE: 55 U/L (ref 13–60)
LYMPHOCYTES ABSOLUTE: 0.8 K/UL (ref 1–5.1)
LYMPHOCYTES RELATIVE PERCENT: 20 %
MACROCYTES: ABNORMAL
MCH RBC QN AUTO: 28.1 PG (ref 26–34)
MCHC RBC AUTO-ENTMCNC: 33.3 G/DL (ref 31–36)
MCV RBC AUTO: 84.3 FL (ref 80–100)
METAMYELOCYTES RELATIVE PERCENT: 2 %
MICROSCOPIC EXAMINATION: YES
MONOCYTES ABSOLUTE: 1.2 K/UL (ref 0–1.3)
MONOCYTES RELATIVE PERCENT: 32 %
MUCUS: ABNORMAL /LPF
NEUTROPHILS ABSOLUTE: 1.9 K/UL (ref 1.7–7.7)
NEUTROPHILS RELATIVE PERCENT: 4 %
NITRITE, URINE: POSITIVE
PDW BLD-RTO: 13.7 % (ref 12.4–15.4)
PH UA: 6 (ref 5–8)
PLATELET # BLD: 140 K/UL (ref 135–450)
PLATELET SLIDE REVIEW: ADEQUATE
PMV BLD AUTO: 8.1 FL (ref 5–10.5)
POIKILOCYTES: ABNORMAL
POLYCHROMASIA: ABNORMAL
POTASSIUM SERPL-SCNC: 3.1 MMOL/L (ref 3.5–5.1)
PROTEIN UA: 100 MG/DL
RBC # BLD: 3.53 M/UL (ref 4–5.2)
RBC UA: ABNORMAL /HPF (ref 0–4)
SLIDE REVIEW: ABNORMAL
SODIUM BLD-SCNC: 133 MMOL/L (ref 136–145)
SPECIFIC GRAVITY UA: 1.02 (ref 1–1.03)
TOTAL PROTEIN: 6 G/DL (ref 6.4–8.2)
URINE REFLEX TO CULTURE: ABNORMAL
URINE TYPE: ABNORMAL
UROBILINOGEN, URINE: 1 E.U./DL
WBC # BLD: 3.9 K/UL (ref 4–11)
WBC UA: ABNORMAL /HPF (ref 0–5)

## 2022-08-05 PROCEDURE — 99285 EMERGENCY DEPT VISIT HI MDM: CPT

## 2022-08-05 PROCEDURE — 74177 CT ABD & PELVIS W/CONTRAST: CPT

## 2022-08-05 PROCEDURE — 87040 BLOOD CULTURE FOR BACTERIA: CPT

## 2022-08-05 PROCEDURE — 1200000000 HC SEMI PRIVATE

## 2022-08-05 PROCEDURE — 83690 ASSAY OF LIPASE: CPT

## 2022-08-05 PROCEDURE — 85025 COMPLETE CBC W/AUTO DIFF WBC: CPT

## 2022-08-05 PROCEDURE — 6360000004 HC RX CONTRAST MEDICATION: Performed by: PHYSICIAN ASSISTANT

## 2022-08-05 PROCEDURE — 81001 URINALYSIS AUTO W/SCOPE: CPT

## 2022-08-05 PROCEDURE — 83605 ASSAY OF LACTIC ACID: CPT

## 2022-08-05 PROCEDURE — 2580000003 HC RX 258: Performed by: PHYSICIAN ASSISTANT

## 2022-08-05 PROCEDURE — 96361 HYDRATE IV INFUSION ADD-ON: CPT

## 2022-08-05 PROCEDURE — 96375 TX/PRO/DX INJ NEW DRUG ADDON: CPT

## 2022-08-05 PROCEDURE — 96367 TX/PROPH/DG ADDL SEQ IV INF: CPT

## 2022-08-05 PROCEDURE — 71045 X-RAY EXAM CHEST 1 VIEW: CPT

## 2022-08-05 PROCEDURE — 6360000002 HC RX W HCPCS: Performed by: PHYSICIAN ASSISTANT

## 2022-08-05 PROCEDURE — 6360000002 HC RX W HCPCS: Performed by: EMERGENCY MEDICINE

## 2022-08-05 PROCEDURE — 2700000000 HC OXYGEN THERAPY PER DAY

## 2022-08-05 PROCEDURE — 96365 THER/PROPH/DIAG IV INF INIT: CPT

## 2022-08-05 PROCEDURE — 93005 ELECTROCARDIOGRAM TRACING: CPT | Performed by: PHYSICIAN ASSISTANT

## 2022-08-05 PROCEDURE — 94761 N-INVAS EAR/PLS OXIMETRY MLT: CPT

## 2022-08-05 PROCEDURE — 80053 COMPREHEN METABOLIC PANEL: CPT

## 2022-08-05 RX ORDER — MORPHINE SULFATE 4 MG/ML
4 INJECTION, SOLUTION INTRAMUSCULAR; INTRAVENOUS ONCE
Status: COMPLETED | OUTPATIENT
Start: 2022-08-05 | End: 2022-08-05

## 2022-08-05 RX ORDER — MORPHINE SULFATE 4 MG/ML
4 INJECTION, SOLUTION INTRAMUSCULAR; INTRAVENOUS
Status: COMPLETED | OUTPATIENT
Start: 2022-08-05 | End: 2022-08-05

## 2022-08-05 RX ORDER — POTASSIUM CHLORIDE 7.45 MG/ML
10 INJECTION INTRAVENOUS
Status: COMPLETED | OUTPATIENT
Start: 2022-08-05 | End: 2022-08-06

## 2022-08-05 RX ORDER — ONDANSETRON 2 MG/ML
4 INJECTION INTRAMUSCULAR; INTRAVENOUS ONCE
Status: COMPLETED | OUTPATIENT
Start: 2022-08-05 | End: 2022-08-05

## 2022-08-05 RX ORDER — SODIUM CHLORIDE, SODIUM LACTATE, POTASSIUM CHLORIDE, AND CALCIUM CHLORIDE .6; .31; .03; .02 G/100ML; G/100ML; G/100ML; G/100ML
1000 INJECTION, SOLUTION INTRAVENOUS ONCE
Status: COMPLETED | OUTPATIENT
Start: 2022-08-05 | End: 2022-08-05

## 2022-08-05 RX ADMIN — MORPHINE SULFATE 4 MG: 4 INJECTION, SOLUTION INTRAMUSCULAR; INTRAVENOUS at 22:44

## 2022-08-05 RX ADMIN — IOPAMIDOL 75 ML: 755 INJECTION, SOLUTION INTRAVENOUS at 20:26

## 2022-08-05 RX ADMIN — CEFTRIAXONE SODIUM 1000 MG: 1 INJECTION, POWDER, FOR SOLUTION INTRAMUSCULAR; INTRAVENOUS at 21:03

## 2022-08-05 RX ADMIN — ONDANSETRON 4 MG: 2 INJECTION INTRAMUSCULAR; INTRAVENOUS at 19:36

## 2022-08-05 RX ADMIN — MORPHINE SULFATE 4 MG: 4 INJECTION, SOLUTION INTRAMUSCULAR; INTRAVENOUS at 19:36

## 2022-08-05 RX ADMIN — POTASSIUM CHLORIDE 10 MEQ: 7.46 INJECTION, SOLUTION INTRAVENOUS at 22:31

## 2022-08-05 RX ADMIN — SODIUM CHLORIDE, POTASSIUM CHLORIDE, SODIUM LACTATE AND CALCIUM CHLORIDE 1000 ML: 600; 310; 30; 20 INJECTION, SOLUTION INTRAVENOUS at 19:35

## 2022-08-05 RX ADMIN — POTASSIUM CHLORIDE 10 MEQ: 7.46 INJECTION, SOLUTION INTRAVENOUS at 23:30

## 2022-08-05 RX ADMIN — POTASSIUM CHLORIDE 10 MEQ: 7.46 INJECTION, SOLUTION INTRAVENOUS at 21:03

## 2022-08-05 ASSESSMENT — PAIN SCALES - GENERAL
PAINLEVEL_OUTOF10: 6
PAINLEVEL_OUTOF10: 10
PAINLEVEL_OUTOF10: 3
PAINLEVEL_OUTOF10: 4

## 2022-08-05 ASSESSMENT — PAIN DESCRIPTION - LOCATION: LOCATION: ABDOMEN

## 2022-08-05 ASSESSMENT — ENCOUNTER SYMPTOMS
VOMITING: 1
ABDOMINAL PAIN: 1
NAUSEA: 1
ABDOMINAL DISTENTION: 1

## 2022-08-05 ASSESSMENT — PAIN DESCRIPTION - ORIENTATION: ORIENTATION: LOWER;INNER

## 2022-08-05 ASSESSMENT — PAIN - FUNCTIONAL ASSESSMENT: PAIN_FUNCTIONAL_ASSESSMENT: 0-10

## 2022-08-05 NOTE — ED PROVIDER NOTES
[facility] C3 TELE/MED SURG/ONC  EMERGENCY DEPARTMENT ENCOUNTER        Pt Name: Ladarius Gilbert  MRN: [id number]  [place] 1955  Date of evaluation: 8/5/2022  Provider: Nurys Lamar PA-C  PCP: Leonila Ruelas MD  Note Started: 7:09 PM EDT        I have seen and evaluated this patient with my supervising physician Makayla Rajan MD.    [address]       Chief Complaint   Patient presents with    Fatigue    Anorexia     For the last week; reports loss of appetite/ N/V; patient just diagnosed 3 weeks ago with ovarian cancer; patient reports the pain in stomach is the same it has been since learning of diagnosis; patient reports pain 10/10    Abdominal Pain     Patient newly diagnosed with ovarian cancer       HISTORY OF PRESENT ILLNESS   (Location, Timing/Onset, Context/Setting, Quality, Duration, Modifying Factors, Severity, Associated Signs and Symptoms)  Note limiting factors. Chief Complaint: Abdominal pain, vomiting, general fatigue    Ladarius Gilbert is a 79 y.o. female with past medical history of recent diagnosis of ovarian cancer with peritoneal carcinomatosis who presents complaining of abdominal pain, vomiting, general fatigue. Patient was discharged from the hospital 7/29 after being admitted for bilateral DVT, bilateral PE, hyponatremia, newly diagnosed ovarian cancer with peritoneal metastasis static disease. She states since discharge, abdominal pain has been worsening. She has worsening vomiting and distention, denies prior paracentesis. Last bowel movement was this AM.  Denies fever, hematemesis, melena, hematochezia, syncope, chest pain, shortness of breath. Patient reports last chemotherapy was while she was inpatient. Nursing Notes were all reviewed and agreed with or any disagreements were addressed in the HPI. REVIEW OF SYSTEMS    (2-9 systems for level 4, 10 or more for level 5)     Review of Systems   Constitutional:  Positive for fatigue.  Negative for chills and fever. Gastrointestinal:  Positive for abdominal distention, abdominal pain, nausea and vomiting. All other systems reviewed and are negative. Positives and Pertinent negatives as per HPI. Except as noted above in the ROS, all other systems were reviewed and negative. PAST MEDICAL HISTORY     Past Medical History:   Diagnosis Date    Anxiety     Ascites     Cerebral artery occlusion with cerebral infarction [facility])     DVT (deep venous thrombosis) (HCC)     Hx of blood clots     Hyperlipidemia     Hypertension     Ovarian ca ([facility] [address] 07/10/2022    mets    Pulmonary embolism ([facility] [address] 07/19/2022    bilateral         SURGICAL HISTORY     Past Surgical History:   Procedure Laterality Date    COLONOSCOPY  09/08/2014    colon polyp removed, diverticulosis    CT BIOPSY ABDOMEN RETROPERITONEUM  07/22/2022    CT BIOPSY ABDOMEN RETROPERITONEUM 7/22/2022 MD Nikole Clay CT SCAN    IVC FILTER INSERTION  07/2022         CURRENTMEDICATIONS       Current Discharge Medication List        CONTINUE these medications which have NOT CHANGED    Details   megestrol (MEGACE) 40 MG/ML suspension Take 400 mg by mouth in the morning. morphine (MSIR) 15 MG tablet Take 15 mg by mouth every 4 hours as needed for Pain.      prochlorperazine (COMPAZINE) 10 MG tablet Take 10 mg by mouth every 6 hours as needed      apixaban (ELIQUIS) 5 MG TABS tablet Take 1 tablet by mouth in the morning and 1 tablet before bedtime. Qty: 60 tablet, Refills: 1      sodium chloride 1 g tablet Take 1 tablet by mouth in the morning and 1 tablet in the evening. Take with meals. Qty: 90 tablet, Refills: 3      simethicone (MYLICON) 80 MG chewable tablet Take 1 tablet by mouth every 6 hours as needed for Flatulence  Qty: 180 tablet, Refills: 3      midodrine (PROAMATINE) 10 MG tablet Take 1 tablet by mouth in the morning and 1 tablet at noon and 1 tablet in the evening. Take with meals. Hold if Systolic blood pressure >060.   Qty: 90 tablet, Refills: 3      sertraline (ZOLOFT) 50 MG tablet Take 0.5 tablets by mouth in the morning. TAKE ONE TABLET BY MOUTH DAILY. Qty: 30 tablet, Refills: 2    Associated Diagnoses: Anxiety      omeprazole (PRILOSEC) 20 MG delayed release capsule Take 1 capsule by mouth every morning (before breakfast)  Qty: 30 capsule, Refills: 5    Associated Diagnoses: Anorexia      sennosides-docusate sodium (SENOKOT-S) 8.6-50 MG tablet Take 2 tablets by mouth daily  Qty: 60 tablet, Refills: 2    Associated Diagnoses: Generalized abdominal pain      polyethylene glycol (GLYCOLAX) 17 GM/SCOOP powder Take 17 g by mouth daily  Qty: 510 g, Refills: 0      lovastatin (MEVACOR) 40 MG tablet TAKE ONE TAB BY MOUTH ONCE NIGHTLY  Qty: 90 tablet, Refills: 1    Associated Diagnoses: Mixed hyperlipidemia      fluticasone (FLONASE) 50 MCG/ACT nasal spray SPRAY ONE SPRAY IN EACH NOSTRIL ONCE DAILY  Qty: 1 each, Refills: 2    Associated Diagnoses: Allergic rhinitis, unspecified seasonality, unspecified trigger      Multiple Vitamin (MULTIVITAMINS PO) Take by mouth      aspirin 81 MG chewable tablet Take 81 mg by mouth      Coenzyme Q10 10 MG CAPS Take 10 mg by mouth      Calcium Carb-Cholecalciferol (CALCIUM CARBONATE-VITAMIN D3 PO) Take by mouth               ALLERGIES     Patient has no known allergies. FAMILYHISTORY       Family History   Problem Relation Age of Onset    Cancer Mother         Pancreatic Cancer    Diabetes Mother     High Blood Pressure Mother     Breast Cancer Sister     Breast Cancer Maternal Aunt     Breast Cancer Maternal Cousin           SOCIAL HISTORY       Social History     Tobacco Use    Smoking status: Former     Packs/day: 0.50     Years: 20.00     Pack years: 10.00     Types: Cigarettes     Quit date: 5/3/1996     Years since quittin.2    Smokeless tobacco: Never   Vaping Use    Vaping Use: Never used   Substance Use Topics    Alcohol use:  Yes     Alcohol/week: 2.0 standard drinks     Types: 2 Cans of beer per week     Comment: Not currently drinking    Drug use: No       SCREENINGS    Krystian Coma Scale  Eye Opening: Spontaneous  Best Verbal Response: Oriented  Best Motor Response: Obeys commands  Krystian Coma Scale Score: 15        PHYSICAL EXAM    (up to 7 for level 4, 8 or more for level 5)     ED Triage Vitals [08/05/22 1901]   BP Temp Temp Source Heart Rate Resp SpO2 Height Weight   (!) 127/90 98.4 °F (36.9 °C) Oral 90 20 94 % 5' 4\" (1.626 m) 190 lb (86.2 kg)       Physical Exam  Vitals and nursing note reviewed. Constitutional:       Appearance: She is ill-appearing. She is not toxic-appearing. HENT:      Head: Normocephalic and atraumatic. Right Ear: External ear normal.      Left Ear: External ear normal.      Nose: Nose normal.      Mouth/Throat:      Mouth: Mucous membranes are dry. Pharynx: Oropharynx is clear. Eyes:      Extraocular Movements: Extraocular movements intact. Conjunctiva/sclera: Conjunctivae normal.      Pupils: Pupils are equal, round, and reactive to light. Cardiovascular:      Rate and Rhythm: Normal rate and regular rhythm. Pulses: Normal pulses. Heart sounds: Normal heart sounds. Pulmonary:      Effort: Pulmonary effort is normal. No respiratory distress. Breath sounds: Normal breath sounds. Abdominal:      General: Abdomen is flat. Bowel sounds are decreased. There is distension. Palpations: Abdomen is soft. Tenderness: There is abdominal tenderness (general). There is no guarding or rebound. Comments: + ascites   Musculoskeletal:         General: Normal range of motion. Cervical back: Normal range of motion and neck supple. Skin:     General: Skin is warm and dry. Capillary Refill: Capillary refill takes less than 2 seconds. Neurological:      General: No focal deficit present. Mental Status: She is alert and oriented to person, place, and time.    Psychiatric:         Mood and Affect: Mood normal. Behavior: Behavior normal.       DIAGNOSTIC RESULTS   LABS:    Labs Reviewed   CBC WITH AUTO DIFFERENTIAL - Abnormal; Notable for the following components:       Result Value    WBC 3.9 (*)     RBC 3.53 (*)     Hemoglobin 9.9 (*)     Hematocrit 29.8 (*)     Lymphocytes Absolute 0.8 (*)     Bands Relative 42 (*)     Metamyelocytes Relative 2 (*)     Anisocytosis Occasional (*)     Macrocytes Occasional (*)     Polychromasia Occasional (*)     Poikilocytes Occasional (*)     All other components within normal limits   COMPREHENSIVE METABOLIC PANEL - Abnormal; Notable for the following components:    Sodium 133 (*)     Potassium 3.1 (*)     Chloride 97 (*)     Glucose 126 (*)     Creatinine <0.5 (*)     Total Protein 6.0 (*)     Albumin 3.0 (*)     Albumin/Globulin Ratio 1.0 (*)     All other components within normal limits   URINALYSIS WITH REFLEX TO CULTURE - Abnormal; Notable for the following components:    Color, UA DARK YELLOW (*)     Clarity, UA CLOUDY (*)     Bilirubin Urine SMALL (*)     Protein,  (*)     Nitrite, Urine POSITIVE (*)     All other components within normal limits   MICROSCOPIC URINALYSIS - Abnormal; Notable for the following components:    Mucus, UA 2+ (*)     Bacteria, UA 2+ (*)     All other components within normal limits   CULTURE, BLOOD 1   CULTURE, BLOOD 2   LIPASE   LACTIC ACID   BASIC METABOLIC PANEL W/ REFLEX TO MG FOR LOW K   CBC WITH AUTO DIFFERENTIAL       When ordered only abnormal lab results are displayed. All other labs were within normal range or not returned as of this dictation. EKG: When ordered, EKG's are interpreted by the Emergency Department Physician in the absence of a cardiologist.  Please see their note for interpretation of EKG.     RADIOLOGY:   Non-plain film images such as CT, Ultrasound and MRI are read by the radiologist. Plain radiographic images are visualized and preliminarily interpreted by the ED Provider with the below findings:        Interpretation per the Radiologist below, if available at the time of this note:    CT ABDOMEN PELVIS W IV CONTRAST Additional Contrast? None   Final Result   Right lower lobe pulmonary emboli unchanged. Increased left lower lobe pulmonary consolidation. Moderate left effusion   unchanged. Moderate ascites unchanged. Peritoneal carcinomatosis appears somewhat   improved. Ileus. XR CHEST PORTABLE   Final Result   No acute abnormality allowing for patient rotation. No results found.         PROCEDURES   Unless otherwise noted below, none     Procedures    CRITICAL CARE TIME       CONSULTS:  IP CONSULT TO HOSPITALIST      EMERGENCY DEPARTMENT COURSE and DIFFERENTIAL DIAGNOSIS/MDM:   Vitals:    Vitals:    08/05/22 2104 08/05/22 2230 08/05/22 2324 08/06/22 0030   BP: 124/82 131/89 (!) 128/90 (!) 121/50   Pulse: (!) 104 (!) 107 (!) 110 (!) 103   Resp: 18 20 22 18   Temp:   98.3 °F (36.8 °C) 97.5 °F (36.4 °C)   TempSrc:   Oral Oral   SpO2: 98% 95% 96% 96%   Weight:       Height:           Patient was given the following medications:  Medications   potassium chloride 10 mEq/100 mL IVPB (Peripheral Line) (10 mEq IntraVENous New Bag 8/5/22 2330)   sodium chloride tablet 1 g (has no administration in time range)   sertraline (ZOLOFT) tablet 25 mg (has no administration in time range)   pantoprazole (PROTONIX) tablet 40 mg (has no administration in time range)   morphine (MSIR) tablet 15 mg (has no administration in time range)   midodrine (PROAMATINE) tablet 10 mg (has no administration in time range)   megestrol (MEGACE) 40 MG/ML suspension 400 mg (has no administration in time range)   atorvastatin (LIPITOR) tablet 10 mg (has no administration in time range)   fluticasone (FLONASE) 50 MCG/ACT nasal spray 1 spray (has no administration in time range)   aspirin chewable tablet 81 mg (has no administration in time range)   apixaban (ELIQUIS) tablet 5 mg (has no administration in time range)   cefTRIAXone (ROCEPHIN) 1,000 mg in dextrose 5 % 50 mL IVPB mini-bag (has no administration in time range)   sodium chloride flush 0.9 % injection 10 mL (has no administration in time range)   sodium chloride flush 0.9 % injection 10 mL (has no administration in time range)   0.9 % sodium chloride infusion (has no administration in time range)   ondansetron (ZOFRAN) injection 4 mg (has no administration in time range)   polyethylene glycol (GLYCOLAX) packet 17 g (has no administration in time range)   acetaminophen (TYLENOL) tablet 650 mg (has no administration in time range)     Or   acetaminophen (TYLENOL) suppository 650 mg (has no administration in time range)   0.9 % sodium chloride infusion (has no administration in time range)   lactated ringers bolus (0 mLs IntraVENous Stopped 8/5/22 2227)   ondansetron (ZOFRAN) injection 4 mg (4 mg IntraVENous Given 8/5/22 1936)   morphine sulfate (PF) injection 4 mg (4 mg IntraVENous Given 8/5/22 1936)   iopamidol (ISOVUE-370) 76 % injection 75 mL (75 mLs IntraVENous Given 8/5/22 2026)   cefTRIAXone (ROCEPHIN) 1,000 mg in dextrose 5 % 50 mL IVPB mini-bag (0 mg IntraVENous Stopped 8/5/22 2227)   morphine sulfate (PF) injection 4 mg (4 mg IntraVENous Given 8/5/22 2244)         Is this patient to be included in the SEP-1 Core Measure due to severe sepsis or septic shock? No   Exclusion criteria - the patient is NOT to be included for SEP-1 Core Measure due to:  2+ SIRS criteria are not met        FINAL IMPRESSION      1. Urinary tract infection without hematuria, site unspecified    2. Abdominal pain with vomiting    3. Ileus ([address]    4. Malignant ascites    5. Pleural effusion on left          DISPOSITION/PLAN   DISPOSITION Admitted 08/05/2022 10:23:08 PM      PATIENT REFERRED TO:  No follow-up provider specified.     DISCHARGE MEDICATIONS:  Current Discharge Medication List          DISCONTINUED MEDICATIONS:  Current Discharge Medication List        STOP taking these medications       losartan (COZAAR) 100 MG tablet Comments:   Reason for Stopping:         ibuprofen (ADVIL;MOTRIN) 800 MG tablet Comments:   Reason for Stopping:                      (Please note that portions of this note were completed with a voice recognition program.  Efforts were made to edit the dictations but occasionally words are mis-transcribed. )    Jonas Muñoz PA-C (electronically signed)            Jonas Muñoz PA-C  08/06/22 [id number]

## 2022-08-05 NOTE — ED NOTES
Patient identified as a positive fall risk on the ED triage fall screening. Patient placed in fall precautions which includes:  yellow fall risk bracelet on wrist and yellow socks on feet. Patient instructed on importance of not getting out of bed or ambulating without assistance for safety. Pt verbalized understanding.        Silvia Encarnacion RN  08/05/22 2281

## 2022-08-05 NOTE — ED NOTES
Straight cathed pt per order using sterile technique. Obtained 200ml kamila colored urine. Pt tolerated well. Labeled and sent specimen to lab.      Violette Rector Naegele  08/05/22 2000

## 2022-08-06 PROBLEM — Z86.711 PERSONAL HISTORY OF PULMONARY EMBOLISM: Status: ACTIVE | Noted: 2022-08-06

## 2022-08-06 PROBLEM — Z86.718 PERSONAL HISTORY OF DVT (DEEP VEIN THROMBOSIS): Status: ACTIVE | Noted: 2022-08-06

## 2022-08-06 PROBLEM — N30.00 ACUTE CYSTITIS WITHOUT HEMATURIA: Status: ACTIVE | Noted: 2022-08-06

## 2022-08-06 PROBLEM — E87.6 HYPOKALEMIA: Status: ACTIVE | Noted: 2022-08-06

## 2022-08-06 LAB
ANION GAP SERPL CALCULATED.3IONS-SCNC: 10 MMOL/L (ref 3–16)
BANDED NEUTROPHILS RELATIVE PERCENT: 28 % (ref 0–7)
BASOPHILS ABSOLUTE: 0 K/UL (ref 0–0.2)
BASOPHILS RELATIVE PERCENT: 0 %
BUN BLDV-MCNC: 14 MG/DL (ref 7–20)
CALCIUM SERPL-MCNC: 9.1 MG/DL (ref 8.3–10.6)
CHLORIDE BLD-SCNC: 100 MMOL/L (ref 99–110)
CO2: 26 MMOL/L (ref 21–32)
CREAT SERPL-MCNC: <0.5 MG/DL (ref 0.6–1.2)
EKG ATRIAL RATE: 110 BPM
EKG DIAGNOSIS: NORMAL
EKG P AXIS: 14 DEGREES
EKG P-R INTERVAL: 150 MS
EKG Q-T INTERVAL: 344 MS
EKG QRS DURATION: 66 MS
EKG QTC CALCULATION (BAZETT): 465 MS
EKG R AXIS: -7 DEGREES
EKG T AXIS: 19 DEGREES
EKG VENTRICULAR RATE: 110 BPM
EOSINOPHILS ABSOLUTE: 0 K/UL (ref 0–0.6)
EOSINOPHILS RELATIVE PERCENT: 1 %
GFR AFRICAN AMERICAN: >60
GFR NON-AFRICAN AMERICAN: >60
GLUCOSE BLD-MCNC: 99 MG/DL (ref 70–99)
HCT VFR BLD CALC: 27.8 % (ref 36–48)
HEMOGLOBIN: 9.2 G/DL (ref 12–16)
LYMPHOCYTES ABSOLUTE: 0.7 K/UL (ref 1–5.1)
LYMPHOCYTES RELATIVE PERCENT: 16 %
MCH RBC QN AUTO: 27.8 PG (ref 26–34)
MCHC RBC AUTO-ENTMCNC: 33 G/DL (ref 31–36)
MCV RBC AUTO: 84.2 FL (ref 80–100)
METAMYELOCYTES RELATIVE PERCENT: 1 %
MONOCYTES ABSOLUTE: 0.8 K/UL (ref 0–1.3)
MONOCYTES RELATIVE PERCENT: 18 %
NEUTROPHILS ABSOLUTE: 2.8 K/UL (ref 1.7–7.7)
NEUTROPHILS RELATIVE PERCENT: 36 %
PDW BLD-RTO: 13.8 % (ref 12.4–15.4)
PLATELET # BLD: 124 K/UL (ref 135–450)
PMV BLD AUTO: 8.4 FL (ref 5–10.5)
POTASSIUM REFLEX MAGNESIUM: 3.6 MMOL/L (ref 3.5–5.1)
RBC # BLD: 3.3 M/UL (ref 4–5.2)
SODIUM BLD-SCNC: 136 MMOL/L (ref 136–145)
WBC # BLD: 4.3 K/UL (ref 4–11)

## 2022-08-06 PROCEDURE — 93010 ELECTROCARDIOGRAM REPORT: CPT | Performed by: INTERNAL MEDICINE

## 2022-08-06 PROCEDURE — 85025 COMPLETE CBC W/AUTO DIFF WBC: CPT

## 2022-08-06 PROCEDURE — 6360000002 HC RX W HCPCS: Performed by: PHYSICIAN ASSISTANT

## 2022-08-06 PROCEDURE — 6370000000 HC RX 637 (ALT 250 FOR IP): Performed by: INTERNAL MEDICINE

## 2022-08-06 PROCEDURE — 97166 OT EVAL MOD COMPLEX 45 MIN: CPT

## 2022-08-06 PROCEDURE — 6360000002 HC RX W HCPCS: Performed by: INTERNAL MEDICINE

## 2022-08-06 PROCEDURE — 97162 PT EVAL MOD COMPLEX 30 MIN: CPT

## 2022-08-06 PROCEDURE — 97116 GAIT TRAINING THERAPY: CPT

## 2022-08-06 PROCEDURE — 2580000003 HC RX 258: Performed by: INTERNAL MEDICINE

## 2022-08-06 PROCEDURE — 94761 N-INVAS EAR/PLS OXIMETRY MLT: CPT

## 2022-08-06 PROCEDURE — 80048 BASIC METABOLIC PNL TOTAL CA: CPT

## 2022-08-06 PROCEDURE — 97530 THERAPEUTIC ACTIVITIES: CPT

## 2022-08-06 PROCEDURE — 1200000000 HC SEMI PRIVATE

## 2022-08-06 PROCEDURE — 2700000000 HC OXYGEN THERAPY PER DAY

## 2022-08-06 RX ORDER — MORPHINE SULFATE 15 MG/1
15 TABLET ORAL EVERY 4 HOURS PRN
Status: DISCONTINUED | OUTPATIENT
Start: 2022-08-06 | End: 2022-08-10

## 2022-08-06 RX ORDER — MIDODRINE HYDROCHLORIDE 5 MG/1
10 TABLET ORAL
Status: DISCONTINUED | OUTPATIENT
Start: 2022-08-06 | End: 2022-08-10

## 2022-08-06 RX ORDER — ASPIRIN 81 MG/1
81 TABLET, CHEWABLE ORAL DAILY
Status: DISCONTINUED | OUTPATIENT
Start: 2022-08-06 | End: 2022-08-10

## 2022-08-06 RX ORDER — SODIUM CHLORIDE 9 MG/ML
INJECTION, SOLUTION INTRAVENOUS CONTINUOUS
Status: DISCONTINUED | OUTPATIENT
Start: 2022-08-06 | End: 2022-08-10

## 2022-08-06 RX ORDER — ACETAMINOPHEN 325 MG/1
650 TABLET ORAL EVERY 4 HOURS PRN
Status: DISCONTINUED | OUTPATIENT
Start: 2022-08-06 | End: 2022-08-10 | Stop reason: HOSPADM

## 2022-08-06 RX ORDER — SODIUM CHLORIDE 1000 MG
1 TABLET, SOLUBLE MISCELLANEOUS 2 TIMES DAILY WITH MEALS
Status: DISCONTINUED | OUTPATIENT
Start: 2022-08-06 | End: 2022-08-10

## 2022-08-06 RX ORDER — POLYETHYLENE GLYCOL 3350 17 G/17G
17 POWDER, FOR SOLUTION ORAL DAILY PRN
Status: DISCONTINUED | OUTPATIENT
Start: 2022-08-06 | End: 2022-08-10 | Stop reason: HOSPADM

## 2022-08-06 RX ORDER — SODIUM CHLORIDE 9 MG/ML
INJECTION, SOLUTION INTRAVENOUS PRN
Status: DISCONTINUED | OUTPATIENT
Start: 2022-08-06 | End: 2022-08-10

## 2022-08-06 RX ORDER — ATORVASTATIN CALCIUM 10 MG/1
10 TABLET, FILM COATED ORAL DAILY
Status: DISCONTINUED | OUTPATIENT
Start: 2022-08-06 | End: 2022-08-10

## 2022-08-06 RX ORDER — MEGESTROL ACETATE 40 MG/ML
400 SUSPENSION ORAL DAILY
Status: DISCONTINUED | OUTPATIENT
Start: 2022-08-06 | End: 2022-08-10

## 2022-08-06 RX ORDER — SODIUM CHLORIDE 0.9 % (FLUSH) 0.9 %
10 SYRINGE (ML) INJECTION EVERY 12 HOURS SCHEDULED
Status: DISCONTINUED | OUTPATIENT
Start: 2022-08-06 | End: 2022-08-10

## 2022-08-06 RX ORDER — SODIUM CHLORIDE 0.9 % (FLUSH) 0.9 %
10 SYRINGE (ML) INJECTION PRN
Status: DISCONTINUED | OUTPATIENT
Start: 2022-08-06 | End: 2022-08-10 | Stop reason: HOSPADM

## 2022-08-06 RX ORDER — ONDANSETRON 2 MG/ML
4 INJECTION INTRAMUSCULAR; INTRAVENOUS EVERY 4 HOURS PRN
Status: DISCONTINUED | OUTPATIENT
Start: 2022-08-06 | End: 2022-08-10

## 2022-08-06 RX ORDER — ACETAMINOPHEN 650 MG/1
650 SUPPOSITORY RECTAL EVERY 4 HOURS PRN
Status: DISCONTINUED | OUTPATIENT
Start: 2022-08-06 | End: 2022-08-10 | Stop reason: HOSPADM

## 2022-08-06 RX ORDER — PANTOPRAZOLE SODIUM 40 MG/1
40 TABLET, DELAYED RELEASE ORAL
Status: DISCONTINUED | OUTPATIENT
Start: 2022-08-06 | End: 2022-08-10

## 2022-08-06 RX ORDER — FLUTICASONE PROPIONATE 50 MCG
1 SPRAY, SUSPENSION (ML) NASAL DAILY
Status: DISCONTINUED | OUTPATIENT
Start: 2022-08-06 | End: 2022-08-10

## 2022-08-06 RX ADMIN — SODIUM CHLORIDE, PRESERVATIVE FREE 10 ML: 5 INJECTION INTRAVENOUS at 20:37

## 2022-08-06 RX ADMIN — MIDODRINE HYDROCHLORIDE 10 MG: 5 TABLET ORAL at 09:51

## 2022-08-06 RX ADMIN — MIDODRINE HYDROCHLORIDE 10 MG: 5 TABLET ORAL at 16:14

## 2022-08-06 RX ADMIN — ASPIRIN 81 MG: 81 TABLET, CHEWABLE ORAL at 09:41

## 2022-08-06 RX ADMIN — POTASSIUM CHLORIDE 10 MEQ: 7.46 INJECTION, SOLUTION INTRAVENOUS at 01:28

## 2022-08-06 RX ADMIN — ALTEPLASE 1 MG: 2.2 INJECTION, POWDER, LYOPHILIZED, FOR SOLUTION INTRAVENOUS at 13:46

## 2022-08-06 RX ADMIN — APIXABAN 5 MG: 5 TABLET, FILM COATED ORAL at 09:42

## 2022-08-06 RX ADMIN — SODIUM CHLORIDE, PRESERVATIVE FREE 10 ML: 5 INJECTION INTRAVENOUS at 09:42

## 2022-08-06 RX ADMIN — SODIUM CHLORIDE: 9 INJECTION, SOLUTION INTRAVENOUS at 20:36

## 2022-08-06 RX ADMIN — MORPHINE SULFATE 15 MG: 15 TABLET ORAL at 13:34

## 2022-08-06 RX ADMIN — Medication 1 G: at 16:14

## 2022-08-06 RX ADMIN — MIDODRINE HYDROCHLORIDE 10 MG: 5 TABLET ORAL at 12:32

## 2022-08-06 RX ADMIN — PANTOPRAZOLE SODIUM 40 MG: 40 TABLET, DELAYED RELEASE ORAL at 09:51

## 2022-08-06 RX ADMIN — MORPHINE SULFATE 15 MG: 15 TABLET ORAL at 04:26

## 2022-08-06 RX ADMIN — SERTRALINE 25 MG: 50 TABLET, FILM COATED ORAL at 09:41

## 2022-08-06 RX ADMIN — APIXABAN 5 MG: 5 TABLET, FILM COATED ORAL at 01:26

## 2022-08-06 RX ADMIN — MORPHINE SULFATE 15 MG: 15 TABLET ORAL at 09:51

## 2022-08-06 RX ADMIN — SODIUM CHLORIDE: 9 INJECTION, SOLUTION INTRAVENOUS at 01:25

## 2022-08-06 RX ADMIN — ATORVASTATIN CALCIUM 10 MG: 10 TABLET, FILM COATED ORAL at 09:41

## 2022-08-06 RX ADMIN — ONDANSETRON 4 MG: 2 INJECTION INTRAMUSCULAR; INTRAVENOUS at 04:30

## 2022-08-06 RX ADMIN — Medication 1 G: at 09:52

## 2022-08-06 RX ADMIN — CEFTRIAXONE SODIUM 1000 MG: 1 INJECTION, POWDER, FOR SOLUTION INTRAMUSCULAR; INTRAVENOUS at 20:38

## 2022-08-06 ASSESSMENT — PAIN SCALES - GENERAL
PAINLEVEL_OUTOF10: 9
PAINLEVEL_OUTOF10: 8
PAINLEVEL_OUTOF10: 8

## 2022-08-06 NOTE — H&P
Hospital Medicine  History and Physical    PCP: Leonila Ruelas MD  Patient Name: Ladarius Gilbert    Date of Service: Pt seen/examined on 8/5/22 and admitted to Inpatient with expected LOS greater than two midnights due to medical therapy    CHIEF COMPLAINT:  Pt c/o nausea, vomiting and abdominal pain  HISTORY OF PRESENT ILLNESS: Pt is an 79y.o. year-old female with a history of hypertension, hyperlipidemia, ovarian cancer with peritoneal carcinomatosis and has a h/o DVT and PE for which she takes Eliquis. She presents to the emergency room for evaluation following a 1 week history of loss of appetite, and recurrent episodes of nausea, vomiting and ongoing abdominal pain. She does report having a bowel movement earlier today. Her last chemotherapy was while she was in the hospital before being discharged. In the emergency room she was found to have a urinary tract infection, stable ascites, and ileus. She is being admitted for further evaluation and treatment. Associated signs and symptoms do not include fever or chills, hemoptysis, hematochezia, diarrhea, constipation or urinary symptoms. Past Medical History:        Diagnosis Date    Anxiety     Ascites     Cerebral artery occlusion with cerebral infarction [facility])     DVT (deep venous thrombosis) (HCC)     Hx of blood clots     Hyperlipidemia     Hypertension     Ovarian ca ([facility] [address] 07/10/2022    mets    Pulmonary embolism ([facility] [address] 07/19/2022    bilateral       Past Surgical History:        Procedure Laterality Date    COLONOSCOPY  09/08/2014    colon polyp removed, diverticulosis    CT BIOPSY ABDOMEN RETROPERITONEUM  07/22/2022    CT BIOPSY ABDOMEN RETROPERITONEUM 7/22/2022 Jada Osei MD [facility] CT SCAN    IVC FILTER INSERTION  07/2022       Allergies:  Patient has no known allergies. Medications Prior to Admission:    Prior to Admission medications    Medication Sig Start Date End Date Taking?  Authorizing Provider   megestrol (MEGACE) 40 MG/ML suspension Take 400 mg by mouth in the morning. Patient not taking: Reported on 8/6/2022    Historical Provider, MD   morphine (MSIR) 15 MG tablet Take 15 mg by mouth every 4 hours as needed for Pain. Historical Provider, MD   prochlorperazine (COMPAZINE) 10 MG tablet Take 10 mg by mouth every 6 hours as needed    Historical Provider, MD   apixaban (ELIQUIS) 5 MG TABS tablet Take 1 tablet by mouth in the morning and 1 tablet before bedtime. 7/29/22   Mackenzie Lagunas MD   sodium chloride 1 g tablet Take 1 tablet by mouth in the morning and 1 tablet in the evening. Take with meals. Patient not taking: Reported on 8/6/2022 7/29/22   Mackenzie Lagunas MD   [facility]) 80 MG chewable tablet Take 1 tablet by mouth every 6 hours as needed for Flatulence 7/29/22   Mackenzie Lagunas MD   midodrine (PROAMATINE) 10 MG tablet Take 1 tablet by mouth in the morning and 1 tablet at noon and 1 tablet in the evening. Take with meals. Hold if Systolic blood pressure >503. 7/29/22   Mackenzie Lagunas MD   sertraline (ZOLOFT) 50 MG tablet Take 0.5 tablets by mouth in the morning. TAKE ONE TABLET BY MOUTH DAILY. Patient taking differently: Take 50 mg by mouth in the morning. TAKE ONE TABLET BY MOUTH DAILY.  7/29/22   Mackenzie Lagunas MD   omeprazole (PRILOSEC) 20 MG delayed release capsule Take 1 capsule by mouth every morning (before breakfast) 7/14/22   Homer Colon MD   sennosides-docusate sodium (SENOKOT-S) 8.6-50 MG tablet Take 2 tablets by mouth daily 7/14/22   Homer Colon MD   polyethylene glycol Annie Mulch) 17 GM/SCOOP powder Take 17 g by mouth daily  Patient not taking: Reported on 8/6/2022 7/10/22 8/9/22  Konstantin Cavazos MD   lovastatin (MEVACOR) 40 MG tablet TAKE ONE TAB BY MOUTH ONCE NIGHTLY 6/6/22   JENI Lockwood   losartan (COZAAR) 100 MG tablet TAKE ONE TAB BY MOUTH DAILY 6/6/22 7/29/22  Jethro Barnacle, APRN   fluticasone (FLONASE) 50 MCG/ACT nasal spray SPRAY ONE SPRAY IN EACH NOSTRIL ONCE DAILY  Patient not taking: Reported on 8/6/2022 4/11/22   JENI Finch   Multiple Vitamin (MULTIVITAMINS PO) Take by mouth  Patient not taking: Reported on 8/6/2022    Historical Provider, MD   ibuprofen (ADVIL;MOTRIN) 800 MG tablet TAKE 1 TABLET EVERY 6 HOURSAS NEEDED 5/10/21 7/29/22  Ramos Chang MD   aspirin 81 MG chewable tablet Take 81 mg by mouth    Historical Provider, MD   Coenzyme Q10 10 MG CAPS Take 10 mg by mouth  Patient not taking: Reported on 8/6/2022    Historical Provider, MD   Calcium Carb-Cholecalciferol (CALCIUM CARBONATE-VITAMIN D3 PO) Take by mouth  Patient not taking: Reported on 8/6/2022    Historical Provider, MD       Family History:       Problem Relation Age of Onset    Cancer Mother         Pancreatic Cancer    Diabetes Mother     High Blood Pressure Mother     Breast Cancer Sister     Breast Cancer Maternal Aunt     Breast Cancer Maternal Cousin      Social History:   TOBACCO:   reports that she quit smoking about 26 years ago. Her smoking use included cigarettes. She has a 10.00 pack-year smoking history. She has never used smokeless tobacco.  ETOH:   reports current alcohol use of about 2.0 standard drinks per week. OCCUPATION:      REVIEW OF SYSTEMS:  A full review of systems was performed and is negative except for that which appears in the HPI    Physical Exam:    Vitals: BP (!) 121/50   Pulse (!) 103   Temp 97.5 °F (36.4 °C) (Oral)   Resp 18   Ht 5' 4\" (1.626 m)   Wt 190 lb (86.2 kg)   LMP  (LMP Unknown)   SpO2 96%   BMI 32.61 kg/m²   General appearance: Obese, chronically ill appearing 79y.o. year-old female who is alert, appears stated age and is cooperative  HEENT: Head: Normocephalic, no lesions, without obvious abnormality. Eye: Normal external eye, conjunctiva, lids cornea, PEERL. Ears: Normal external ears. Non-tender. Nose: Normal external nose, mucus membranes and septum. Pharynx: Dental Hygiene adequate. Normal buccal mucosa. valve is mildly thickened with associated mild mitral annular  calcification. The mitral valve function appears normal. There is no  significant regurgitation. Left Atrium  The left atrium is normal in size. Aortic Valve  Individual aortic valve leaflets are not clearly visualized. The aortic valve appears sclerotic but opens well. No evidence of aortic valve regurgitation. Aorta  The aortic root is normal in size. Right Ventricle  The right ventricle is mild to moderately enlarged. Right ventricular systolic function is mild to moderately reduced . TAPSE is measured at 12 mm. S' prime velocity is measured at 9 cm/s. Tricuspid Valve  Tricuspid valve is structurally normal.  Mild tricuspid regurgitation. Systolic pulmonic artery pressure (SPAP) is estimated at 48 mmHg consistent  with mild pulmonary hypertension (Right atrial pressure of 8 mmHg). Right Atrium  The right atrium is moderately dilated in size at 20 cm2. Pulmonic Valve  The pulmonic valve is not well visualized. No evidence of pulmonic valve regurgitation. Pericardial Effusion  There is a trivial localized near left and right ventricle pericardial  effusion noted. No tamponade physiology. Pleural Effusion  No pleural effusion. Miscellaneous  IVC not well visualized.   M-Mode/2D Measurements (cm)   LV Diastolic Dimension: 4.3 cm LV Systolic Dimension: 8.84 cm  LV Septum Diastolic: 1.64 cm  LV PW Diastolic: 3.38 cm       AO Root Dimension: 3.1 cm                                 AV Cusp Separation: 1.8 cm                                 LA Dimension: 3.1 cm                                 LA Area: 14.2 cm2                                 LA volume/Index: 33.7 ml /18 ml/m2  Doppler Measurements   AV Peak Velocity: 167 cm/s     MV Peak E-Wave: 63.4 cm/s  AV Peak Gradient: 11.16 mmHg   MV Peak A-Wave: 61.3 cm/s                                 MV E/A Ratio: 1.03   TR Velocity:318 cm/s  TR Gradient:40.45 mmHg  Estimated RAP:8 mmHg Estimated RVSP: 48 mmHg  E' Septal Velocity: 6.53 cm/s  E' Lateral Velocity: 8.49 cm/s  E/E' ratio: 8.6  PV Peak Velocity: 74.7 cm/s  PV Peak Gradient: 2.23 mmHg   Aortic Valve   Peak Velocity: 167 cm/s  Peak Gradient: 11.16 mmHg   Cusp Separation: 1.8 cm  Aorta   Aortic Root: 3.1 cm      XR ABDOMEN (KUB) (SINGLE AP VIEW)    Result Date: 7/29/2022  EXAMINATION: ONE SUPINE XRAY VIEW(S) OF THE ABDOMEN 7/28/2022 10:31 am COMPARISON: CT dated 7/19/2022 HISTORY: ORDERING SYSTEM PROVIDED HISTORY: abd pain TECHNOLOGIST PROVIDED HISTORY: Reason for exam:->abd pain Reason for Exam: abd pain FINDINGS: An IVC filter is in place. There is mild distension of the transverse colon. Air is noted in the rectum. No small bowel dilatation is noted. There is a paucity of small bowel gas which is a nonspecific pattern. Opacity is noted at the left lung base. An acute osseous abnormality is not identified. There is no supine evidence of free air. Bowel loops are displaced into the mid abdomen, consistent with ascites, as seen on the CT exam.     No acute process is identified. No convincing evidence of bowel obstruction. Ascites. CT GUIDED NEEDLE PLACEMENT    Result Date: 7/22/2022  PROCEDURE: CT GUIDED CORE BIOPSY MESENTERIC NODULARITY MODERATE CONSCIOUS SEDATION 7/22/2022 HISTORY: ORDERING SYSTEM PROVIDED HISTORY: Mesenteric nodularity TECHNOLOGIST PROVIDED HISTORY: Reason for exam:->Mesenteric nodularity PHYSICIANS: Kellee Rodriguez SEDATION: Moderate sedation was ordered and supervised by the attending with physician face-to-face monitoring. Medications were provided and recorded by Radiology nurses. 0.5 mg Versed and 25 mcg fentanyl was administered over the course of 20 minutes. TECHNIQUE: Informed consent was obtained after a detailed discussion about the procedure including the risk, benefits, and alternatives. Universal protocol was followed.   Sterile gowns, masks, hats and gloves utilized for maximal sterile barrier. Axial images were obtained through the abdomen and a suitable skin site was prepped and draped in sterile fashion. Local anesthesia was achieved with lidocaine. Core biopsy was performed with CT guidance. Four 18 gauge core biopsy specimens were obtained using coaxial technique and the patient tolerated the procedure well. A clean dry sterile dressing was placed. Time: 3.0 minutes Dose: 3848. 13 mGy-cm Estimated blood loss: Less than 5 cc Dose modulation, iterative reconstruction, and/or weight based adjustment of the mA/kV was utilized to reduce the radiation dose to as low as reasonably achievable. FINDINGS: No bleeding postprocedure. Successful CT guided core biopsy of mesenteric nodularity. CT ABDOMEN PELVIS W IV CONTRAST Additional Contrast? None    Result Date: 8/5/2022  EXAMINATION: CT OF THE ABDOMEN AND PELVIS WITH CONTRAST 8/5/2022 8:12 pm TECHNIQUE: CT of the abdomen and pelvis was performed with the administration of intravenous contrast. Multiplanar reformatted images are provided for review. Automated exposure control, iterative reconstruction, and/or weight based adjustment of the mA/kV was utilized to reduce the radiation dose to as low as reasonably achievable. COMPARISON: July 19, 2022 CT abdomen and pelvis and chest. HISTORY: ORDERING SYSTEM PROVIDED HISTORY: Abdominal pain, vomiting, distention TECHNOLOGIST PROVIDED HISTORY: Additional Contrast?->None Reason for exam:->Abdominal pain, vomiting, distention Decision Support Exception - unselect if not a suspected or confirmed emergency medical condition->Emergency Medical Condition (MA) Reason for Exam: n/v/d for a week, patient has weakness as well Relevant Medical/Surgical History: ovarian CA diagnosed 3 weeks ago FINDINGS: Lower Chest: Cardiomegaly. Left lower pulmonary consolidation and moderate effusion. Filling defect noted within the right lower lobar pulmonary artery. Organs:  The abdominal wall appears normal. The liver, spleen, pancreas, and adrenals appear normal.  Moderate ascites. Peritoneal carcinomatosis appears somewhat improved. Gallbladder normal. Multiple simple right renal cysts measuring up to 11 mm. No further follow-up required as a appears simple/benign. Bladder is not well demonstrated. Uterus normal. GI/Bowel: Multiple air-fluid levels throughout the colon. Appendix normal. Stomach appears normal.  Air-fluid levels noted in the small bowel. Pelvis: As above. Peritoneum/Retroperitoneum: The abdominal aorta and iliac arteries are normal in caliber. There is no pathologic adenopathy. IVC filter present below the renal veins. Bones/Soft Tissues: Normal     Right lower lobe pulmonary emboli unchanged. Increased left lower lobe pulmonary consolidation. Moderate left effusion unchanged. Moderate ascites unchanged. Peritoneal carcinomatosis appears somewhat improved. Ileus. CT ABDOMEN PELVIS W IV CONTRAST Additional Contrast? None    Result Date: 7/19/2022  EXAMINATION: CT OF THE ABDOMEN AND PELVIS WITH CONTRAST; CTA OF THE CHEST 7/19/2022 8:06 am TECHNIQUE: CT of the abdomen and pelvis was performed with the administration of intravenous contrast. Multiplanar reformatted images are provided for review. Automated exposure control, iterative reconstruction, and/or weight based adjustment of the mA/kV was utilized to reduce the radiation dose to as low as reasonably achievable.; CTA of the chest was performed after the administration of intravenous contrast.  Multiplanar reformatted images are provided for review. MIP images are provided for review. Automated exposure control, iterative reconstruction, and/or weight based adjustment of the mA/kV was utilized to reduce the radiation dose to as low as reasonably achievable.  COMPARISON: None 07/10/2022 HISTORY: ORDERING SYSTEM PROVIDED HISTORY: pain TECHNOLOGIST PROVIDED HISTORY: Additional Contrast?->None Reason for exam:->pain Decision Support Exception - unselect if not a suspected or confirmed emergency medical condition->Emergency Medical Condition (MA) Reason for Exam: abd pain, diarrhea x 1 week; ORDERING SYSTEM PROVIDED HISTORY: SOB, CA TECHNOLOGIST PROVIDED HISTORY: Reason for exam:->SOB, [place] Decision Support Exception - unselect if not a suspected or confirmed emergency medical condition->Emergency Medical Condition (MA) Reason for Exam: sob x 3 days Relevant Medical/Surgical History: smoked x many years. Quit in 30's FINDINGS: Chest: Mediastinum: Thyroid gland is unremarkable. No intimal flap is seen in aorta. Pulmonary embolus is seen in the distal left main pulmonary artery, extending into the left upper left lower lobe branches. Pulmonary embolus is seen in the right upper lobe, right middle lobe, right lower lobe pulmonary artery. No pulmonary artery enlargement. Main pulmonary artery measures 3.1 cm. Right ventricle is mildly dilated. There is some dilatation of the IVC and hepatic veins. Lungs/pleura: Small to moderate left-sided pleural effusion is seen. There is adjacent left basilar consolidation. Bandlike opacity seen in the lingula Tiny right-sided pleural effusion is seen. There is adjacent right basilar consolidation. Soft Tissues/Bones: Spurring is seen in the spine. Spurring is seen in the shoulder joints Abdomen/Pelvis: Organs: No splenomegaly. Right adrenal gland is normal.  Left adrenal gland is normal. No intrahepatic ductal dilatation seen. Gallbladder is contracted No pancreatic ductal dilatation. No hydronephrosis on the right or left Hypodense nodule seen in the right kidney, measuring 10 mm, likely cyst. GI/Bowel: Abdominal and pelvic ascites is seen, similar to prior. Nodularity of the omentum-mesentery anteriorly is seen, similar to prior. No bowel obstruction. Scattered colonic diverticula are seen Pelvis: Nodularity along the peritoneal reflections is again seen, similar to prior.  There is nodularity of the left ovary, measuring 3.6 cm There is nodularity of the right ovary measuring 3.5 cm, similar to prior. Nodularity along the peritoneal reflections, abuts and likely narrows the sigmoid colon in the pelvis Peritoneum/Retroperitoneum: Atherosclerotic change seen in aorta. Small retroperitoneal nodes are seen, similar to prior. An index node on the left, measures 11 mm Bones/Soft Tissues: Spurring is seen in the spine. Spurring is seen in the hips     Chest: Bilateral pulmonary emboli, left greater than right, with bilateral pleural effusions, left greater than right and adjacent consolidation of the lungs, likely atelectasis. There is mild dilatation of the right heart, IVC and hepatic veins suggesting a component of right heart insufficiency. Results discussed with [address] [place] [address] by Fede Monae. Frank Coyle MD at 8:52 Am on 7/19/2022 Abdomen and pelvis: Abdominal and pelvic ascites with findings of peritoneal carcinomatosis, presumably due to ovarian carcinoma due to the nodular and irregular appearance of the ovaries. CT ABDOMEN PELVIS W IV CONTRAST Additional Contrast? None    Result Date: 7/10/2022  EXAMINATION: CT OF THE ABDOMEN AND PELVIS WITH CONTRAST 7/10/2022 5:28 pm TECHNIQUE: CT of the abdomen and pelvis was performed with the administration of intravenous contrast. Multiplanar reformatted images are provided for review. Automated exposure control, iterative reconstruction, and/or weight based adjustment of the mA/kV was utilized to reduce the radiation dose to as low as reasonably achievable.  COMPARISON: None HISTORY: ORDERING SYSTEM PROVIDED HISTORY: abd pain TECHNOLOGIST PROVIDED HISTORY: Additional Contrast?->None Reason for exam:->abd pain Decision Support Exception - unselect if not a suspected or confirmed emergency medical condition->Emergency Medical Condition (MA) Reason for Exam: mid abd pain x1 1/2 months,worse now Additional signs and symptoms: feels swollen and sore,no nausea, no vomiting Relevant abdomen which is more prominent on the right and extends into the omentum inferiorly into the lower pelvis consistent with omental caking. Bones/Soft Tissues: The bones are intact. No aggressive osseous lesion is seen     Moderate ascites throughout the abdomen and pelvis and extensive soft tissue stranding and nodular masses throughout the mesentery extending into the omentum in keeping with omental caking which is most likely due to metastatic disease. Recommend PET-CT correlation. Ill-defined heterogeneous soft tissue mass in the left lower pelvis measuring 10 x 9 cm which could represent an ovarian mass and a 6 cm mass in the left lower pelvis which could represent metastatic disease or adenopathy. Recommend gyn consultation and suggest ultrasound correlation. Moderate diverticulosis of left colon with no pericolonic inflammation and no bowel obstruction. Mild periaortic adenopathy which is probably due to metastatic disease. Small bibasilar pleural effusions and associated mild bibasilar atelectasis or infiltrates. Multiple enlarged right paracardiac lymph nodes which is probably due to metastatic disease Mild hepatomegaly and chronic liver disease with fatty replacement throughout and mild splenomegaly. Small right renal cyst with no hydronephrosis or urinary obstruction. XR CHEST PORTABLE    Result Date: 8/5/2022  EXAMINATION: ONE XRAY VIEW OF THE CHEST 8/5/2022 7:11 pm COMPARISON: 07/19/2022 HISTORY: Generalized weakness, abdominal pain and vomiting. FINDINGS: Left basilar opacity likely due to patient rotation. Left sided PICC with tip in the SVC. Stable cardiomediastinal contours. No acute airspace disease, significant pleural effusion, or visualized pneumothorax. No acute abnormality allowing for patient rotation.      XR CHEST PORTABLE    Result Date: 7/19/2022  EXAMINATION: ONE XRAY VIEW OF THE CHEST 7/19/2022 7:47 am COMPARISON: 06/10/2008 HISTORY: ORDERING SYSTEM PROVIDED HISTORY: sob TECHNOLOGIST PROVIDED HISTORY: Reason for exam:->sob Reason for Exam: sob FINDINGS: The lungs are without acute focal process. There is no effusion or pneumothorax. The cardiomediastinal silhouette is stable. The osseous structures are stable. No acute process. Bibasilar hypoaeration     CT CHEST PULMONARY EMBOLISM W CONTRAST    Result Date: 7/19/2022  EXAMINATION: CT OF THE ABDOMEN AND PELVIS WITH CONTRAST; CTA OF THE CHEST 7/19/2022 8:06 am TECHNIQUE: CT of the abdomen and pelvis was performed with the administration of intravenous contrast. Multiplanar reformatted images are provided for review. Automated exposure control, iterative reconstruction, and/or weight based adjustment of the mA/kV was utilized to reduce the radiation dose to as low as reasonably achievable.; CTA of the chest was performed after the administration of intravenous contrast.  Multiplanar reformatted images are provided for review. MIP images are provided for review. Automated exposure control, iterative reconstruction, and/or weight based adjustment of the mA/kV was utilized to reduce the radiation dose to as low as reasonably achievable. COMPARISON: None 07/10/2022 HISTORY: ORDERING SYSTEM PROVIDED HISTORY: pain TECHNOLOGIST PROVIDED HISTORY: Additional Contrast?->None Reason for exam:->pain Decision Support Exception - unselect if not a suspected or confirmed emergency medical condition->Emergency Medical Condition (MA) Reason for Exam: abd pain, diarrhea x 1 week; ORDERING SYSTEM PROVIDED HISTORY: SOB, CA TECHNOLOGIST PROVIDED HISTORY: Reason for exam:->SOB, [place] Decision Support Exception - unselect if not a suspected or confirmed emergency medical condition->Emergency Medical Condition (MA) Reason for Exam: sob x 3 days Relevant Medical/Surgical History: smoked x many years. Quit in 30's FINDINGS: Chest: Mediastinum: Thyroid gland is unremarkable. No intimal flap is seen in aorta.  Pulmonary embolus is seen in the distal left main pulmonary artery, extending into the left upper left lower lobe branches. Pulmonary embolus is seen in the right upper lobe, right middle lobe, right lower lobe pulmonary artery. No pulmonary artery enlargement. Main pulmonary artery measures 3.1 cm. Right ventricle is mildly dilated. There is some dilatation of the IVC and hepatic veins. Lungs/pleura: Small to moderate left-sided pleural effusion is seen. There is adjacent left basilar consolidation. Bandlike opacity seen in the lingula Tiny right-sided pleural effusion is seen. There is adjacent right basilar consolidation. Soft Tissues/Bones: Spurring is seen in the spine. Spurring is seen in the shoulder joints Abdomen/Pelvis: Organs: No splenomegaly. Right adrenal gland is normal.  Left adrenal gland is normal. No intrahepatic ductal dilatation seen. Gallbladder is contracted No pancreatic ductal dilatation. No hydronephrosis on the right or left Hypodense nodule seen in the right kidney, measuring 10 mm, likely cyst. GI/Bowel: Abdominal and pelvic ascites is seen, similar to prior. Nodularity of the omentum-mesentery anteriorly is seen, similar to prior. No bowel obstruction. Scattered colonic diverticula are seen Pelvis: Nodularity along the peritoneal reflections is again seen, similar to prior. There is nodularity of the left ovary, measuring 3.6 cm There is nodularity of the right ovary measuring 3.5 cm, similar to prior. Nodularity along the peritoneal reflections, abuts and likely narrows the sigmoid colon in the pelvis Peritoneum/Retroperitoneum: Atherosclerotic change seen in aorta. Small retroperitoneal nodes are seen, similar to prior. An index node on the left, measures 11 mm Bones/Soft Tissues: Spurring is seen in the spine. Spurring is seen in the hips     Chest: Bilateral pulmonary emboli, left greater than right, with bilateral pleural effusions, left greater than right and adjacent consolidation of the lungs, likely atelectasis. There is mild dilatation of the right heart, IVC and hepatic veins suggesting a component of right heart insufficiency. Results discussed with [address] [place] [address] by Leroy Matthews. Martir Mott MD at 8:52 Am on 7/19/2022 Abdomen and pelvis: Abdominal and pelvic ascites with findings of peritoneal carcinomatosis, presumably due to ovarian carcinoma due to the nodular and irregular appearance of the ovaries. IR PICC WO SQ PORT/PUMP > 5 YEARS    Result Date: 7/25/2022  EXAMINATION: LIMITED ULTRASOUND OF THE ARM FOR PICC ACCESS, 7/25/2022 TECHNIQUE: The PICC team used ultrasound and fluoroscopy guidance to place a PICC line. HISTORY: ORDERING SYSTEM PROVIDED HISTORY: chemo TECHNOLOGIST PROVIDED HISTORY: Reason for exam:->chemo How many lumens are being requested?->2 What site is the preferred site? ->No preference What side should this line be placed? ->Either Reason for Exam: limited access FLUOROSCOPY DOSE AND TYPE OR TIME AND EXPOSURES: Ka, r: 3 mGy FINDINGS: Ultrasound images demonstrate patency of the left brachial vein which was used for access for placement of a PICC by the PICC team, without a radiologist present. Fluoroscopy guidance was used by the PICC team.  The tip is at the junction of the SVC/RA. By report, a 5.5 Serbian dual lumen PICC was placed. Successful placement of PICC line. US ABDOMEN LIMITED    Result Date: 8/1/2022  EXAMINATION: LIMITED ABDOMINAL ULTRASOUND 7/28/2022 2:36 pm COMPARISON: 07/25/2022. HISTORY: ORDERING SYSTEM PROVIDED HISTORY: ascites and abd discomfort. History of abdominal/pelvic carcinoma. TECHNOLOGIST PROVIDED HISTORY: Not enough fluid for paracentesis Reason for exam:->ascites and abd discomfort. History of abdominal/pelvic carcinoma. FINDINGS: Ultrasonographic examination of all four quadrants was performed in this patient scheduled for paracentesis. Only a small amount of ascites is visualized. Due to the small volume of ascites, paracentesis was not performed. Cancellation of scheduled paracentesis due to lack of significant ascites as described above. US ABDOMEN LIMITED    Result Date: 7/25/2022  EXAMINATION: RIGHT UPPER QUADRANT ULTRASOUND 7/25/2022 12:05 pm COMPARISON: None. HISTORY: ORDERING SYSTEM PROVIDED HISTORY: Therapeutic - biopsied the mesentery last week. Should have path. TECHNOLOGIST PROVIDED HISTORY: Reason for exam:->Therapeutic - biopsied the mesentery last week. Should have path. FINDINGS: Limited ultrasound of the abdomen which demonstrated small amount of ascitic fluid. There is no significant fluid noted for a therapeutic paracentesis. Findings were discussed with the patient. The procedure was aborted. Limited ultrasound of the abdomen demonstrating a small amount of intra abdominal ascites. Recommend comparison with clinical exam and consider follow-up paracentesis in a few days if fluid increases for therapeutic paracentesis. CT BIOPSY ABDOMEN RETROPERITONEUM    Result Date: 7/22/2022  PROCEDURE: CT GUIDED CORE BIOPSY MESENTERIC NODULARITY MODERATE CONSCIOUS SEDATION 7/22/2022 HISTORY: ORDERING SYSTEM PROVIDED HISTORY: Mesenteric nodularity TECHNOLOGIST PROVIDED HISTORY: Reason for exam:->Mesenteric nodularity PHYSICIANS: Kellee Rodriguez SEDATION: Moderate sedation was ordered and supervised by the attending with physician face-to-face monitoring. Medications were provided and recorded by Radiology nurses. 0.5 mg Versed and 25 mcg fentanyl was administered over the course of 20 minutes. TECHNIQUE: Informed consent was obtained after a detailed discussion about the procedure including the risk, benefits, and alternatives. Universal protocol was followed. Sterile gowns, masks, hats and gloves utilized for maximal sterile barrier. Axial images were obtained through the abdomen and a suitable skin site was prepped and draped in sterile fashion. Local anesthesia was achieved with lidocaine.   Core biopsy was performed with CT guidance. Four 18 gauge core biopsy specimens were obtained using coaxial technique and the patient tolerated the procedure well. A clean dry sterile dressing was placed. Time: 3.0 minutes Dose: 3848. 13 mGy-cm Estimated blood loss: Less than 5 cc Dose modulation, iterative reconstruction, and/or weight based adjustment of the mA/kV was utilized to reduce the radiation dose to as low as reasonably achievable. FINDINGS: No bleeding postprocedure. Successful CT guided core biopsy of mesenteric nodularity. VL Extremity Venous Bilateral    Result Date: 7/22/2022  Lower Extremities DVT Study  Demographics   Patient Name       Virgilio Umana   Date of Study      07/19/2022         Gender              Female   Patient Number     [id number]         Date of Birth       1955   Visit Number       [id number]          Age                 79 year(s)   Accession Number   [id number]         Room Number         7234   Corporate ID       [id number]            Matthew Ngo   Ordering Physician Delphine Aguilar MD           Physician           Readers                                                            Joanna Melton MD  Procedure Type of Study:   Veins:Lower Extremities DVT Study, VASC EXTREMITY VENOUS DUPLEX BILATERAL. Vascular Sonographer Report  Indications for Study:Swelling and Leg pain. Additional Indications:Patient scanned bedside in ED with complaints of bilateral lower extremity swelling. Cramping pain in left calf x 3 weeks. Left > Right. Patient also noted palpable knot located at right medial thigh. Patient is positive for pulmonary embolism.  Venous Duplex Scan: B-mode imaging of the deep and superficial veins, with compression maneuvers, including color and Doppler spectral waveform analysis. Impressions Right Impression Acute partially to totally occluding superficial venous thrombosis involving the right greater saphenous vein from proximal thigh to mid calf (tip of thrombus measures 1.15 cm from the SFJ, total length of thrombus is approximately 33 cm). Acute totally occluding superficial venous thrombosis involving a tributary off the greater saphenous vein located at mid thigh. No other evidence of deep vein or superficial vein thrombosis involving the right lower extremity. Incidental finding of pulsatile venous flow noted in the common femoral artery. Left Impression Acute totally occluding deep vein thrombosis involving the left distal femoral, popliteal, gastroc (2 of 2), posterior tibial (2 of2), peroneal (2 of 2) and soleal veins. No other evidence of deep vein or superficial vein thrombosis involving the left lower extremity. Incidental finding of pulsatile venous flow most commonly associated with fluid volume overload. Conclusions   Summary   Acute partially to totally occluding superficial venous thrombosis involving  the right greater saphenous vein. Acute totally occluding deep vein thrombosis involving the left distal  femoral, popliteal, gastroc (2 of 2), posterior tibial (2 of2) , peroneal (2  of 2) and soleal veins. No other evidence of deep vein or superficial vein thrombosis involving the  bilateral lower extremities. Signature   ------------------------------------------------------------------  Electronically signed by Layne Linda MD (Interpreting  physician) on 07/22/2022 at 05:27 PM  ------------------------------------------------------------------  Patient Status:ER. Study [facility] - Vascular Lab.  Technical Quality:Adequate visualization.   - Results were reported to:Jorje ASTORGA RN in ED @ 12:44pm. Velocities are measured in cm/s ; Diameters are measured in mm Right Lower Extremities DVT Study Measurements Right 2D Measurements +------------------------+----------+---------------+----------+ ! Location                ! Visualized! Compressibility! Thrombosis! +------------------------+----------+---------------+----------+ ! Sapheno Femoral Junction! Yes       ! Yes            ! None      ! +------------------------+----------+---------------+----------+ ! GSV Thigh               ! Yes       ! Partial        !Acute     ! +------------------------+----------+---------------+----------+ ! Common Femoral          !Yes       ! Yes            ! None      ! +------------------------+----------+---------------+----------+ ! Femoral                 !Yes       ! Yes            ! None      ! +------------------------+----------+---------------+----------+ ! Prox Femoral            !Yes       ! Yes            ! None      ! +------------------------+----------+---------------+----------+ ! Mid Femoral             !Yes       ! Yes            ! None      ! +------------------------+----------+---------------+----------+ ! Dist Femoral            !Yes       ! Yes            ! None      ! +------------------------+----------+---------------+----------+ ! Deep Femoral            !Yes       ! Yes            ! None      ! +------------------------+----------+---------------+----------+ ! Popliteal               !Yes       ! Yes            ! None      ! +------------------------+----------+---------------+----------+ ! GSV Below Knee          ! Yes       ! Yes            ! None      ! +------------------------+----------+---------------+----------+ ! Gastroc                 ! Yes       ! Yes            ! None      ! +------------------------+----------+---------------+----------+ ! Soleal                  !Yes       ! Yes            ! None      ! +------------------------+----------+---------------+----------+ ! PTV                     ! Yes       ! Yes            ! None      ! +------------------------+----------+---------------+----------+ ! Peroneal                !Yes       ! Yes            ! None      ! +------------------------+----------+---------------+----------+ ! GSV Calf                ! Yes       ! Yes            ! None      ! +------------------------+----------+---------------+----------+ ! SSV                     ! Yes       ! Yes            ! None      ! +------------------------+----------+---------------+----------+ Right Doppler Measurements +------------------------+---------+------+------------+ ! Location                ! Signal   !Reflux! Reflux (sec)! +------------------------+---------+------+------------+ ! Sapheno Femoral Junction! Pulsatile! No    !            ! +------------------------+---------+------+------------+ ! Common Femoral          !Phasic   ! No    !            ! +------------------------+---------+------+------------+ ! Femoral                 !Phasic   ! No    !            ! +------------------------+---------+------+------------+ ! Deep Femoral            !Phasic   ! No    !            ! +------------------------+---------+------+------------+ ! Popliteal               !Phasic   ! No    !            ! +------------------------+---------+------+------------+ Left Lower Extremities DVT Study Measurements Left 2D Measurements +------------------------+----------+---------------+----------+ ! Location                ! Visualized! Compressibility! Thrombosis! +------------------------+----------+---------------+----------+ ! Sapheno Femoral Junction! Yes       ! Yes            ! None      ! +------------------------+----------+---------------+----------+ ! GSV Thigh               ! Yes       ! Yes            ! None      ! +------------------------+----------+---------------+----------+ ! Common Femoral          !Yes       ! Yes            ! None      ! +------------------------+----------+---------------+----------+ ! Femoral                 !Yes       ! Yes            ! None      ! +------------------------+----------+---------------+----------+ ! Prox Femoral            !Yes       ! Yes            ! None      ! +------------------------+----------+---------------+----------+ ! Mid Femoral             !Yes       ! Yes            ! None      ! +------------------------+----------+---------------+----------+ ! Dist Femoral            !Yes       ! No             !Acute     ! +------------------------+----------+---------------+----------+ ! Deep Femoral            !Yes       ! Yes            ! None      ! +------------------------+----------+---------------+----------+ ! Popliteal               !Yes       ! No             !Acute     ! +------------------------+----------+---------------+----------+ ! GSV Below Knee          ! Yes       ! Yes            ! None      ! +------------------------+----------+---------------+----------+ ! Gastroc                 ! Yes       ! No             !Acute     ! +------------------------+----------+---------------+----------+ ! Soleal                  !Yes       ! No             !Acute     ! +------------------------+----------+---------------+----------+ ! PTV                     ! Yes       ! No             !Acute     ! +------------------------+----------+---------------+----------+ ! Peroneal                !Yes       ! No             !Acute     ! +------------------------+----------+---------------+----------+ ! GSV Calf                ! Yes       ! Yes            ! None      ! +------------------------+----------+---------------+----------+ ! SSV                     ! Yes       ! Yes            ! None      ! +------------------------+----------+---------------+----------+ Left Doppler Measurements +------------------------+---------+------+------------+ ! Location                ! Signal   !Reflux! Reflux (sec)! +------------------------+---------+------+------------+ ! Sapheno Femoral Junction! Pulsatile! No    !            ! +------------------------+---------+------+------------+ ! Common Femoral          !Pulsatile! No    !            ! +------------------------+---------+------+------------+ ! Femoral !Pulsatile! No    !            ! +------------------------+---------+------+------------+ ! Dist Femoral            !Absent   ! No    !            ! +------------------------+---------+------+------------+ ! Deep Femoral            !Pulsatile! No    !            ! +------------------------+---------+------+------------+ ! Popliteal               !Absent   ! No    !            ! +------------------------+---------+------+------------+        Assessment:   Principal Problem:    Intractable nausea and vomiting  Active Problems:    Generalized abdominal pain    Peritoneal carcinomatosis (HCC)    Poor appetite    Ovarian cancer (HCC)    Hypokalemia    Acute cystitis without hematuria    Personal history of DVT (deep vein thrombosis)    Personal history of pulmonary embolism    HTN (hypertension)    HLD (hyperlipidemia)  Resolved Problems:    * No resolved hospital problems. *      Plan:       Ovarian cancer [facility]) with Peritoneal carcinomatosis ([address] - last underwent chemotherapy prior to her last discharge. Will consult Oncology    Intractable nausea and vomiting - Will provide symptomatic treatment with Zofran and/or Phenergan as needed, maintenance of fluids and electrolytes. Generalized abdominal pain - due to above. Provide symptomatic treatment       Poor appetite - due to above. Will supply high calorie, high protein dietary supplements      Hypokalemia - replace Potassium and recheck in the am    Acute cystitis without significant hematuria - patient was started on Rocephin empirically. Urine culture and sensitivities have been ordered, and antibiotic therapy will be adjusted as necessary based upon those results. Personal history of DVT (deep vein thrombosis), pulmonary embolism continue Eliquis    Essential (primary) hypertension - per history. Not currently requiring antihypertensive medications. Monitor blood pressure    Hyperlipidemia - No current evidence of Rhabdomyolysis or other adverse effects.  Continue statin therapy while in the hospital    Non-morbid obesity due to excess calories (Body mass index is 32.61 kg/m². ) - Complicating assessment and treatment. Placing patient at risk for multiple co-morbidities as well as early death and contributing to the patient's presentation. Code Status: Full Code  Diet: ADULT DIET; Regular  DVT Prophylaxis: Eliquis    (Advanced care planning has been discussed with patient and/or responsible family member and is reflected in the code status.  Further orders associated with this have been entered if appropriate)    Disposition: Anticipate that patient will remain in the hospital for 2 or more midnights depending on further evaluation and clinical course     Please note that over 50 minutes was spent in evaluating the patient, review of records and results, discussion with staff/family, etc.      Makayla Rajan MD

## 2022-08-06 NOTE — PROGRESS NOTES
Physical Therapy  Facility/Department: [facility] C3 TELE/MED SURG/ONC  Physical Therapy Initial Assessment    Name: Troy Gonzalez  : 1955  MRN: [id number]  Date of Service: 2022    Discharge Recommendations:  24 hour supervision or assist, Home with Home health PT   PT Equipment Recommendations  Equipment Needed: No      Patient Diagnosis(es): The primary encounter diagnosis was Urinary tract infection without hematuria, site unspecified. Diagnoses of Abdominal pain with vomiting, Ileus ([address] Malignant ascites, and Pleural effusion on left were also pertinent to this visit. Past Medical History:  has a past medical history of Anxiety, Ascites, Cerebral artery occlusion with cerebral infarction [facility]), DVT (deep venous thrombosis) ([address] Hx of blood clots, Hyperlipidemia, Hypertension, Ovarian ca ([address] and Pulmonary embolism ([address] Past Surgical History:  has a past surgical history that includes Colonoscopy (2014); CT BIOPSY ABDOMEN RETROPERITONEUM (2022); and IVC filter insertion (2022). Assessment   Body Structures, Functions, Activity Limitations Requiring Skilled Therapeutic Intervention: Decreased functional mobility ; Decreased ADL status; Decreased ROM; Decreased body mechanics; Decreased tolerance to work activity; Decreased balance;Decreased endurance;Decreased safe awareness;Decreased strength; Increased pain;Decreased posture;Decreased high-level IADLs  Assessment: Pt is 80 y/o F who presents w/ Dx: Ilius. Pt reports that PLOF req use of RW and w/c (for long distances > 100 ft) and assistance in ADL's/home care from sisters. Pt presents below baseline in that she grossly req CGA for OOB mobility and use of RW w/ decreased activity tolerance. Pt will benefit from continued skilled PT services in acute setting and will benefit from DC to home w/ 24 assist and HHPT to improve level of function.   Treatment Diagnosis: Decreased functional mobility  Therapy Prognosis: Good  Decision constipation or urinary symptoms. \"  Response To Previous Treatment: Not applicable  Family / Caregiver Present: Yes (Sister's)  Referring Practitioner: Rafael Garnica MD  Referral Date : 08/06/22  Diagnosis: Ileus  Follows Commands: Within Functional Limits  General Comment  Comments: Pt cleared for PT eval  Subjective  Subjective: Pt agreeable to PT eval         Social/Functional History  Social/Functional History  Lives With: Alone  Type of Home: House  Home Layout: One level, Laundry in basement  Home Access: Stairs to enter with rails  Entrance Stairs - Number of Steps: 3 (side entrance)  Entrance Stairs - Rails: Left  Bathroom Shower/Tub: Tub/Shower unit, Walk-in shower  Bathroom Toilet: Standard  Bathroom Equipment: 3-in-1 commode, Hand-held shower, Tub transfer bench, Grab bars in shower, Grab bars around toilet, Toilet raiser  Home Equipment: Shermon Alistair, Walker, rolling  Has the patient had two or more falls in the past year or any fall with injury in the past year?: No  Receives Help From: Family  ADL Assistance: Needs assistance  Bath:  (neice and sister aides)  Toileting: Needs assistance  Homemaking Assistance: Needs assistance  Homemaking Responsibilities: No  Ambulation Assistance: Independent  Transfer Assistance: Independent  Active : Yes  Occupation: Retired  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[place] [address]: [id number] [place] [address] Rd to the pool, swimming, working in the yard    CLAUDE Weiss  Overall Orientation Status: Within [address]  Overall Cognitive Status: Exceptions  Arousal/Alertness: Appropriate responses to stimuli  Following Commands:  Follows multistep commands consistently  Attention Span: Appears intact  Memory: Appears intact  Safety Judgement: Decreased awareness of need for safety  Problem Solving: Assistance required to generate solutions  Insights: Decreased awareness of deficits  Initiation: Does not require cues  Sequencing: Does not require cues     Objective   Heart Rate: (!) 117  Heart Rate Source: Monitor  BP: (!) 130/90  BP Location: Right upper arm  BP Method: Automatic  Patient Position: Semi fowlers  MAP (Calculated): 103.33  Resp: 16  SpO2: 96 %  O2 Device: Nasal cannula (3L)     Observation/Palpation  Posture: Good  Gross Assessment  AROM: Within functional limits  PROM: Within functional limits  Strength: Within functional limits  Coordination: Within functional limits  Tone: Normal  Sensation: Intact                 Bed Mobility Training  Bed Mobility Training: No  Supine to Sit: Stand-by assistance (use of bed rail to L, HOB elevated)  Sit to Supine: Other (comment) (pt up in chair at end of session)  Balance  Sitting: Intact  Standing: Intact  Transfer Training  Transfer Training: Yes  Overall Level of Assistance: Contact-guard assistance  Sit to Stand: Contact-guard assistance; Adaptive equipment (RW)  Stand to Sit: Contact-guard assistance; Adaptive equipment  Bed to Chair: Contact-guard assistance (hand held assist)  Gait Training  Gait Training: Yes  Gait  Overall Level of Assistance: Contact-guard assistance  Speed/Fabienne: Pace decreased (< 100 feet/min); Slow  Step Length: Left shortened;Right shortened  Gait Abnormalities: Trunk sway increased  Distance (ft): 50 Feet  Assistive Device: Walker, rolling              Balance  Posture: Fair  Sitting - Static: Good  Sitting - Dynamic: Good  Standing - Static: Fair;+  Standing - Dynamic: Fair  Comments: Pt performed mutiple bouts of static and dynamic stance while PT assisted w/ pericare and clothing management.     AM-PAC Score  AM-PAC Inpatient Mobility Raw Score : 17 (08/06/22 1532)  AM-PAC Inpatient T-Scale Score : 42.13 (08/06/22 1532)  Mobility Inpatient CMS 0-100% Score: 50.57 (08/06/22 1532)  Mobility Inpatient CMS G-Code Modifier : CK (08/06/22 1532)    Goals  Short Term Goals  Time Frame for Short term goals: 1 week, 8/13/22  Short term goal 1: Pt will perform bed mobility w/ Stewart  Short term goal 2: Pt will perform sit to stand w/ SBA and LRAD  Short term goal 3: Pt will perform amb for 100 ft w/ LRAD and SBA w/o LOB  Short term goal 4: Pt will perform 12-15 reps of BLE exercises to improve function towards goals by 8/9  Patient Goals   Patient goals : \"to go home\"       Education  Patient Education  Education Given To: Patient; Family  Education Provided: Role of Therapy;Plan of Care  Education Provided Comments: Pt educated on role of PT for current defcits. Education Method: Verbal  Barriers to Learning: None  Education Outcome: Verbalized understanding;Demonstrated understanding      Therapy Time   Individual Concurrent Group Co-treatment   Time In 9747         Time Out 1515         Minutes 43         Timed Code Treatment Minutes: 33 Minutes plus 10 min malik Magaña PT   If pt is unable to be seen after this session, please let this note serve as discharge summary. Please see case management note for discharge disposition. Thank you.

## 2022-08-06 NOTE — PROGRESS NOTES
Hospitalist Progress Note  8/6/2022 11:23 AM  Subjective:   Admit Date: 8/5/2022  PCP: Ariel Yanez MD Status: Inpatient  Interval History: Hospital Day: 2, admitted with acute cystitis and intractable nausea with vomiting. Hypokalemia, potassium replaced. 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 Pulmonary embolus (7/19) s/p IVC filter. CT abd / pelvis (8/5) showed ascites and improved peritoneal carcinomatosis. Port placement pending (8/10). Eliquis held (8/6).       Diet: NPO  Left brachial double lumen PICC (7/25, day #13)  Urinary catheter (8/6, day #1)  Medications:   Sodium chloride at 75 ml/hr  sodium chloride  1 g Oral BID WC   sertraline  25 mg Oral Daily   pantoprazole  40 mg Oral QAM AC   midodrine  10 mg Oral TID WC   megestrol  400 mg Oral Daily   atorvastatin  10 mg Oral Daily   fluticasone  1 spray Each Nostril Daily   aspirin  81 mg Oral Daily   Apixaban (Eliquis)  5 mg Oral BID [held, 8/6]   ceftriaxone  1,000 mg IntraVENous Q24H (8/5, day #2)     Recent Labs     08/05/22 1924 08/06/22  0600   WBC 3.9* 4.3   HGB 9.9* 9.2*    124*   MCV 84.3 84.2     Recent Labs     08/05/22 1924 08/06/22  0600   * 136   K 3.1* 3.6   CL 97* 100   CO2 26 26   BUN 15 14   CREATININE <0.5* <0.5*   GLUCOSE 126* 99     Recent Labs     08/05/22 1924   AST 32   ALT 22   BILITOT 0.6   ALKPHOS 65     Blood culture x 2 (8/5) pending  Urinalysis (8/5) 2+ bacteria / pos nitrite / neg leuk est  Lactate (8/5) 1.2 mmol/L    Objective:   Vitals:  /89   Pulse 108   Temp 97.9 °F (oral)   Resp 16   Ht 5' 4\" (1.626 m)   Wt 190 lb (86.2 kg)    SpO2 96% on RA  BMI 32.61 kg/m²   General appearance: alert and cooperative with exam  Lungs: clear to auscultation bilaterally  Heart: regular rate and rhythm, S1, S2 normal, no murmur, click, rub or gallop  Abdomen: soft, non-tender; bowel sounds normal; no masses,  no organomegaly  Extremities: extremities normal, atraumatic, no cyanosis or edema  Neurologic: No obvious focal neurologic deficits. Assessment and Plan:   Acute cystitis treated with ceftriaxone. High grade serous ovarian carcinoma with peritoneal metastasis (7/22) and received Carbo/Taxol #1 (7/26) with next course due (8/16). CT abd / pelvis (8/5) showed ascites and improved peritoneal carcinomatosis  Pulmonary embolus (7/19) s/p IVC filter. Continues on apixaban (Eliquis) 5 mg BID. Hypokalemia secondary to vomiting:  Replace potassium. Check magnesium. Anorexia with poor appetite:  Trial of megestrol. Dyslipidemia on atorvastatin 10 mg nightly. Gastric reflux and stress ulcer prophylaxis with pantoprazole (Protonix) 40 mg daily. Class I obesity (BMI 39.8) complicates medical management.     Advance Directive: Full Code  DVT prophylaxis with apixaban (Eliquis) 5 mg BID (PE / DVT)  Discharge planning: two more days inpatient status      PELON Castillo MD  [facility] Hospitalist

## 2022-08-06 NOTE — CARE COORDINATION
CASE MANAGEMENT INITIAL ASSESSMENT      Reviewed chart and completed assessment with patient:bedside  Family present: friend Angela  Explained Case Management role/services. Primary contact information:[facility] Decision Maker :   Primary Decision Maker: Dahlia Barclay - Brother/Sister - [phone]    Primary Decision Maker: Jay Jay Ambrose - Niece/Nephew - [phone]    Secondary Decision Maker: Stanford Kumar - Brother/Sister - [phone]          Can this person be reached and be able to respond quickly, such as within a few minutes or hours? Yes      Admit date/status:8/5/22  Diagnosis:ileus   Is this a Readmission?:  Yes      Insurance:medicare   Precert required for SNF: No       3 night stay required: No    Living arrangements, Adls, care needs, prior to admission:lives in ranch style home alone. 1-2 SHON    Durable Medical Equipment at home:  Walker_x_Cane__RTS__ BSC_x_Shower Chair_x_  02_x 3LNC continuous provided by [facility]_ N__ CPAP__  BiPap__  Hospital Bed__ W/C__x_ Other_____    Services in the home and/or outpatient, prior to admission:[facility]    Current PCP:Tim                                Medications Prescription coverage? yes  Transportation needs: none       PT/OT recs:none    Hospital Exemption Notification (HEN):needed for SNF    Barriers to discharge:none    Plan/comments:spoke with patient and friend. Reported from home alone. Has support of familly and friend. Will have tank for transport home. Ok to resume [facility] at d/c. CM will follow clinical progress for needs.  Raphael Kathleen RN      ECOC on chart for MD signature

## 2022-08-06 NOTE — ED PROVIDER NOTES
EKG obtained today in the emergency department shows a sinus tachycardia with a ventricular rate of 110. Otherwise, intervals normal.  No ST segment elevation, ST segment depression, hyperacute T waves. Left axis deviation. Q waves in lead III. These are new compared to July 20, 2022. Otherwise similar morphologies. Katy Anton, DO       [address] [facility]  08/05/22 4141

## 2022-08-06 NOTE — ACP (ADVANCE CARE PLANNING)
Advance Care Planning     General Advance Care Planning (ACP) Conversation    Date of Conversation: 8/5/2022  Conducted with: Patient with Decision Making Capacity    Healthcare Decision Maker:    Primary Decision Maker: Dahlia Barclay - Brother/Sister - [phone]    Primary Decision Maker: Betito Kaplan - Niece/Nephew - [phone]    Secondary Decision Maker: Anna Cristy - Brother/Sister - [phone]  Click here to complete Healthcare Decision Makers including selection of the Healthcare Decision Maker Relationship (ie \"Primary\"). Today we documented Decision Maker(s) consistent with Legal Next of Kin hierarchy.     Content/Action Overview:  Has NO ACP documents/care preferences - information provided, considering goals and options  Reviewed DNR/DNI and patient elects Full Code (Attempt Resuscitation)    Length of Voluntary ACP Conversation in minutes:  <16 minutes (Non-Billable)    Vern Infante RN

## 2022-08-06 NOTE — PROGRESS NOTES
Occupational Therapy  Facility/Department: [facility] C3 TELE/MED SURG/ONC  Occupational Therapy Initial Assessment and Treatment     Name: Yong Green  : 1955  MRN: [id number]  Date of Service: 2022    Discharge Recommendations:  24 hour supervision or assist, Home with Home health OT  OT Equipment Recommendations  Equipment Needed: No       Patient Diagnosis(es): The primary encounter diagnosis was Urinary tract infection without hematuria, site unspecified. Diagnoses of Abdominal pain with vomiting, Ileus ([facility] [address] Malignant ascites, and Pleural effusion on left were also pertinent to this visit. Past Medical History:  has a past medical history of Anxiety, Ascites, Cerebral artery occlusion with cerebral infarction [facility]), DVT (deep venous thrombosis) ([facility] [address] Hx of blood clots, Hyperlipidemia, Hypertension, Ovarian ca ([facility] [address] and Pulmonary embolism ([facility] [address] Past Surgical History:  has a past surgical history that includes Colonoscopy (2014); CT BIOPSY ABDOMEN RETROPERITONEUM (2022); and IVC filter insertion (2022). Assessment   Performance deficits / Impairments: Decreased functional mobility ; Decreased strength;Decreased endurance;Decreased coordination;Decreased ADL status; Decreased safe awareness;Decreased balance  Assessment: Yong Green is a(n) 79 y.o. female admitted to [facility]  with increassed weakness, pt with recent admission with d/c  after Dx of ovarian CA. Prior to admission, pt reports living independently. Since most recent admission, pt has required A with I/ADLs with family/friends present . Due to generalized deconditioning, pt required:   Functional Mobility: CGA for supine <>sit, CGA for sit <> stand, CGA with HHA for bed to chair  ADL Training: set up for face washing  Pt required minimal verbal cues for safety, including line management . Pt is functioning below baseline and would benefit from cont OT while in acute care.  Once medically appropriate, rec Home with 24/7 Assist/Supervision  and HHOT upon d/c. Prognosis: Good  Decision Making: Medium Complexity  REQUIRES OT FOLLOW-UP: Yes  Activity Tolerance  Activity Tolerance: Patient Tolerated treatment well        Plan   Plan  Times per Week: 3-5  Current Treatment Recommendations: Strengthening, Balance training, Functional mobility training, Endurance training, Safety education & training, Equipment evaluation, education, & procurement, Patient/Caregiver education & training, Self-Care / ADL     Restrictions  Restrictions/Precautions  Restrictions/Precautions: NPO  Position Activity Restriction  Other position/activity restrictions: up with assist, shrestha, up to chair, maintain heels off bed    Subjective   General  Chart Reviewed: Yes  Patient assessed for rehabilitation services?: Yes  Referring Practitioner: Jose Hernandez MD  Diagnosis: Intractable nausea and vomiting  Subjective  Subjective: pt in bed at arrival, reporting 7/10 pain in abdomen; agreeable to therapy with encouragement; RN approved  C/o abdominal pain and nausea with mobility; does not rate pain.     Social/Functional History  Social/Functional History  Lives With: Alone  Type of Home: House  Home Layout: One level, Laundry in basement  Home Access: Stairs to enter with rails  Entrance Stairs - Number of Steps: 3 (side entrance)  Entrance Stairs - Rails: Left  Bathroom Shower/Tub: Tub/Shower unit, Walk-in shower  Bathroom Toilet: Standard  Bathroom Equipment: 3-in-1 commode, Hand-held shower, Tub transfer bench, Grab bars in shower, Grab bars around toilet, Toilet raiser  Home Equipment: Oxygen, Wheelchair-manual, Walker, rolling  Has the patient had two or more falls in the past year or any fall with injury in the past year?: No  Receives Help From: Family  ADL Assistance: Needs assistance  Bath:  (neice and sister aides)  Toileting: Needs assistance  Homemaking Assistance: Needs assistance  Homemaking Responsibilities: No  Ambulation Assistance: Independent  Transfer Assistance: Independent  Active : Yes  Occupation: Retired  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[place] [address] Blvd: Going to the pool, swimming, working in the yard       Objective   Heart Rate: (!) 117  Heart Rate Source: Monitor  BP: (!) 130/90  BP Location: Right upper arm  BP Method: Automatic  Patient Position: Semi fowlers  MAP (Calculated): 103.33  Resp: 16  SpO2: 96 %  O2 Device: Nasal cannula (3L)          Observation/Palpation  Posture: Good  Edema: BLE edema  Safety Devices  Type of Devices: All fall risk precautions in place;Call light within reach; Chair alarm in place;Gait belt;Left in chair;Nurse notified; Patient at risk for falls  Bed Mobility Training  Bed Mobility Training: Yes  Supine to Sit: Stand-by assistance (use of bed rail to L, HOB elevated)  Sit to Supine: Other (comment) (pt up in chair at end of session)  Transfer Training  Transfer Training: Yes  Sit to Stand: Contact-guard assistance (hand held assist)  Stand to Sit: Contact-guard assistance (hand held assist)  Bed to Chair: Contact-guard assistance (hand held assist)     AROM: Generally decreased, functional  PROM: Generally decreased, functional  Strength: Generally decreased, functional  Coordination: Generally decreased, functional  Tone: Normal  Sensation:  (did not endorse numbness/tingling)    ADL  Grooming: Setup  Grooming Skilled Clinical Factors: seated to wash face  Toileting: Dependent/Total  Toileting Skilled Clinical Factors: fady              Vision  Vision: Impaired (ocassionally for reading)  Hearing  Hearing: Within functional limits    Cognition  Overall Cognitive Status: Exceptions  Arousal/Alertness: Appropriate responses to stimuli  Following Commands:  Follows multistep commands consistently  Attention Span: Appears intact  Memory: Appears intact  Safety Judgement: Decreased awareness of need for safety  Problem Solving: Assistance

## 2022-08-06 NOTE — CONSULTS
Consult placed  Consult sent via perfect serve  Who:Dr. Afsaneh Nath  Date:8/6/2022,  Time:7:38 AM        Electronically signed by Randall Townsend on 8/6/2022 at 7:38 AM

## 2022-08-06 NOTE — PROGRESS NOTES
Shift assessment completed and charted. VSS. A&OX4. PRN pain meds given,see MAR. De León draining kamila urine. PICC line in place. Bed alarm on. Q2 turn. NPO. Bed locked and in lowest position. Call light within reach. Pt denies any other needs at this time. Will continue to monitor.

## 2022-08-06 NOTE — ED PROVIDER NOTES
Emergency Department Attending Provider Note  Location: [facility]  ED  8/5/2022     Patient Identification  Kathie Espinoza is a 79 y.o. female      Kathie Espinoza was evaluated in the Emergency Department for fatigue abdominal distention. Although initial history and physical exam information was obtained by ELKIN/NPP/MD/DO (who also dictated a record of this visit), I personally saw the patient and performed a substantive portion of the visit including all aspects of the medical decision making. EKG Interpretation  Rhythm is sinus tachycardia  Rate is 110  Axis is normal  No STEMI criteria nonspecific ST depressions noted in the anterolateral leads  Qtc is 465      Patient seen and evaluated. Relevant records reviewed. Patient here with fatigue and abdominal pain. Patient chronically ill-appearing on exam her vitals notable for borderline tachycardia. She has impressive ascites on exam confirmed by CT imaging which also shows an ileus and her work-up was also notable for nitrite positive acute cystitis. May benefit from inpatient treatment bowel rest until her ileus improves and possibly therapeutic paracentesis. At this time lower concern for acute cardiopulmonary process other than her known bilateral PEs. I, Dr. Kenyatta Quan, am the primary clinician of record. I personally saw the patient and independently provided 0 minutes of non-concurrent critical care out of the total shared critical care time provided. This chart was generated in part by using Dragon Dictation system and may contain errors related to that system including errors in grammar, punctuation, and spelling, as well as words and phrases that may be inappropriate. If there are any questions or concerns please feel free to contact the dictating provider for clarification.      MD Chaya Hutton MD  08/05/22 1435

## 2022-08-06 NOTE — CONSULTS
ONCOLOGY HEMATOLOGY CARE CONSULT NOTE      Requesting Physician:  Dr. Paco Draper:  Ovarian cancer        HISTORY OF PRESENT ILLNESS:      Ms. Gogo Bella  is a 79 y.o. female we are seeing in consultation for ovarian cancer. This patient was diagnosed with a high grade serous carcinoma of the ovary by CT-guided biopsy of the mesentery on 7/22/2022. She received Carbo/Taxol #1 on 7/26/2022. She is due again on 8/16/2022. She also had a PE that was diagnosed by CTPA on 7/19/2022. She has an IVC filter in place. She was admitted with a UTI and nausea. A CT on the day of admission showed ascites and improved peritoneal carcinomatosis.     Past Medical History:    Past Medical History:   Diagnosis Date    Anxiety     Ascites     Cerebral artery occlusion with cerebral infarction [facility])     DVT (deep venous thrombosis) (HCC)     Hx of blood clots     Hyperlipidemia     Hypertension     Ovarian ca ([facility] [address] 07/10/2022    mets    Pulmonary embolism ([facility] [address] 07/19/2022    bilateral     Past Surgical History:    Past Surgical History:   Procedure Laterality Date    COLONOSCOPY  09/08/2014    colon polyp removed, diverticulosis    CT BIOPSY ABDOMEN RETROPERITONEUM  07/22/2022    CT BIOPSY ABDOMEN RETROPERITONEUM 7/22/2022 Miya Adams MD [facility] CT SCAN    IVC FILTER INSERTION  07/2022       Current Medications:    Current Facility-Administered Medications   Medication Dose Route Frequency Provider Last Rate Last Admin    sodium chloride tablet 1 g  1 g Oral BID GERMAINE Johnson MD        sertraline (ZOLOFT) tablet 25 mg  25 mg Oral Daily Trung Johnson MD   25 mg at 08/06/22 0941    pantoprazole (PROTONIX) tablet 40 mg  40 mg Oral QAM AC Trung Johnson MD        morphine (MSIR) tablet 15 mg  15 mg Oral Q4H PRN Trung Johnson MD   15 mg at 08/06/22 0426    midodrine (PROAMATINE) tablet 10 mg  10 mg Oral TID GERMAINE Johnson MD        megestrol (MEGACE) 40 MG/ML suspension 400 mg  400 mg Oral Daily David Berry MD        atorvastatin (LIPITOR) tablet 10 mg  10 mg Oral Daily David Berry MD   10 mg at 22 0941    fluticasone (FLONASE) 50 MCG/ACT nasal spray 1 spray  1 spray Each Nostril Daily David Berry MD        aspirin chewable tablet 81 mg  81 mg Oral Daily David Berry MD   81 mg at 22 0941    apixaban (ELIQUIS) tablet 5 mg  5 mg Oral BID David Berry MD   5 mg at 22 9382    cefTRIAXone (ROCEPHIN) 1,000 mg in dextrose 5 % 50 mL IVPB mini-bag  1,000 mg IntraVENous Q24H David Berry MD        sodium chloride flush 0.9 % injection 10 mL  10 mL IntraVENous 2 times per day David Berry MD   10 mL at 22 0942    sodium chloride flush 0.9 % injection 10 mL  10 mL IntraVENous PRN David Berry MD        0.9 % sodium chloride infusion   IntraVENous PRN David Berry MD        ondansetron [facility]) injection 4 mg  4 mg IntraVENous Q4H PRN David Berry MD   4 mg at 22 0430    polyethylene glycol (GLYCOLAX) packet 17 g  17 g Oral Daily PRN David Berry MD        acetaminophen (TYLENOL) tablet 650 mg  650 mg Oral Q4H PRN David Berry MD        Or    acetaminophen (TYLENOL) suppository 650 mg  650 mg Rectal Q4H PRN David Berry MD        0.9 % sodium chloride infusion   IntraVENous Continuous David Berry MD 75 mL/hr at 22 0125 New Bag at 22 0125     Allergies:  No Known Allergies    Social History:   Social History     Socioeconomic History    Marital status: Single     Spouse name: Not on file    Number of children: Not on file    Years of education: 12    Highest education level: Not on file   Occupational History    Occupation: retired   Tobacco Use    Smoking status: Former     Packs/day: 0.50     Years: 20.00     Pack years: 10.00     Types: Cigarettes     Quit date: 5/3/1996     Years since quittin.2    Smokeless tobacco: Never   Vaping Use    Vaping Use: Never used   Substance and Sexual Activity Alcohol use: Yes     Alcohol/week: 2.0 standard drinks     Types: 2 Cans of beer per week     Comment: Not currently drinking    Drug use: No    Sexual activity: Not Currently     Partners: Male   Other Topics Concern    Not on file   Social History Narrative    Not on file     Social Determinants of Health     Financial Resource Strain: Low Risk     Difficulty of Paying Living Expenses: Not hard at all   Food Insecurity: No Food Insecurity    Worried About [address] in the Last Year: Never true    [address] [employer] in the Last Year: Never true   Transportation Needs: No Transportation Needs    Lack of Transportation (Medical): No    Lack of Transportation (Non-Medical):  No   Physical Activity: Inactive    Days of Exercise per Week: 0 days    Minutes of Exercise per Session: 0 min   Stress: Not on file   Social Connections: Not on file   Intimate Partner Violence: Not on file   Housing Stability: Low Risk     Unable to Pay for Housing in the Last Year: No    Number of Places Lived in the Last Year: 1    Unstable Housing in the Last Year: No          Family History:     Family History   Problem Relation Age of Onset    Cancer Mother         Pancreatic Cancer    Diabetes Mother     High Blood Pressure Mother     Breast Cancer Sister     Breast Cancer Maternal Aunt     Breast Cancer Maternal Cousin      REVIEW OF SYSTEMS:    Review of Systems    PHYSICAL EXAM:      Vitals:  /89   Pulse (!) 108   Temp 97.9 °F (36.6 °C) (Oral)   Resp 18   Ht 5' 4\" (1.626 m)   Wt 190 lb (86.2 kg)   LMP  (LMP Unknown)   SpO2 96%   BMI 32.61 kg/m²     CONSTITUTIONAL:  awake, alert, cooperative, no apparent distress, and appears stated age NAD  EYES:  pupils equal, round and reactive to light, extra ocular muscles intact,sclera clear, conjunctiva normal  NECK:  Supple, symmetrical, trachea midline, no adenopathy, thyroid symmetric, not enlarged and no tenderness, skin normal  HEMATOLOGIC/LYMPHATICS:  no cervical placed, 7/20/2022, with Dr. Silver Angeles with Eliquis. 3.) UTI  - Ceftriaxone started, 8/10/2022    4.) Nausea/anorexia  - Megace   - Zofran  - Reportedly too little ascites for paracentesis on 7/25/2022. Can always revisit that on Monday. Thank you for asking me to see the patient.        Yuriy Ragland MD  PleaseContact Through Perfect Serve

## 2022-08-07 LAB
ANION GAP SERPL CALCULATED.3IONS-SCNC: 11 MMOL/L (ref 3–16)
ANISOCYTOSIS: ABNORMAL
BANDED NEUTROPHILS RELATIVE PERCENT: 16 % (ref 0–7)
BASOPHILS ABSOLUTE: 0 K/UL (ref 0–0.2)
BASOPHILS RELATIVE PERCENT: 0 %
BUN BLDV-MCNC: 17 MG/DL (ref 7–20)
CALCIUM SERPL-MCNC: 8.2 MG/DL (ref 8.3–10.6)
CHLORIDE BLD-SCNC: 102 MMOL/L (ref 99–110)
CO2: 24 MMOL/L (ref 21–32)
CREAT SERPL-MCNC: <0.5 MG/DL (ref 0.6–1.2)
DOHLE BODIES: PRESENT
EOSINOPHILS ABSOLUTE: 0.1 K/UL (ref 0–0.6)
EOSINOPHILS RELATIVE PERCENT: 1 %
GFR AFRICAN AMERICAN: >60
GFR NON-AFRICAN AMERICAN: >60
GLUCOSE BLD-MCNC: 85 MG/DL (ref 70–99)
HCT VFR BLD CALC: 28 % (ref 36–48)
HEMATOLOGY PATH CONSULT: NO
HEMOGLOBIN: 9.4 G/DL (ref 12–16)
LYMPHOCYTES ABSOLUTE: 0.6 K/UL (ref 1–5.1)
LYMPHOCYTES RELATIVE PERCENT: 12 %
MACROCYTES: ABNORMAL
MCH RBC QN AUTO: 28 PG (ref 26–34)
MCHC RBC AUTO-ENTMCNC: 33.6 G/DL (ref 31–36)
MCV RBC AUTO: 83.6 FL (ref 80–100)
METAMYELOCYTES RELATIVE PERCENT: 1 %
MONOCYTES ABSOLUTE: 0.8 K/UL (ref 0–1.3)
MONOCYTES RELATIVE PERCENT: 15 %
NEUTROPHILS ABSOLUTE: 3.9 K/UL (ref 1.7–7.7)
NEUTROPHILS RELATIVE PERCENT: 55 %
PDW BLD-RTO: 13.8 % (ref 12.4–15.4)
PLATELET # BLD: 130 K/UL (ref 135–450)
PLATELET SLIDE REVIEW: ABNORMAL
PMV BLD AUTO: 7.8 FL (ref 5–10.5)
POIKILOCYTES: ABNORMAL
POTASSIUM REFLEX MAGNESIUM: 3.6 MMOL/L (ref 3.5–5.1)
RBC # BLD: 3.35 M/UL (ref 4–5.2)
SODIUM BLD-SCNC: 137 MMOL/L (ref 136–145)
WBC # BLD: 5.4 K/UL (ref 4–11)

## 2022-08-07 PROCEDURE — 94761 N-INVAS EAR/PLS OXIMETRY MLT: CPT

## 2022-08-07 PROCEDURE — 2700000000 HC OXYGEN THERAPY PER DAY

## 2022-08-07 PROCEDURE — 80048 BASIC METABOLIC PNL TOTAL CA: CPT

## 2022-08-07 PROCEDURE — 6370000000 HC RX 637 (ALT 250 FOR IP): Performed by: INTERNAL MEDICINE

## 2022-08-07 PROCEDURE — 2580000003 HC RX 258: Performed by: INTERNAL MEDICINE

## 2022-08-07 PROCEDURE — 6360000002 HC RX W HCPCS: Performed by: INTERNAL MEDICINE

## 2022-08-07 PROCEDURE — 85025 COMPLETE CBC W/AUTO DIFF WBC: CPT

## 2022-08-07 PROCEDURE — 1200000000 HC SEMI PRIVATE

## 2022-08-07 RX ADMIN — Medication 1 G: at 09:34

## 2022-08-07 RX ADMIN — PANTOPRAZOLE SODIUM 40 MG: 40 TABLET, DELAYED RELEASE ORAL at 05:51

## 2022-08-07 RX ADMIN — ONDANSETRON 4 MG: 2 INJECTION INTRAMUSCULAR; INTRAVENOUS at 05:51

## 2022-08-07 RX ADMIN — ATORVASTATIN CALCIUM 10 MG: 10 TABLET, FILM COATED ORAL at 09:34

## 2022-08-07 RX ADMIN — MORPHINE SULFATE 15 MG: 15 TABLET ORAL at 11:03

## 2022-08-07 RX ADMIN — SODIUM CHLORIDE, PRESERVATIVE FREE 10 ML: 5 INJECTION INTRAVENOUS at 19:52

## 2022-08-07 RX ADMIN — MORPHINE SULFATE 15 MG: 15 TABLET ORAL at 00:02

## 2022-08-07 RX ADMIN — MORPHINE SULFATE 15 MG: 15 TABLET ORAL at 19:52

## 2022-08-07 RX ADMIN — MIDODRINE HYDROCHLORIDE 10 MG: 5 TABLET ORAL at 09:33

## 2022-08-07 RX ADMIN — MORPHINE SULFATE 15 MG: 15 TABLET ORAL at 15:40

## 2022-08-07 RX ADMIN — ONDANSETRON 4 MG: 2 INJECTION INTRAMUSCULAR; INTRAVENOUS at 00:02

## 2022-08-07 RX ADMIN — ASPIRIN 81 MG: 81 TABLET, CHEWABLE ORAL at 09:34

## 2022-08-07 RX ADMIN — MIDODRINE HYDROCHLORIDE 10 MG: 5 TABLET ORAL at 11:03

## 2022-08-07 RX ADMIN — MORPHINE SULFATE 15 MG: 15 TABLET ORAL at 05:51

## 2022-08-07 RX ADMIN — ONDANSETRON 4 MG: 2 INJECTION INTRAMUSCULAR; INTRAVENOUS at 11:03

## 2022-08-07 RX ADMIN — SODIUM CHLORIDE: 9 INJECTION, SOLUTION INTRAVENOUS at 09:32

## 2022-08-07 RX ADMIN — Medication 1 G: at 15:40

## 2022-08-07 RX ADMIN — CEFTRIAXONE SODIUM 1000 MG: 1 INJECTION, POWDER, FOR SOLUTION INTRAMUSCULAR; INTRAVENOUS at 19:55

## 2022-08-07 RX ADMIN — SERTRALINE 25 MG: 50 TABLET, FILM COATED ORAL at 09:34

## 2022-08-07 ASSESSMENT — PAIN DESCRIPTION - LOCATION
LOCATION: ABDOMEN

## 2022-08-07 ASSESSMENT — PAIN SCALES - GENERAL
PAINLEVEL_OUTOF10: 5
PAINLEVEL_OUTOF10: 9
PAINLEVEL_OUTOF10: 5
PAINLEVEL_OUTOF10: 5
PAINLEVEL_OUTOF10: 4
PAINLEVEL_OUTOF10: 9
PAINLEVEL_OUTOF10: 8

## 2022-08-07 ASSESSMENT — PAIN DESCRIPTION - DESCRIPTORS: DESCRIPTORS: DISCOMFORT

## 2022-08-07 NOTE — PROGRESS NOTES
MD Lagunas aware of pt only having 150cc out of shrestha this RNs shift. No new orders at this time.

## 2022-08-07 NOTE — PLAN OF CARE
Problem: Pain  Goal: Verbalizes/displays adequate comfort level or baseline comfort level  8/7/2022 1048 by Keri Weeks RN  Outcome: Progressing  Flowsheets (Taken 8/7/2022 1048)  Verbalizes/displays adequate comfort level or baseline comfort level:   Encourage patient to monitor pain and request assistance   Assess pain using appropriate pain scale

## 2022-08-07 NOTE — ONCOLOGY
ONCOLOGY HEMATOLOGY CARE PROGRESS NOTE      SUBJECTIVE:     Afebrile and on 3 LPM by NC. She feels improved. Wants to try clears. ROS:   The remaining 10 point review of symptoms is unremarkable. OBJECTIVE        Physical    VITALS:  /87   Pulse 96   Temp 98.2 °F (36.8 °C) (Oral)   Resp 18   Ht 5' 4\" (1.626 m)   Wt 190 lb (86.2 kg)   LMP  (LMP Unknown)   SpO2 94%   BMI 32.61 kg/m²   TEMPERATURE:  Current - Temp: 98.2 °F (36.8 °C); Max - Temp  Av.3 °F (36.8 °C)  Min: 97.8 °F (36.6 °C)  Max: 99.4 °F (37.4 °C)  PULSE OXIMETRY RANGE: SpO2  Av.2 %  Min: 94 %  Max: 97 %  24HR INTAKE/OUTPUT:    Intake/Output Summary (Last 24 hours) at 2022 0857  Last data filed at 2022 0254  Gross per 24 hour   Intake 793 ml   Output 650 ml   Net 143 ml       CONSTITUTIONAL:  awake, alert, cooperative, no apparent distress, HEENT oral pharynx , no scleral icterus  HEMATOLOGIC/LYMPHATICS:  no cervical lymphadenopathy, no supraclavicular lymphadenopathy, no axillary lymphadenopathy and no inguinal lymphadenopathy  LUNGS:  No increased work of breathing, good air exchange, clear to auscultation bilaterally, no crackles or wheezing  CARDIOVASCULAR:  , regular rate and rhythm, normal S1 and S2, no S3 or S4, and no murmur noted  ABDOMEN:  No scars, normal bowel sounds, soft, non-distended, non-tender, no masses palpated, no hepatosplenomegally  MUSCULOSKELETAL:  There is no redness, warmth, or swelling of the joints. EXTREMETIES: No clubbing cynosis or edema  NEUROLOGIC:  Awake, alert, oriented to name, place and time. Cranial nerves II-XII are grossly intact. Motor is 5 out of 5 bilaterally.    SKIN:  no bruising or bleeding      Data      Recent Labs     22  1924 22  0600 22  0450   WBC 3.9* 4.3 5.4   HGB 9.9* 9.2* 9.4*   HCT 29.8* 27.8* 28.0*    124* 130*   MCV 84.3 84.2 83.6        Recent Labs     22  1924 22  0600 22  0456 * 136 137   K 3.1* 3.6 3.6   CL 97* 100 102   CO2 26 26 24   BUN 15 14 17   CREATININE <0.5* <0.5* <0.5*     Recent Labs     08/05/22 1924   AST 32   ALT 22   BILITOT 0.6   ALKPHOS 65       Magnesium:    Lab Results   Component Value Date/Time    MG 2.10 07/07/2022 09:28 AM         Problem List  Patient Active Problem List   Diagnosis    HTN (hypertension)    HLD (hyperlipidemia)    Low back pain    Benign neoplasm of colon    Swelling, mass, or lump in head and neck    Chronic sinusitis    BERNIE (generalized anxiety disorder)    Major depressive disorder with single episode, in full remission (HCC)    Generalized abdominal pain    Adnexal mass    Peritoneal carcinomatosis (HCC)    Malignant ascites    Poor appetite    Reactive depression    Acute pulmonary embolism (HCC)    Acute deep vein thrombosis (DVT) of proximal vein of both lower extremities (HCC)    Hyponatremia    Normocytic normochromic anemia    Ovarian cancer (HCC)    Intractable nausea and vomiting    Hypokalemia    Acute cystitis without hematuria    Personal history of DVT (deep vein thrombosis)    Personal history of pulmonary embolism       ASSESSMENT AND PLAN  1.) Probable ovarian cancer  - CA-125 20896 U/mL, 7/20/2022.  - CT abdomen/pelvis, 7/19/2022, showed abdominoperitoneal carcinomatosis with irregular/nodular ovaries bilaterally. Moderate ascites was present. - CT-guided mesenteric biopsy, 7/22/2022. 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 - US abdomen, 7/25/2022, showed too little ascites for paracentesis. - Left brachial vein PICC placed, 7/25/2022. - Carbo/Taxol #1 given on 7/26/2022. Due again on 8/16/2022. - A CT on 8/05/2022 showed improved peritoneal carcinomatosis at this early date.      2.) PE  - CTPA, 7/19/2022, showed bilateral PEs left greater than right.  - Duplex ultrasound of the BLE, 7/19/2022, showed SVT of right greater saphenous vein extending to 1.15 cm from SFJ. DVT of left distal femoral, popliteal, gastrocnemius, posterior tibial, peroneal veins.  - IVC filter placed, 7/20/2022, with Dr. Canseco [place].  - Treating with Eliquis, but it is on hold for a port later this week. 3.) UTI  - Ceftriaxone started, 8/10/2022     4.) Nausea/anorexia  - Megace  - Zofran  - Reportedly too little ascites for paracentesis on 7/25/2022. Can always revisit that on Monday.  - NPO --> clears today (8/07).       ONCOLOGIC DISPOSITION:    Rosemary Moody MD  Please contact through [facility]

## 2022-08-07 NOTE — PROGRESS NOTES
Hospitalist Progress Note  8/7/2022 9:53 AM  Subjective:   Admit Date: 8/5/2022  PCP: Kyra Ramos MD Status: Inpatient  Interval History: Hospital Day: 3, diet advanced to clear liquids. Afebrile. More abdominal distention per patient. Not enough ascites for paracentesis prior. History of present illness:  Admitted with acute cystitis and intractable nausea with vomiting. Hypokalemia, potassium replaced. 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 Pulmonary embolus (7/19) s/p IVC filter. CT abd / pelvis (8/5) showed ascites and improved peritoneal carcinomatosis. Port placement pending (8/10). Eliquis held (8/6). Diet: Clear liquids  Left brachial double lumen PICC (7/25, day #14)  Urinary catheter (8/6, day #2)  Medications:   Sodium chloride at 75 ml/hr  sodium chloride  1 g Oral BID WC   sertraline  25 mg Oral Daily   pantoprazole  40 mg Oral QAM AC   midodrine  10 mg Oral TID WC   megestrol  400 mg Oral Daily   atorvastatin  10 mg Oral Daily   fluticasone  1 spray Each Nostril Daily   aspirin  81 mg Oral Daily   apixaban (Eliquis)  5 mg Oral BID [held, 8/6]   ceftriaxone  1,000 mg IntraVENous Q24H (8/5, day #3)     Recent Labs     08/05/22 1924 08/06/22  0600 08/07/22  0450   WBC 3.9* 4.3 5.4   HGB 9.9* 9.2* 9.4*    124* 130*   MCV 84.3 84.2 83.6     Recent Labs     08/05/22 1924 08/06/22  0600 08/07/22  0450   * 136 137   K 3.1* 3.6 3.6   CL 97* 100 102   CO2 26 26 24   BUN 15 14 17   CREATININE <0.5* <0.5* <0.5*   GLUCOSE 126* 99 85     Blood culture x 2 (8/5) pending  Urinalysis (8/5) 2+ bacteria / pos nitrite / neg leuk est  Lactate (8/5) 1.2 mmol/L    CT abd / pelvis w/ IV contrast (8/5) Right lower lobe pulmonary emboli unchanged. Increased left lower lobe pulmonary consolidation. Moderate left effusion unchanged. Moderate ascites unchanged.   Peritoneal carcinomatosis appears somewhat improved. Ileus. Portable CXR (8/5) No acute abnormality allowing for patient rotation. Echocardiogram (7/19) Vigorous left ventricular systolic function with ejection fraction of >65%. No regional wall motion abnormalites are seen. The right sided chambers are enlarged. Right ventricular systolic function is mild to moderately reduced. Systolic pulmonic artery pressure (SPAP) is estimated at 48 mmHg consistent with mild pulmonary hypertension (Right atrial pressure of 8 mmHg). Objective:   Vitals:  /87   Pulse 104   Temp 98.3 °F (oral)   Resp 18   Ht 5' 4\"  Wt 86.2 kg  SpO2 96% on 3 LPM  BMI 32.61 kg/m²   General appearance: alert and cooperative with exam  Lungs: clear to auscultation bilaterally  Heart: regular rate and rhythm, S1, S2 normal, no murmur, click, rub or gallop  Abdomen: soft, non-tender; bowel sounds normal; no masses,  no organomegaly  Extremities: extremities normal, atraumatic, no cyanosis or edema  Neurologic: No obvious focal neurologic deficits. Assessment and Plan:   Acute cystitis treated with ceftriaxone. 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 CT abd / pelvis (8/5) showed ascites and improved peritoneal carcinomatosis. Pulmonary embolus (7/19) s/p IVC filter (7/20, Dr Raji Zuleta). Continues on apixaban (Eliquis) 5 mg BID, held for port later this week. Hypokalemia secondary to vomiting:  Replace potassium. Nausea / vomiting with anorexia with poor appetite:  Trial of megestrol. Dyslipidemia on atorvastatin 10 mg nightly. Gastric reflux and stress ulcer prophylaxis with pantoprazole (Protonix) 40 mg daily. Class I obesity (BMI 96.4) complicates medical management.      Advance Directive: Full Code  DVT prophylaxis with apixaban (Eliquis) 5 mg BID (PE / DVT)  Discharge planning: two more days inpatient status, plan for home with [place] Hemal Mcarthur MD  Rounding Hospitalist

## 2022-08-07 NOTE — PROGRESS NOTES
Shift assessment completed and charted. VSS. Clears started. X1 BM this AM. Up to chair. BS active. Passing flatus. De León in place, low MD HARDIK aware, kamila urine. Bed/chair alarm on. PRN pain meds, see MAR. Bed locked and in lowest position. Call light within reach. Pt denies any other needs at this time. Will continue to monitor.

## 2022-08-07 NOTE — PLAN OF CARE
Problem: Pain  Goal: Verbalizes/displays adequate comfort level or baseline comfort level  Outcome: Progressing  Flowsheets (Taken 8/6/2022 2134)  Verbalizes/displays adequate comfort level or baseline comfort level:   Encourage patient to monitor pain and request assistance   Assess pain using appropriate pain scale   Administer analgesics based on type and severity of pain and evaluate response   Implement non-pharmacological measures as appropriate and evaluate response

## 2022-08-08 ENCOUNTER — APPOINTMENT (OUTPATIENT)
Dept: ULTRASOUND IMAGING | Age: 67
DRG: 754 | End: 2022-08-08
Payer: MEDICARE

## 2022-08-08 LAB
ANION GAP SERPL CALCULATED.3IONS-SCNC: 10 MMOL/L (ref 3–16)
BASOPHILS ABSOLUTE: 0.1 K/UL (ref 0–0.2)
BASOPHILS RELATIVE PERCENT: 1 %
BUN BLDV-MCNC: 16 MG/DL (ref 7–20)
CALCIUM SERPL-MCNC: 8.8 MG/DL (ref 8.3–10.6)
CHLORIDE BLD-SCNC: 100 MMOL/L (ref 99–110)
CO2: 25 MMOL/L (ref 21–32)
CREAT SERPL-MCNC: <0.5 MG/DL (ref 0.6–1.2)
EOSINOPHILS ABSOLUTE: 0 K/UL (ref 0–0.6)
EOSINOPHILS RELATIVE PERCENT: 0 %
GFR AFRICAN AMERICAN: >60
GFR NON-AFRICAN AMERICAN: >60
GLUCOSE BLD-MCNC: 102 MG/DL (ref 70–99)
HCT VFR BLD CALC: 28.3 % (ref 36–48)
HEMOGLOBIN: 9.2 G/DL (ref 12–16)
INR BLD: 1.67 (ref 0.87–1.14)
LYMPHOCYTES ABSOLUTE: 1.4 K/UL (ref 1–5.1)
LYMPHOCYTES RELATIVE PERCENT: 18 %
MCH RBC QN AUTO: 27.3 PG (ref 26–34)
MCHC RBC AUTO-ENTMCNC: 32.5 G/DL (ref 31–36)
MCV RBC AUTO: 84.1 FL (ref 80–100)
MONOCYTES ABSOLUTE: 1.2 K/UL (ref 0–1.3)
MONOCYTES RELATIVE PERCENT: 15 %
NEUTROPHILS ABSOLUTE: 5.1 K/UL (ref 1.7–7.7)
NEUTROPHILS RELATIVE PERCENT: 66 %
NUCLEATED RED BLOOD CELLS: 1 /100 WBC
PDW BLD-RTO: 14.5 % (ref 12.4–15.4)
PLATELET # BLD: 134 K/UL (ref 135–450)
PMV BLD AUTO: 8.2 FL (ref 5–10.5)
POTASSIUM REFLEX MAGNESIUM: 3.6 MMOL/L (ref 3.5–5.1)
PROTHROMBIN TIME: 19.5 SEC (ref 11.7–14.5)
RBC # BLD: 3.36 M/UL (ref 4–5.2)
SLIDE REVIEW: ABNORMAL
SODIUM BLD-SCNC: 135 MMOL/L (ref 136–145)
WBC # BLD: 7.8 K/UL (ref 4–11)

## 2022-08-08 PROCEDURE — 2700000000 HC OXYGEN THERAPY PER DAY

## 2022-08-08 PROCEDURE — 94761 N-INVAS EAR/PLS OXIMETRY MLT: CPT

## 2022-08-08 PROCEDURE — 2580000003 HC RX 258: Performed by: INTERNAL MEDICINE

## 2022-08-08 PROCEDURE — 0W9G3ZZ DRAINAGE OF PERITONEAL CAVITY, PERCUTANEOUS APPROACH: ICD-10-PCS | Performed by: RADIOLOGY

## 2022-08-08 PROCEDURE — 6370000000 HC RX 637 (ALT 250 FOR IP): Performed by: INTERNAL MEDICINE

## 2022-08-08 PROCEDURE — 85025 COMPLETE CBC W/AUTO DIFF WBC: CPT

## 2022-08-08 PROCEDURE — 1200000000 HC SEMI PRIVATE

## 2022-08-08 PROCEDURE — 49083 ABD PARACENTESIS W/IMAGING: CPT

## 2022-08-08 PROCEDURE — 6360000002 HC RX W HCPCS: Performed by: INTERNAL MEDICINE

## 2022-08-08 PROCEDURE — 85610 PROTHROMBIN TIME: CPT

## 2022-08-08 PROCEDURE — 80048 BASIC METABOLIC PNL TOTAL CA: CPT

## 2022-08-08 RX ORDER — HYDROMORPHONE HCL 110MG/55ML
0.5 PATIENT CONTROLLED ANALGESIA SYRINGE INTRAVENOUS EVERY 4 HOURS PRN
Status: DISCONTINUED | OUTPATIENT
Start: 2022-08-08 | End: 2022-08-10

## 2022-08-08 RX ADMIN — MORPHINE SULFATE 15 MG: 15 TABLET ORAL at 00:37

## 2022-08-08 RX ADMIN — HYDROMORPHONE HYDROCHLORIDE 0.5 MG: 2 INJECTION, SOLUTION INTRAMUSCULAR; INTRAVENOUS; SUBCUTANEOUS at 23:33

## 2022-08-08 RX ADMIN — SERTRALINE 25 MG: 50 TABLET, FILM COATED ORAL at 08:49

## 2022-08-08 RX ADMIN — SODIUM CHLORIDE: 9 INJECTION, SOLUTION INTRAVENOUS at 13:18

## 2022-08-08 RX ADMIN — Medication 1 G: at 19:34

## 2022-08-08 RX ADMIN — Medication 1 G: at 08:49

## 2022-08-08 RX ADMIN — MORPHINE SULFATE 15 MG: 15 TABLET ORAL at 14:45

## 2022-08-08 RX ADMIN — MORPHINE SULFATE 15 MG: 15 TABLET ORAL at 08:55

## 2022-08-08 RX ADMIN — PANTOPRAZOLE SODIUM 40 MG: 40 TABLET, DELAYED RELEASE ORAL at 04:51

## 2022-08-08 RX ADMIN — MORPHINE SULFATE 15 MG: 15 TABLET ORAL at 04:50

## 2022-08-08 RX ADMIN — ONDANSETRON 4 MG: 2 INJECTION INTRAMUSCULAR; INTRAVENOUS at 14:46

## 2022-08-08 RX ADMIN — ONDANSETRON 4 MG: 2 INJECTION INTRAMUSCULAR; INTRAVENOUS at 08:56

## 2022-08-08 RX ADMIN — MORPHINE SULFATE 15 MG: 15 TABLET ORAL at 19:34

## 2022-08-08 RX ADMIN — ATORVASTATIN CALCIUM 10 MG: 10 TABLET, FILM COATED ORAL at 08:50

## 2022-08-08 RX ADMIN — ONDANSETRON 4 MG: 2 INJECTION INTRAMUSCULAR; INTRAVENOUS at 20:17

## 2022-08-08 RX ADMIN — CEFTRIAXONE SODIUM 1000 MG: 1 INJECTION, POWDER, FOR SOLUTION INTRAMUSCULAR; INTRAVENOUS at 20:16

## 2022-08-08 ASSESSMENT — PAIN DESCRIPTION - LOCATION
LOCATION: ABDOMEN

## 2022-08-08 ASSESSMENT — PAIN SCALES - GENERAL
PAINLEVEL_OUTOF10: 10
PAINLEVEL_OUTOF10: 7
PAINLEVEL_OUTOF10: 3
PAINLEVEL_OUTOF10: 7
PAINLEVEL_OUTOF10: 7
PAINLEVEL_OUTOF10: 4
PAINLEVEL_OUTOF10: 10
PAINLEVEL_OUTOF10: 10
PAINLEVEL_OUTOF10: 5

## 2022-08-08 ASSESSMENT — PAIN DESCRIPTION - ORIENTATION
ORIENTATION: LOWER
ORIENTATION: ANTERIOR
ORIENTATION: ANTERIOR
ORIENTATION: RIGHT;LEFT

## 2022-08-08 ASSESSMENT — PAIN DESCRIPTION - DESCRIPTORS
DESCRIPTORS: CRAMPING
DESCRIPTORS: CRAMPING
DESCRIPTORS: ACHING
DESCRIPTORS: DISCOMFORT
DESCRIPTORS: DISCOMFORT

## 2022-08-08 NOTE — PROGRESS NOTES
Shift assessment completed. VSS. Pt c/o pain in the abdomen, pain medication was administered. De León is in place and draining properly. Pt denied any other needs at this time. Bed is in lowest position and call light is within reach. Will continue to monitor.

## 2022-08-08 NOTE — PROGRESS NOTES
Occupational Therapy  Attempted OT treatment session x2. Pt educated on importance of OOB mobility, prevention of complications of bedrest, and general safety during hospitalization. Pt verbalizing understanding. Pt reporting pain of 5/10 and fatigue level of 10/10 at rest.  Pt requesting to attempt therapy session tomorrow. Provided repositioning. Will re-attempt as schedule permits.     Thank you,    Ramana Bauer, OTR/L

## 2022-08-08 NOTE — PLAN OF CARE
Problem: Skin/Tissue Integrity  Goal: Absence of new skin breakdown  Description: 1. Monitor for areas of redness and/or skin breakdown  2. Assess vascular access sites hourly  3. Every 4-6 hours minimum:  Change oxygen saturation probe site  4. Every 4-6 hours:  If on nasal continuous positive airway pressure, respiratory therapy assess nares and determine need for appliance change or resting period. Outcome: Progressing     Humidification added to nasal cannula.

## 2022-08-08 NOTE — CARE COORDINATION
Chart reviewed day 3. Care managed per oncology and IM. Spoke with Kayleen Nash. Patient to get paracentesis today, malignant ascites. Last documentation of sats at 96% on 3LNC. Stated would like to see if can get humidification of O2 at home due to repeat nosebleeds. Will speak with Trey Mandujano. Stated patient just needs to call [facility] office to get humidified solution.  Vira Rhodes RN Never

## 2022-08-08 NOTE — PROGRESS NOTES
Hospitalist Progress Note      PCP: Stacey Cruz MD    Date of Admission: 8/5/2022    Chief Complaint: Abdominal pain    Hospital Course: Admitted with lower abdominal pain and intractable nausea and vomiting. Diagnosed with acute cystitis. Also noted to have ascites which is getting worse. Recently diagnosed with intra-abdominal malignancy of unknown primary but suspected ovarian tumor. Subjective: No chest pain, no shortness of breath, mild nausea, no vomiting. Subjectively slightly better today except that abdomen feels more distended and painful according to the patient. Medications:  Reviewed    Infusion Medications    sodium chloride      sodium chloride 75 mL/hr at 08/07/22 0932     Scheduled Medications    sodium chloride  1 g Oral BID WC    sertraline  25 mg Oral Daily    pantoprazole  40 mg Oral QAM AC    midodrine  10 mg Oral TID WC    megestrol  400 mg Oral Daily    atorvastatin  10 mg Oral Daily    fluticasone  1 spray Each Nostril Daily    aspirin  81 mg Oral Daily    [Held by provider] apixaban  5 mg Oral BID    cefTRIAXone (ROCEPHIN) IV  1,000 mg IntraVENous Q24H    sodium chloride flush  10 mL IntraVENous 2 times per day     PRN Meds: morphine, sodium chloride flush, sodium chloride, ondansetron, polyethylene glycol, acetaminophen **OR** acetaminophen      Intake/Output Summary (Last 24 hours) at 8/8/2022 1230  Last data filed at 8/8/2022 1140  Gross per 24 hour   Intake 909.08 ml   Output 500 ml   Net 409.08 ml       Physical Exam Performed:    /88   Pulse (!) 114   Temp 98 °F (36.7 °C)   Resp 16   Ht 5' 4\" (1.626 m)   Wt 190 lb (86.2 kg)   LMP  (LMP Unknown)   SpO2 96%   BMI 32.61 kg/m²     General appearance: No apparent distress, appears stated age and cooperative. HEENT: Pupils equal, round, and reactive to light. Conjunctivae/corneas clear. Neck: Supple, with full range of motion. No jugular venous distention. Trachea midline.   Respiratory:  Normal Ileus.         XR CHEST PORTABLE   Final Result   No acute abnormality allowing for patient rotation. US GUIDED PARACENTESIS    (Results Pending)           Assessment/Plan:    Active Hospital Problems    Diagnosis     Hypokalemia [E87.6]      Priority: Medium    Acute cystitis without hematuria [N30.00]      Priority: Medium    Personal history of DVT (deep vein thrombosis) [Z86.718]      Priority: Medium    Personal history of pulmonary embolism [Z86.711]      Priority: Medium    Intractable nausea and vomiting [R11.2]      Priority: Medium    Ovarian cancer ([address] [C56.9]      Priority: Medium    Generalized abdominal pain [R10.84]      Priority: Medium    Peritoneal carcinomatosis ([address] [C78.6]      Priority: Medium    Poor appetite [R63.0]      Priority: Medium    HTN (hypertension) [I10]     HLD (hyperlipidemia) [E78.5]      PLAN:    Progressive ascites  Suspected malignant but not confirmed. Paracentesis today. Paracentesis was considered before, but at that time patient did not have enough fluid in the abdomen to be removed. Fluid accumulation seems to be bigger today. Suspected ovarian cancer  Diagnosis made by imaging and CT-guided mesenteric biopsy in July. However, the exact primary has not yet been established. Clinical appearance is consistent with adnexal origin. Acute pulmonary embolism  Suspected hypercoagulability associated with malignancy. on Eliquis, which is currently on hold for planned paracentesis. UTI  Continue ceftriaxone for now. Discussed with the patient.   Questions answered    Discussed with nursing    DVT Prophylaxis: Eliquis on hold for procedure  Diet: Diet NPO  Code Status: Full Code    PT/OT Eval Status: Ordered    Dispo -inpatient stay pending further work-up    Colin Lomeli MD

## 2022-08-08 NOTE — ONCOLOGY
ONCOLOGY HEMATOLOGY CARE PROGRESS NOTE      SUBJECTIVE:     Afebrile and on 3 LPM by NC. Currently NPO. Tolerated clears yesterday. ROS:   The remaining 10 point review of symptoms is unremarkable. OBJECTIVE        Physical    VITALS:  /82   Pulse (!) 105   Temp 98.3 °F (36.8 °C) (Oral)   Resp 16   Ht 5' 4\" (1.626 m)   Wt 190 lb (86.2 kg)   LMP  (LMP Unknown)   SpO2 95%   BMI 32.61 kg/m²   TEMPERATURE:  Current - Temp: 98.3 °F (36.8 °C); Max - Temp  Av.3 °F (36.8 °C)  Min: 97.8 °F (36.6 °C)  Max: 98.7 °F (37.1 °C)  PULSE OXIMETRY RANGE: SpO2  Av.6 %  Min: 94 %  Max: 97 %  24HR INTAKE/OUTPUT:    Intake/Output Summary (Last 24 hours) at 2022 2398  Last data filed at 2022 0458  Gross per 24 hour   Intake 1011 ml   Output 550 ml   Net 461 ml         CONSTITUTIONAL:  awake, alert, cooperative, no apparent distress, HEENT oral pharynx , no scleral icterus  HEMATOLOGIC/LYMPHATICS:  no cervical lymphadenopathy, no supraclavicular lymphadenopathy, no axillary lymphadenopathy and no inguinal lymphadenopathy  LUNGS:  No increased work of breathing, good air exchange, clear to auscultation bilaterally, no crackles or wheezing  CARDIOVASCULAR:  , regular rate and rhythm, normal S1 and S2, no S3 or S4, and no murmur noted  ABDOMEN:  No scars, normal bowel sounds, soft, non-distended, non-tender, no masses palpated, no hepatosplenomegally  MUSCULOSKELETAL:  There is no redness, warmth, or swelling of the joints. EXTREMETIES: No clubbing cynosis or edema  NEUROLOGIC:  Awake, alert, oriented to name, place and time. Cranial nerves II-XII are grossly intact. Motor is 5 out of 5 bilaterally.    SKIN:  no bruising or bleeding      Data      Recent Labs     22  0600 22  0450 22  0500   WBC 4.3 5.4 7.8   HGB 9.2* 9.4* 9.2*   HCT 27.8* 28.0* 28.3*   * 130* 134*   MCV 84.2 83.6 84.1          Recent Labs     22  0600 22  0453 08/08/22  0500    137 135*   K 3.6 3.6 3.6    102 100   CO2 26 24 25   BUN 14 17 16   CREATININE <0.5* <0.5* <0.5*       Recent Labs     08/05/22  1924   AST 32   ALT 22   BILITOT 0.6   ALKPHOS 65         Magnesium:    Lab Results   Component Value Date/Time    MG 2.10 07/07/2022 09:28 AM         Problem List  Patient Active Problem List   Diagnosis    HTN (hypertension)    HLD (hyperlipidemia)    Low back pain    Benign neoplasm of colon    Swelling, mass, or lump in head and neck    Chronic sinusitis    BERNIE (generalized anxiety disorder)    Major depressive disorder with single episode, in full remission (HCC)    Generalized abdominal pain    Adnexal mass    Peritoneal carcinomatosis (HCC)    Malignant ascites    Poor appetite    Reactive depression    Acute pulmonary embolism (HCC)    Acute deep vein thrombosis (DVT) of proximal vein of both lower extremities (HCC)    Hyponatremia    Normocytic normochromic anemia    Ovarian cancer (HCC)    Intractable nausea and vomiting    Hypokalemia    Acute cystitis without hematuria    Personal history of DVT (deep vein thrombosis)    Personal history of pulmonary embolism       ASSESSMENT AND PLAN  1.) Probable ovarian cancer  - CA-125 19932 U/mL, 7/20/2022.  - CT abdomen/pelvis, 7/19/2022, showed abdominoperitoneal carcinomatosis with irregular/nodular ovaries bilaterally. Moderate ascites was present. - CT-guided mesenteric biopsy, 7/22/2022. 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 - US abdomen, 7/25/2022, showed too little ascites for paracentesis. - Left brachial vein PICC placed, 7/25/2022. - Carbo/Taxol #1 given on 7/26/2022. Due again on 8/16/2022. - A CT on 8/05/2022 showed improved peritoneal carcinomatosis at this early date.      2.) PE  - CTPA, 7/19/2022, showed bilateral PEs left greater than right.  - Duplex ultrasound of the BLE, 7/19/2022, showed SVT of right greater saphenous vein extending to 1.15 cm from SFJ. DVT of left distal femoral, popliteal, gastrocnemius, posterior tibial, peroneal veins.  - IVC filter placed, 7/20/2022, with Dr. Thelma Vivar.  - Treating with Eliquis, but it is on hold for a port later this week. 3.) UTI  - Ceftriaxone started, 8/10/2022     4.) Nausea/anorexia  - Megace  - Zofran  - Reportedly too little ascites for paracentesis on 7/25/2022. Can always revisit that on Monday.  - NPO --> clears on 8/07. NPO after MN last night.       ONCOLOGIC DISPOSITION:    Arron Mckay MD  Please contact through [address]

## 2022-08-09 ENCOUNTER — CARE COORDINATION (OUTPATIENT)
Dept: CASE MANAGEMENT | Age: 67
End: 2022-08-09

## 2022-08-09 ENCOUNTER — TELEPHONE (OUTPATIENT)
Dept: SURGERY | Age: 67
End: 2022-08-09

## 2022-08-09 LAB
A/G RATIO: 0.9 (ref 1.1–2.2)
ACANTHOCYTES: ABNORMAL
ALBUMIN SERPL-MCNC: 2.2 G/DL (ref 3.4–5)
ALP BLD-CCNC: 79 U/L (ref 40–129)
ALT SERPL-CCNC: 12 U/L (ref 10–40)
ANION GAP SERPL CALCULATED.3IONS-SCNC: 9 MMOL/L (ref 3–16)
AST SERPL-CCNC: 21 U/L (ref 15–37)
ATYPICAL LYMPHOCYTE RELATIVE PERCENT: 1 % (ref 0–6)
BANDED NEUTROPHILS RELATIVE PERCENT: 15 % (ref 0–7)
BASOPHILS ABSOLUTE: 0 K/UL (ref 0–0.2)
BASOPHILS RELATIVE PERCENT: 0 %
BILIRUB SERPL-MCNC: 0.6 MG/DL (ref 0–1)
BLOOD CULTURE, ROUTINE: NORMAL
BUN BLDV-MCNC: 14 MG/DL (ref 7–20)
CALCIUM SERPL-MCNC: 7.9 MG/DL (ref 8.3–10.6)
CHLORIDE BLD-SCNC: 102 MMOL/L (ref 99–110)
CO2: 23 MMOL/L (ref 21–32)
CREAT SERPL-MCNC: <0.5 MG/DL (ref 0.6–1.2)
CULTURE, BLOOD 2: NORMAL
EOSINOPHILS ABSOLUTE: 0 K/UL (ref 0–0.6)
EOSINOPHILS RELATIVE PERCENT: 0 %
GFR AFRICAN AMERICAN: >60
GFR NON-AFRICAN AMERICAN: >60
GLUCOSE BLD-MCNC: 115 MG/DL (ref 70–99)
HCT VFR BLD CALC: 30.5 % (ref 36–48)
HEMOGLOBIN: 10.2 G/DL (ref 12–16)
LYMPHOCYTES ABSOLUTE: 1.6 K/UL (ref 1–5.1)
LYMPHOCYTES RELATIVE PERCENT: 16 %
MCH RBC QN AUTO: 27.9 PG (ref 26–34)
MCHC RBC AUTO-ENTMCNC: 33.5 G/DL (ref 31–36)
MCV RBC AUTO: 83.2 FL (ref 80–100)
MICROCYTES: ABNORMAL
MONOCYTES ABSOLUTE: 1.1 K/UL (ref 0–1.3)
MONOCYTES RELATIVE PERCENT: 11 %
NEUTROPHILS ABSOLUTE: 6.9 K/UL (ref 1.7–7.7)
NEUTROPHILS RELATIVE PERCENT: 57 %
OVALOCYTES: ABNORMAL
PDW BLD-RTO: 14.2 % (ref 12.4–15.4)
PLATELET # BLD: 133 K/UL (ref 135–450)
PLATELET SLIDE REVIEW: ABNORMAL
PMV BLD AUTO: 8.5 FL (ref 5–10.5)
POLYCHROMASIA: ABNORMAL
POTASSIUM SERPL-SCNC: 3.7 MMOL/L (ref 3.5–5.1)
RBC # BLD: 3.67 M/UL (ref 4–5.2)
SLIDE REVIEW: ABNORMAL
SODIUM BLD-SCNC: 134 MMOL/L (ref 136–145)
TOTAL PROTEIN: 4.7 G/DL (ref 6.4–8.2)
WBC # BLD: 9.6 K/UL (ref 4–11)

## 2022-08-09 PROCEDURE — APPNB30 APP NON BILLABLE TIME 0-30 MINS: Performed by: CLINICAL NURSE SPECIALIST

## 2022-08-09 PROCEDURE — 6360000002 HC RX W HCPCS: Performed by: INTERNAL MEDICINE

## 2022-08-09 PROCEDURE — 80053 COMPREHEN METABOLIC PANEL: CPT

## 2022-08-09 PROCEDURE — 6370000000 HC RX 637 (ALT 250 FOR IP): Performed by: INTERNAL MEDICINE

## 2022-08-09 PROCEDURE — 1200000000 HC SEMI PRIVATE

## 2022-08-09 PROCEDURE — 94761 N-INVAS EAR/PLS OXIMETRY MLT: CPT

## 2022-08-09 PROCEDURE — 2580000003 HC RX 258: Performed by: INTERNAL MEDICINE

## 2022-08-09 PROCEDURE — 85025 COMPLETE CBC W/AUTO DIFF WBC: CPT

## 2022-08-09 PROCEDURE — 2700000000 HC OXYGEN THERAPY PER DAY

## 2022-08-09 RX ADMIN — ONDANSETRON 4 MG: 2 INJECTION INTRAMUSCULAR; INTRAVENOUS at 08:50

## 2022-08-09 RX ADMIN — MORPHINE SULFATE 15 MG: 15 TABLET ORAL at 08:50

## 2022-08-09 RX ADMIN — Medication 1 G: at 17:17

## 2022-08-09 RX ADMIN — CEFTRIAXONE SODIUM 1000 MG: 1 INJECTION, POWDER, FOR SOLUTION INTRAMUSCULAR; INTRAVENOUS at 21:45

## 2022-08-09 RX ADMIN — SODIUM CHLORIDE: 9 INJECTION, SOLUTION INTRAVENOUS at 14:28

## 2022-08-09 RX ADMIN — MORPHINE SULFATE 15 MG: 15 TABLET ORAL at 21:53

## 2022-08-09 RX ADMIN — SERTRALINE 25 MG: 50 TABLET, FILM COATED ORAL at 08:40

## 2022-08-09 RX ADMIN — APIXABAN 5 MG: 5 TABLET, FILM COATED ORAL at 21:53

## 2022-08-09 RX ADMIN — ATORVASTATIN CALCIUM 10 MG: 10 TABLET, FILM COATED ORAL at 08:40

## 2022-08-09 RX ADMIN — MIDODRINE HYDROCHLORIDE 10 MG: 5 TABLET ORAL at 08:40

## 2022-08-09 RX ADMIN — Medication 10 ML: at 21:53

## 2022-08-09 RX ADMIN — MIDODRINE HYDROCHLORIDE 10 MG: 5 TABLET ORAL at 17:17

## 2022-08-09 RX ADMIN — PANTOPRAZOLE SODIUM 40 MG: 40 TABLET, DELAYED RELEASE ORAL at 06:05

## 2022-08-09 RX ADMIN — MORPHINE SULFATE 15 MG: 15 TABLET ORAL at 14:36

## 2022-08-09 RX ADMIN — MORPHINE SULFATE 15 MG: 15 TABLET ORAL at 02:18

## 2022-08-09 RX ADMIN — SODIUM CHLORIDE, PRESERVATIVE FREE 10 ML: 5 INJECTION INTRAVENOUS at 21:53

## 2022-08-09 RX ADMIN — MIDODRINE HYDROCHLORIDE 10 MG: 5 TABLET ORAL at 11:24

## 2022-08-09 RX ADMIN — Medication 1 G: at 08:40

## 2022-08-09 RX ADMIN — ASPIRIN 81 MG: 81 TABLET, CHEWABLE ORAL at 08:40

## 2022-08-09 RX ADMIN — HYDROMORPHONE HYDROCHLORIDE 0.5 MG: 2 INJECTION, SOLUTION INTRAMUSCULAR; INTRAVENOUS; SUBCUTANEOUS at 06:04

## 2022-08-09 RX ADMIN — ONDANSETRON 4 MG: 2 INJECTION INTRAMUSCULAR; INTRAVENOUS at 21:53

## 2022-08-09 ASSESSMENT — PAIN DESCRIPTION - ORIENTATION
ORIENTATION: ANTERIOR
ORIENTATION: OTHER (COMMENT)

## 2022-08-09 ASSESSMENT — PAIN SCALES - GENERAL
PAINLEVEL_OUTOF10: 7
PAINLEVEL_OUTOF10: 4
PAINLEVEL_OUTOF10: 7
PAINLEVEL_OUTOF10: 7
PAINLEVEL_OUTOF10: 0
PAINLEVEL_OUTOF10: 7
PAINLEVEL_OUTOF10: 4
PAINLEVEL_OUTOF10: 5
PAINLEVEL_OUTOF10: 7
PAINLEVEL_OUTOF10: 8

## 2022-08-09 ASSESSMENT — PAIN DESCRIPTION - DESCRIPTORS
DESCRIPTORS: CRAMPING

## 2022-08-09 ASSESSMENT — PAIN DESCRIPTION - LOCATION
LOCATION: ABDOMEN

## 2022-08-09 ASSESSMENT — PAIN - FUNCTIONAL ASSESSMENT: PAIN_FUNCTIONAL_ASSESSMENT: PREVENTS OR INTERFERES SOME ACTIVE ACTIVITIES AND ADLS

## 2022-08-09 ASSESSMENT — PAIN DESCRIPTION - PAIN TYPE: TYPE: ACUTE PAIN

## 2022-08-09 ASSESSMENT — PAIN DESCRIPTION - FREQUENCY: FREQUENCY: CONTINUOUS

## 2022-08-09 NOTE — PLAN OF CARE
Problem: Skin/Tissue Integrity  Goal: Absence of new skin breakdown  Description: 1. Monitor for areas of redness and/or skin breakdown  2. Assess vascular access sites hourly  3. Every 4-6 hours minimum:  Change oxygen saturation probe site  4. Every 4-6 hours:  If on nasal continuous positive airway pressure, respiratory therapy assess nares and determine need for appliance change or resting period.   8/8/2022 2323 by Brea Martell RN  Outcome: Progressing     Problem: Pain  Goal: Verbalizes/displays adequate comfort level or baseline comfort level  Outcome: Progressing  Flowsheets (Taken 8/8/2022 2323)  Verbalizes/displays adequate comfort level or baseline comfort level:   Encourage patient to monitor pain and request assistance   Assess pain using appropriate pain scale     Problem: ABCDS Injury Assessment  Goal: Absence of physical injury  Outcome: Progressing  Flowsheets (Taken 8/8/2022 2323)  Absence of Physical Injury: Implement safety measures based on patient assessment     Problem: Safety - Adult  Goal: Free from fall injury  Outcome: Progressing     Problem: Respiratory - Adult  Goal: Achieves optimal ventilation and oxygenation  Outcome: Progressing  Flowsheets (Taken 8/8/2022 2323)  Achieves optimal ventilation and oxygenation: Assess for changes in respiratory status     Problem: Musculoskeletal - Adult  Goal: Return mobility to safest level of function  Outcome: Progressing  Flowsheets (Taken 8/8/2022 2323)  Return Mobility to Safest Level of Function: Assess patient stability and activity tolerance for standing, transferring and ambulating with or without assistive devices     Problem: Gastrointestinal - Adult  Goal: Minimal or absence of nausea and vomiting  Outcome: Progressing  Flowsheets (Taken 8/8/2022 2323)  Minimal or absence of nausea and vomiting: Administer IV fluids as ordered to ensure adequate hydration     Problem: Genitourinary - Adult  Goal: Urinary catheter remains patent  Outcome: Progressing  Flowsheets (Taken 8/8/2022 2323)  Urinary catheter remains patent: Assess patency of urinary catheter     Problem: Infection - Adult  Goal: Absence of infection at discharge  Outcome: Progressing  Flowsheets (Taken 8/8/2022 2323)  Absence of infection at discharge: Assess and monitor for signs and symptoms of infection     Problem: Metabolic/Fluid and Electrolytes - Adult  Goal: Electrolytes maintained within normal limits  Outcome: Progressing  Flowsheets (Taken 8/8/2022 2323)  Electrolytes maintained within normal limits: Monitor labs and assess patient for signs and symptoms of electrolyte imbalances     Problem: Hematologic - Adult  Goal: Maintains hematologic stability  Outcome: Progressing  Flowsheets (Taken 8/8/2022 2323)  Maintains hematologic stability: Monitor labs for bleeding or clotting disorders

## 2022-08-09 NOTE — FLOWSHEET NOTE
Pt assess completed. Pt still c/o level 10/10 abdominal cramping. Instructed pt to call nurse if oral pain meds she just received do not work. Poc discussed with pt for night and all questions answered.

## 2022-08-09 NOTE — CONSULTS
Consult placed    Who:Dr. Trung Mays  Date:8/9/2022,  Time:11:17 AM        Electronically signed by Pippa Leonardo on 8/9/2022 at 11:17 AM

## 2022-08-09 NOTE — TELEPHONE ENCOUNTER
Sent Genaro Price (sched dept) MS Teams mess to cancel pts Port Placement w/ Daniel tomorrow - Pt is currently admitted for an infection

## 2022-08-09 NOTE — ONCOLOGY
ONCOLOGY HEMATOLOGY CARE PROGRESS NOTE      SUBJECTIVE:     Afebrile and on 3 LPM by NC. Had a 6 L paracentesis. Feels better. ROS:   The remaining 10 point review of symptoms is unremarkable. OBJECTIVE        Physical    VITALS:  /74   Pulse (!) 110   Temp 98.1 °F (36.7 °C) (Oral)   Resp 18   Ht 5' 4\" (1.626 m)   Wt 190 lb (86.2 kg)   LMP  (LMP Unknown)   SpO2 95%   BMI 32.61 kg/m²   TEMPERATURE:  Current - Temp: 98.1 °F (36.7 °C); Max - Temp  Av.2 °F (36.8 °C)  Min: 97.7 °F (36.5 °C)  Max: 99.4 °F (37.4 °C)  PULSE OXIMETRY RANGE: SpO2  Av.8 %  Min: 95 %  Max: 96 %  24HR INTAKE/OUTPUT:    Intake/Output Summary (Last 24 hours) at 2022 6816  Last data filed at 2022 1034  Gross per 24 hour   Intake 2516.74 ml   Output 500 ml   Net 2016.74 ml         CONSTITUTIONAL:  awake, alert, cooperative, no apparent distress, HEENT oral pharynx , no scleral icterus  HEMATOLOGIC/LYMPHATICS:  no cervical lymphadenopathy, no supraclavicular lymphadenopathy, no axillary lymphadenopathy and no inguinal lymphadenopathy  LUNGS:  No increased work of breathing, good air exchange, clear to auscultation bilaterally, no crackles or wheezing  CARDIOVASCULAR:  , regular rate and rhythm, normal S1 and S2, no S3 or S4, and no murmur noted  ABDOMEN:  No scars, normal bowel sounds, soft, non-distended, non-tender, no masses palpated, no hepatosplenomegally  MUSCULOSKELETAL:  There is no redness, warmth, or swelling of the joints. EXTREMETIES: No clubbing cynosis or edema  NEUROLOGIC:  Awake, alert, oriented to name, place and time. Cranial nerves II-XII are grossly intact. Motor is 5 out of 5 bilaterally.    SKIN:  no bruising or bleeding      Data      Recent Labs     22  0450 22  0500 22  0610   WBC 5.4 7.8 9.6   HGB 9.4* 9.2* 10.2*   HCT 28.0* 28.3* 30.5*   * 134* 133*   MCV 83.6 84.1 83.2          Recent Labs     22  0450 22  0500 08/09/22  0610    135* 134*   K 3.6 3.6 3.7    100 102   CO2 24 25 23   BUN 17 16 14   CREATININE <0.5* <0.5* <0.5*       Recent Labs     08/09/22  0610   AST 21   ALT 12   BILITOT 0.6   ALKPHOS 79         Magnesium:    Lab Results   Component Value Date/Time    MG 2.10 07/07/2022 09:28 AM         Problem List  Patient Active Problem List   Diagnosis    HTN (hypertension)    HLD (hyperlipidemia)    Low back pain    Benign neoplasm of colon    Swelling, mass, or lump in head and neck    Chronic sinusitis    BERNIE (generalized anxiety disorder)    Major depressive disorder with single episode, in full remission (HCC)    Generalized abdominal pain    Adnexal mass    Peritoneal carcinomatosis (HCC)    Malignant ascites    Poor appetite    Reactive depression    Acute pulmonary embolism (HCC)    Acute deep vein thrombosis (DVT) of proximal vein of both lower extremities (HCC)    Hyponatremia    Normocytic normochromic anemia    Ovarian cancer (HCC)    Intractable nausea and vomiting    Hypokalemia    Acute cystitis without hematuria    Personal history of DVT (deep vein thrombosis)    Personal history of pulmonary embolism       ASSESSMENT AND PLAN  1.) Probable ovarian cancer  - CA-125 10549 U/mL, 7/20/2022.  - CT abdomen/pelvis, 7/19/2022, showed abdominoperitoneal carcinomatosis with irregular/nodular ovaries bilaterally. Moderate ascites was present. - CT-guided mesenteric biopsy, 7/22/2022. 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 - US abdomen, 7/25/2022, showed too little ascites for paracentesis. - Left brachial vein PICC placed, 7/25/2022. - Carbo/Taxol #1 given on 7/26/2022. Due again on 8/16/2022. - A CT on 8/05/2022 showed improved peritoneal carcinomatosis at this early date.      2.) PE  - CTPA, 7/19/2022, showed bilateral PEs left greater than right.  - Duplex ultrasound of the BLE, 7/19/2022, showed SVT of right greater saphenous

## 2022-08-09 NOTE — PROGRESS NOTES
[facility] SURGERY - [address]   8/9/2022 11:35 AM     Pt hospitalized with UTI, being treated with IV antibiotics. Discussed with Dr. Andriy Duron and will cancel port placement that was scheduled for tomorrow. Will need to reschedule once completed course of antibiotics. Discussed with pt's nurse.        Syed Knight, JENI - CNP

## 2022-08-09 NOTE — CONSULTS
Consult placed    Milla Mix  Date:8/9/2022,  Time:11:39 AM        Electronically signed by Osvaldo Henry on 8/9/2022 at 11:39 AM

## 2022-08-09 NOTE — CARE COORDINATION
Per chart review patient readmitted to [facility].      Johanna ROSEN, RN, [facility]  Care Transition Nurse  [phone] mobile

## 2022-08-09 NOTE — PROGRESS NOTES
Hospitalist Progress Note      PCP: Jaya Guzman MD    Date of Admission: 8/5/2022    Chief Complaint: Abdominal pain    Hospital Course: Admitted with lower abdominal pain and intractable nausea and vomiting. Diagnosed with acute cystitis. Also noted to have ascites which is getting worse. Recently diagnosed with intra-abdominal malignancy of unknown primary but suspected ovarian tumor. Patient had paracentesis which helped with abdominal pain. Still feels weak and remains edematous. Port-A-Cath that was previously scheduled for tomorrow is canceled. Subjective: No chest pain, no shortness of breath, mild nausea, no vomiting. Patient's abdominal pain is better after paracentesis      Medications:  Reviewed    Infusion Medications    sodium chloride      sodium chloride 75 mL/hr at 08/09/22 1128     Scheduled Medications    sodium chloride  1 g Oral BID WC    sertraline  25 mg Oral Daily    pantoprazole  40 mg Oral QAM AC    midodrine  10 mg Oral TID WC    megestrol  400 mg Oral Daily    atorvastatin  10 mg Oral Daily    fluticasone  1 spray Each Nostril Daily    aspirin  81 mg Oral Daily    apixaban  5 mg Oral BID    cefTRIAXone (ROCEPHIN) IV  1,000 mg IntraVENous Q24H    sodium chloride flush  10 mL IntraVENous 2 times per day     PRN Meds: HYDROmorphone, morphine, sodium chloride flush, sodium chloride, ondansetron, polyethylene glycol, acetaminophen **OR** acetaminophen      Intake/Output Summary (Last 24 hours) at 8/9/2022 1351  Last data filed at 8/9/2022 1128  Gross per 24 hour   Intake 2968.52 ml   Output 500 ml   Net 2468. 52 ml         Physical Exam Performed:    /75   Pulse (!) 103   Temp 98.1 °F (36.7 °C) (Oral)   Resp 16   Ht 5' 4\" (1.626 m)   Wt 190 lb (86.2 kg)   LMP  (LMP Unknown)   SpO2 95%   BMI 32.61 kg/m²     General appearance: No apparent distress, appears stated age and cooperative. HEENT: Pupils equal, round, and reactive to light.  Conjunctivae/corneas clear.  Neck: Supple, with full range of motion. No jugular venous distention. Trachea midline. Respiratory:  Normal respiratory effort. Clear to auscultation, bilaterally without Rales/Wheezes/Rhonchi. Cardiovascular: Regular rate and rhythm with normal S1/S2 without murmurs, rubs or gallops. Abdomen: Less distended compared to yesterday. Still somewhat tender but better. .  Musculoskeletal: No clubbing, cyanosis or edema bilaterally. Full range of motion without deformity. Skin: Skin color, texture, turgor normal.  No rashes or lesions. Neurologic:  Neurovascularly intact without any focal sensory/motor deficits. Cranial nerves: II-XII intact, grossly non-focal.  Psychiatric: Alert and oriented, thought content appropriate, normal insight  Capillary Refill: Brisk,3 seconds, normal   Peripheral Pulses: +2 palpable, equal bilaterally       Labs:   Recent Labs     08/07/22  0450 08/08/22  0500 08/09/22  0610   WBC 5.4 7.8 9.6   HGB 9.4* 9.2* 10.2*   HCT 28.0* 28.3* 30.5*   * 134* 133*       Recent Labs     08/07/22  0450 08/08/22  0500 08/09/22  0610    135* 134*   K 3.6 3.6 3.7    100 102   CO2 24 25 23   BUN 17 16 14   CREATININE <0.5* <0.5* <0.5*   CALCIUM 8.2* 8.8 7.9*       Recent Labs     08/09/22  0610   AST 21   ALT 12   BILITOT 0.6   ALKPHOS 79       Recent Labs     08/08/22  0950   INR 1.67*       No results for input(s): CKTOTAL, TROPONINI in the last 72 hours. Urinalysis:      Lab Results   Component Value Date/Time    NITRU POSITIVE 08/05/2022 07:45 PM    WBCUA 0-2 08/05/2022 07:45 PM    BACTERIA 2+ 08/05/2022 07:45 PM    RBCUA 0-2 08/05/2022 07:45 PM    BLOODU Negative 08/05/2022 07:45 PM    SPECGRAV 1.025 08/05/2022 07:45 PM    GLUCOSEU Negative 08/05/2022 07:45 PM       Radiology:  US GUIDED PARACENTESIS   Final Result   Status post successful ultrasound-guided therapeutic paracentesis.          CT ABDOMEN PELVIS W IV CONTRAST Additional Contrast? None   Final Result   Right lower lobe pulmonary emboli unchanged. Increased left lower lobe pulmonary consolidation. Moderate left effusion   unchanged. Moderate ascites unchanged. Peritoneal carcinomatosis appears somewhat   improved. Ileus. XR CHEST PORTABLE   Final Result   No acute abnormality allowing for patient rotation. Assessment/Plan:    Active Hospital Problems    Diagnosis     Hypokalemia [E87.6]      Priority: Medium    Acute cystitis without hematuria [N30.00]      Priority: Medium    Personal history of DVT (deep vein thrombosis) [Z86.718]      Priority: Medium    Personal history of pulmonary embolism [Z86.711]      Priority: Medium    Intractable nausea and vomiting [R11.2]      Priority: Medium    Ovarian cancer ([facility] [address] [C56.9]      Priority: Medium    Generalized abdominal pain [R10.84]      Priority: Medium    Peritoneal carcinomatosis ([facility] [address] [C78.6]      Priority: Medium    Poor appetite [R63.0]      Priority: Medium    HTN (hypertension) [I10]     HLD (hyperlipidemia) [E78.5]      PLAN:    Progressive ascites  Suspected malignant but not confirmed. Had paracentesis. Abdominal pain is slightly better. Suspected ovarian cancer  Diagnosis made by imaging and CT-guided mesenteric biopsy in July. However, the exact primary has not yet been established. Clinical appearance is consistent with adnexal origin. Being followed by oncology. Acute pulmonary embolism  Eliquis resumed given paracentesis done and Port-A-Cath not planned yet. UTI  Continue ceftriaxone for now. 5-day course prescribed      Discussed with the patient. Questions answered    Discussed with nursing    DVT Prophylaxis: Eliquis   Diet: ADULT DIET; Clear Liquid  ADULT ORAL NUTRITION SUPPLEMENT; Breakfast, Dinner; Standard High Calorie/High Protein Oral Supplement  Code Status: Full Code    PT/OT Eval Status: Ordered    Dispo -inpatient stay pending PT/OT recommendation.   Palliative care also consulted   BABITA Raad Jenkins MD

## 2022-08-10 ENCOUNTER — APPOINTMENT (OUTPATIENT)
Dept: GENERAL RADIOLOGY | Age: 67
DRG: 754 | End: 2022-08-10
Payer: MEDICARE

## 2022-08-10 VITALS
BODY MASS INDEX: 32.44 KG/M2 | TEMPERATURE: 97.8 F | SYSTOLIC BLOOD PRESSURE: 67 MMHG | WEIGHT: 190 LBS | HEIGHT: 64 IN | OXYGEN SATURATION: 98 % | DIASTOLIC BLOOD PRESSURE: 56 MMHG

## 2022-08-10 PROBLEM — J96.01 ACUTE RESPIRATORY FAILURE WITH HYPOXEMIA (HCC): Status: ACTIVE | Noted: 2022-08-10

## 2022-08-10 PROBLEM — R06.02 SOB (SHORTNESS OF BREATH): Status: ACTIVE | Noted: 2022-08-10

## 2022-08-10 PROBLEM — I51.89 RIGHT VENTRICULAR SYSTOLIC DYSFUNCTION: Status: ACTIVE | Noted: 2022-08-10

## 2022-08-10 PROBLEM — D69.6 THROMBOCYTOPENIA (HCC): Status: ACTIVE | Noted: 2022-08-10

## 2022-08-10 PROBLEM — E87.20 LACTIC ACIDOSIS: Status: ACTIVE | Noted: 2022-08-10

## 2022-08-10 PROBLEM — R57.9 SHOCK CIRCULATORY (HCC): Status: ACTIVE | Noted: 2022-08-10

## 2022-08-10 PROBLEM — I27.20 PULMONARY HTN (HCC): Status: ACTIVE | Noted: 2022-08-10

## 2022-08-10 PROBLEM — E79.0 HYPERURICEMIA: Status: ACTIVE | Noted: 2022-08-10

## 2022-08-10 PROBLEM — E43 SEVERE PROTEIN-CALORIE MALNUTRITION (HCC): Status: ACTIVE | Noted: 2022-08-10

## 2022-08-10 LAB
A/G RATIO: 0.8 (ref 1.1–2.2)
ALBUMIN SERPL-MCNC: 2.1 G/DL (ref 3.4–5)
ALBUMIN SERPL-MCNC: 2.2 G/DL (ref 3.4–5)
ALP BLD-CCNC: 71 U/L (ref 40–129)
ALT SERPL-CCNC: 10 U/L (ref 10–40)
ANION GAP SERPL CALCULATED.3IONS-SCNC: 19 MMOL/L (ref 3–16)
ANION GAP SERPL CALCULATED.3IONS-SCNC: 9 MMOL/L (ref 3–16)
AST SERPL-CCNC: 18 U/L (ref 15–37)
BANDED NEUTROPHILS RELATIVE PERCENT: 21 % (ref 0–7)
BASOPHILS ABSOLUTE: 0 K/UL (ref 0–0.2)
BASOPHILS ABSOLUTE: 0 K/UL (ref 0–0.2)
BASOPHILS RELATIVE PERCENT: 0 %
BASOPHILS RELATIVE PERCENT: 0.1 %
BILIRUB SERPL-MCNC: 0.5 MG/DL (ref 0–1)
BUN BLDV-MCNC: 17 MG/DL (ref 7–20)
BUN BLDV-MCNC: 22 MG/DL (ref 7–20)
CALCIUM SERPL-MCNC: 7.9 MG/DL (ref 8.3–10.6)
CALCIUM SERPL-MCNC: 7.9 MG/DL (ref 8.3–10.6)
CHLORIDE BLD-SCNC: 104 MMOL/L (ref 99–110)
CHLORIDE BLD-SCNC: 106 MMOL/L (ref 99–110)
CO2: 11 MMOL/L (ref 21–32)
CO2: 23 MMOL/L (ref 21–32)
CREAT SERPL-MCNC: 0.9 MG/DL (ref 0.6–1.2)
CREAT SERPL-MCNC: <0.5 MG/DL (ref 0.6–1.2)
EOSINOPHILS ABSOLUTE: 0 K/UL (ref 0–0.6)
EOSINOPHILS ABSOLUTE: 0 K/UL (ref 0–0.6)
EOSINOPHILS RELATIVE PERCENT: 0 %
EOSINOPHILS RELATIVE PERCENT: 0 %
GFR AFRICAN AMERICAN: >60
GFR AFRICAN AMERICAN: >60
GFR NON-AFRICAN AMERICAN: >60
GFR NON-AFRICAN AMERICAN: >60
GLUCOSE BLD-MCNC: 110 MG/DL (ref 70–99)
GLUCOSE BLD-MCNC: 112 MG/DL (ref 70–99)
GLUCOSE BLD-MCNC: 181 MG/DL (ref 70–99)
HCT VFR BLD CALC: 30.5 % (ref 36–48)
HCT VFR BLD CALC: 41 % (ref 36–48)
HEMOGLOBIN: 10.1 G/DL (ref 12–16)
HEMOGLOBIN: 12.9 G/DL (ref 12–16)
LACTIC ACID: 9.9 MMOL/L (ref 0.4–2)
LYMPHOCYTES ABSOLUTE: 0.6 K/UL (ref 1–5.1)
LYMPHOCYTES ABSOLUTE: 2.3 K/UL (ref 1–5.1)
LYMPHOCYTES RELATIVE PERCENT: 10.5 %
LYMPHOCYTES RELATIVE PERCENT: 6 %
MACROCYTES: ABNORMAL
MCH RBC QN AUTO: 26.9 PG (ref 26–34)
MCH RBC QN AUTO: 27.8 PG (ref 26–34)
MCHC RBC AUTO-ENTMCNC: 31.5 G/DL (ref 31–36)
MCHC RBC AUTO-ENTMCNC: 33.3 G/DL (ref 31–36)
MCV RBC AUTO: 83.7 FL (ref 80–100)
MCV RBC AUTO: 85.4 FL (ref 80–100)
MICROCYTES: ABNORMAL
MONOCYTES ABSOLUTE: 0.5 K/UL (ref 0–1.3)
MONOCYTES ABSOLUTE: 1.2 K/UL (ref 0–1.3)
MONOCYTES RELATIVE PERCENT: 12 %
MONOCYTES RELATIVE PERCENT: 2.3 %
NEUTROPHILS ABSOLUTE: 19.5 K/UL (ref 1.7–7.7)
NEUTROPHILS ABSOLUTE: 8.5 K/UL (ref 1.7–7.7)
NEUTROPHILS RELATIVE PERCENT: 61 %
NEUTROPHILS RELATIVE PERCENT: 87.1 %
PDW BLD-RTO: 14.6 % (ref 12.4–15.4)
PDW BLD-RTO: 14.7 % (ref 12.4–15.4)
PERFORMED ON: ABNORMAL
PHOSPHORUS: 5.4 MG/DL (ref 2.5–4.9)
PLATELET # BLD: 100 K/UL (ref 135–450)
PLATELET # BLD: 234 K/UL (ref 135–450)
PLATELET SLIDE REVIEW: ABNORMAL
PMV BLD AUTO: 8.2 FL (ref 5–10.5)
PMV BLD AUTO: 9 FL (ref 5–10.5)
POLYCHROMASIA: ABNORMAL
POTASSIUM SERPL-SCNC: 3.7 MMOL/L (ref 3.5–5.1)
POTASSIUM SERPL-SCNC: 5 MMOL/L (ref 3.5–5.1)
RBC # BLD: 3.64 M/UL (ref 4–5.2)
RBC # BLD: 4.8 M/UL (ref 4–5.2)
SLIDE REVIEW: ABNORMAL
SODIUM BLD-SCNC: 136 MMOL/L (ref 136–145)
SODIUM BLD-SCNC: 136 MMOL/L (ref 136–145)
SPHEROCYTES: ABNORMAL
TOTAL PROTEIN: 4.8 G/DL (ref 6.4–8.2)
WBC # BLD: 10.4 K/UL (ref 4–11)
WBC # BLD: 22.3 K/UL (ref 4–11)

## 2022-08-10 PROCEDURE — 97535 SELF CARE MNGMENT TRAINING: CPT

## 2022-08-10 PROCEDURE — 6370000000 HC RX 637 (ALT 250 FOR IP): Performed by: INTERNAL MEDICINE

## 2022-08-10 PROCEDURE — 2580000003 HC RX 258: Performed by: INTERNAL MEDICINE

## 2022-08-10 PROCEDURE — 6360000002 HC RX W HCPCS: Performed by: INTERNAL MEDICINE

## 2022-08-10 PROCEDURE — 83605 ASSAY OF LACTIC ACID: CPT

## 2022-08-10 PROCEDURE — 99291 CRITICAL CARE FIRST HOUR: CPT | Performed by: INTERNAL MEDICINE

## 2022-08-10 PROCEDURE — 2500000003 HC RX 250 WO HCPCS: Performed by: INTERNAL MEDICINE

## 2022-08-10 PROCEDURE — 2700000000 HC OXYGEN THERAPY PER DAY

## 2022-08-10 PROCEDURE — 85025 COMPLETE CBC W/AUTO DIFF WBC: CPT

## 2022-08-10 PROCEDURE — 6360000002 HC RX W HCPCS: Performed by: NURSE PRACTITIONER

## 2022-08-10 PROCEDURE — 97530 THERAPEUTIC ACTIVITIES: CPT

## 2022-08-10 PROCEDURE — 93005 ELECTROCARDIOGRAM TRACING: CPT | Performed by: INTERNAL MEDICINE

## 2022-08-10 PROCEDURE — 74018 RADEX ABDOMEN 1 VIEW: CPT

## 2022-08-10 PROCEDURE — 36415 COLL VENOUS BLD VENIPUNCTURE: CPT

## 2022-08-10 PROCEDURE — 97110 THERAPEUTIC EXERCISES: CPT

## 2022-08-10 PROCEDURE — 80053 COMPREHEN METABOLIC PANEL: CPT

## 2022-08-10 PROCEDURE — 94761 N-INVAS EAR/PLS OXIMETRY MLT: CPT

## 2022-08-10 RX ORDER — ONDANSETRON 2 MG/ML
4 INJECTION INTRAMUSCULAR; INTRAVENOUS EVERY 6 HOURS PRN
Status: DISCONTINUED | OUTPATIENT
Start: 2022-08-10 | End: 2022-08-10 | Stop reason: HOSPADM

## 2022-08-10 RX ORDER — HYDROMORPHONE HCL 110MG/55ML
1 PATIENT CONTROLLED ANALGESIA SYRINGE INTRAVENOUS
Status: DISCONTINUED | OUTPATIENT
Start: 2022-08-10 | End: 2022-08-10

## 2022-08-10 RX ORDER — MORPHINE SULFATE 20 MG/ML
5 SOLUTION ORAL
Status: DISCONTINUED | OUTPATIENT
Start: 2022-08-10 | End: 2022-08-10

## 2022-08-10 RX ORDER — LORAZEPAM 2 MG/ML
1 INJECTION INTRAMUSCULAR EVERY 6 HOURS PRN
Status: DISCONTINUED | OUTPATIENT
Start: 2022-08-10 | End: 2022-08-10

## 2022-08-10 RX ORDER — HYDROMORPHONE HCL 110MG/55ML
1 PATIENT CONTROLLED ANALGESIA SYRINGE INTRAVENOUS
Status: DISCONTINUED | OUTPATIENT
Start: 2022-08-10 | End: 2022-08-10 | Stop reason: HOSPADM

## 2022-08-10 RX ORDER — HYDROMORPHONE HCL 110MG/55ML
0.25 PATIENT CONTROLLED ANALGESIA SYRINGE INTRAVENOUS ONCE
Status: COMPLETED | OUTPATIENT
Start: 2022-08-10 | End: 2022-08-10

## 2022-08-10 RX ORDER — MIDAZOLAM HYDROCHLORIDE 1 MG/ML
0.5 INJECTION INTRAMUSCULAR; INTRAVENOUS EVERY 30 MIN PRN
Status: DISCONTINUED | OUTPATIENT
Start: 2022-08-10 | End: 2022-08-10 | Stop reason: HOSPADM

## 2022-08-10 RX ADMIN — SODIUM CHLORIDE, PRESERVATIVE FREE 10 ML: 5 INJECTION INTRAVENOUS at 09:23

## 2022-08-10 RX ADMIN — MORPHINE SULFATE 15 MG: 15 TABLET ORAL at 10:20

## 2022-08-10 RX ADMIN — HYDROMORPHONE HYDROCHLORIDE 1 MG: 2 INJECTION, SOLUTION INTRAMUSCULAR; INTRAVENOUS; SUBCUTANEOUS at 14:19

## 2022-08-10 RX ADMIN — SERTRALINE 25 MG: 50 TABLET, FILM COATED ORAL at 10:16

## 2022-08-10 RX ADMIN — APIXABAN 5 MG: 5 TABLET, FILM COATED ORAL at 10:16

## 2022-08-10 RX ADMIN — ONDANSETRON 4 MG: 2 INJECTION INTRAMUSCULAR; INTRAVENOUS at 02:33

## 2022-08-10 RX ADMIN — SODIUM CHLORIDE: 9 INJECTION, SOLUTION INTRAVENOUS at 07:09

## 2022-08-10 RX ADMIN — ONDANSETRON 4 MG: 2 INJECTION INTRAMUSCULAR; INTRAVENOUS at 09:21

## 2022-08-10 RX ADMIN — MIDAZOLAM 0.5 MG: 1 INJECTION INTRAMUSCULAR; INTRAVENOUS at 14:26

## 2022-08-10 RX ADMIN — MIDODRINE HYDROCHLORIDE 10 MG: 5 TABLET ORAL at 12:28

## 2022-08-10 RX ADMIN — HYDROMORPHONE HYDROCHLORIDE 0.5 MG: 2 INJECTION, SOLUTION INTRAMUSCULAR; INTRAVENOUS; SUBCUTANEOUS at 09:20

## 2022-08-10 RX ADMIN — HYDROMORPHONE HYDROCHLORIDE 0.5 MG: 2 INJECTION, SOLUTION INTRAMUSCULAR; INTRAVENOUS; SUBCUTANEOUS at 02:34

## 2022-08-10 RX ADMIN — HYDROMORPHONE HYDROCHLORIDE 0.25 MG: 2 INJECTION, SOLUTION INTRAMUSCULAR; INTRAVENOUS; SUBCUTANEOUS at 13:24

## 2022-08-10 RX ADMIN — ASPIRIN 81 MG: 81 TABLET, CHEWABLE ORAL at 10:17

## 2022-08-10 RX ADMIN — PANTOPRAZOLE SODIUM 40 MG: 40 TABLET, DELAYED RELEASE ORAL at 06:25

## 2022-08-10 RX ADMIN — Medication 1 G: at 10:17

## 2022-08-10 RX ADMIN — ATORVASTATIN CALCIUM 10 MG: 10 TABLET, FILM COATED ORAL at 10:16

## 2022-08-10 RX ADMIN — DEXTROSE MONOHYDRATE 5 MCG/MIN: 50 INJECTION, SOLUTION INTRAVENOUS at 13:22

## 2022-08-10 ASSESSMENT — PAIN SCALES - GENERAL
PAINLEVEL_OUTOF10: 10
PAINLEVEL_OUTOF10: 8
PAINLEVEL_OUTOF10: 3
PAINLEVEL_OUTOF10: 9
PAINLEVEL_OUTOF10: 1

## 2022-08-10 ASSESSMENT — PAIN DESCRIPTION - LOCATION: LOCATION: ABDOMEN

## 2022-08-10 ASSESSMENT — PAIN - FUNCTIONAL ASSESSMENT: PAIN_FUNCTIONAL_ASSESSMENT: PREVENTS OR INTERFERES SOME ACTIVE ACTIVITIES AND ADLS

## 2022-08-10 ASSESSMENT — PAIN DESCRIPTION - DESCRIPTORS: DESCRIPTORS: CRAMPING

## 2022-08-10 NOTE — CONSULTS
PALLIATIVE MEDICINE CONSULTATION     Patient name:Sunshine Yanez   XY    :1955  Room/Bed:0331/0331-01   LOS: 5 days         Date of consult:8/10/2022    Consult Information  Palliative Medicine Consult performed by: JENI Strauss CNP      Inpatient consult to Palliative Care  Consult performed by: JENI Parry CNP  Consult ordered by: Bharti Davidson MD      Reason for consult: goals of care, code status discussion  Number of admissions past 12 months: 1      ASSESSMENT/RECOMMENDATIONS     79 y.o. female with suspected ovarian cancer and severe abdominal pain. Symptom Management:  Goals of Care - Patient wants to continue with aggressive medical treatment for her cancer. Has many questions about her cancer and the treatment plan, but overall wants to keep going. Code status is full code. See 1200 addendum added under goals of care  Abdominal pain - likely due to her carcinomatosis and ascites. Feels like her gas pain is a little worse today than yesterday. Has PRN dilaudid and morphine. Wants to get up and work with PT but feels like there is too much abdominal pain to do so. Debility - due to abdominal pain, ascites and shortness of breath. Has progressively worsened over the past 3 weeks, feels like chemo might have made it worse. Is normally very active. No appetite and has not really been able to eat anything for the past few days. The ascites makes her feel short of breath and unable to do much. Albumin 2.2, total protein 4.8. No falls. Will be going to a SNF following hospital discharge for strengthening. Patient/Family Goals of Care :    8/10/22    Met with patient at the bedside. She is alert and oriented and decisional, but she appears to be in pain and very uncomfortable. She says she understands she has cancer, but she does not know for sure what kind of caner she has.   She says she has not been given a plan yet by oncology other than chemo on Monday. She was supposed to get a port today but it was cancelled because of her kidney infection. She did miserable with her first chemo treatment, but she is willing to keep trying. She asks her friend Naila Wesley and brother Daniel Garcia who are in the room with her to please talk to me further because she is in too much pain to do it. They are able to tell me that patient was a  for [employer] and retired in 2016. Since then she spends her time traveling to [place] and spending time with her niece's two young daughters. \"She lives for those little girls\". She is very active usually. The past 3 weeks have been very hard for her as she has been in so much pain and so disabled with weakness and SOB. Daniel Garcia feels like she the chemo knocked her down. She didn't want to go to a SNF at first but now she is willing to go for however long is needed. Patient's niece Taj Burt is an RN and is her HCPOA. Her brother Daniel Garcia is her surrogate #2. The HCPOA and living will were provided to the RN at the beginning of this week to scan to the chart (will try to locate). She has a lot of family support at home. She lives alone. She did have one episode of HHC, but it was brief and they did not feel like the person really helped her and they would like her to have better help when she returns home. They are concerned about her continued abdominal pain this week. They are worried that it has gotten worse since the paracentesis. Daniel Garcia would like to know the plan with her cancer. They have not heard from the doctors yet and want to know what is going on. We did talk about the suspected ovarian cancer. They were told this was not a final diagnosis. Daniel Garcia states that whatever they tell her for a diagnosis \"it will not be a terminal diagnosis\". He says his sister is tough and will not die if he has anything to do with it. We discussed code status to which patient stated she was a \"DNR\".   Discussed further about what that meant and patient and her brother stated she would not want to be on life support for a long time. She would not want a trach or a peg. She could live in a nursing home as long as she could still interact with her niece's 2 daughters. Patient states that if she is given a terminal diagnosis she will change her code status to a DNR. I asked if she would continue treatment if she was given a terminal diagnosis and she said she is not sure. She wants to see how she does with this chemo. Brother would be open to home palliative care following patient once she gets out of the SNF.    1200 Addendum:  rapid response called for hypotension. Patient also had severe abdominal bloating. At one point had LOC. POA #2 at the bedside trying to reach POA #1 by phone but could not get her. Attending MD and ICU NP had discussion with POA #2 and patient's friend about current medical status. He informed them she had stage IV cancer and was concerned about her having any meaningful benefit from resuscitation given that status. POA and friend were accepting of this information but sad. As patient's status continued to decline, the POA was asked to make a decision about how we need to proceed (ICU vs comfort care). While Snuita Tee was deciding what to do, an NG was placed in patient to attempt decompression. POA then advised that he wanted to elect comfort care because he did not want patient to suffer. Code status was then changed to DNR and comfort orders were placed. I then talked with POA #1 Eliezer Marquez (who was now finally able to be reached by patient's friend) on the phone and explained what was going on. Shantelle Davis was very emotional and was trying to get to the hospital as soon as possible saying she wanted to see patient before she . She asked us not to give any pain medication until she could get here.   POA #2 also asking me and stating to Shantelle Davis that no pain medicine would be given until Shantelle Davis got here and \"we aren't doing anything more until Roula Claros gets here\". I explained to [facility] that if patient is in pain we cannot withhold pain medicine from her. He stated she did not appear to be in any pain to him. Several minutes after my phone call with Roula Claros, patient regained consciousness and stated to staff she wanted \"everything done\". She stated she wanted to go to the ICU and was okay with intubation. She was calm and appeared decisional to me telling me directly this was scary and she wanted \"everything done\" to help her breathe and get her out of pain, including intubation. She does not want to be a DNR right now. I informed her that she might not have any meaningful recovery and could suffer additional medical issues from the low BP and treatments and procedures with resuscitation. She told me she understands that possibility and wants to go to the ICU and \"do everything you can for me\". Attending MD was called back to the room where patient repeated her wishes to him. Code status was changed to full code and patient was transferred to the ICU. Disposition/Discharge Plan:   - pending medical course  - An outpatient [facility] referral may be appropriate for post-discharge follow up when patient returns home    Advance Directives:  Surrogate Decision Maker: HCPOA #1 Fara Leos, #2 Sherlyn Given  Code status:  Full Code      Palliative Performance Scale:     [] 60%  Amb reduced; Sig dz. Can't do hobbies/housework; Intake normal or reduced, Occasional assist; LOC full/confusion   [] 50%  Mainly sit/lie; Extensive disease. Mainly assist, Intake normal or reduced; Occasional assist; LOC full/confusion   [] 40%  Mainly in bed; Extensive disease; Mainly assist; Intake normal or reduced; Occasional assist; LOC full/confusion   [x] 30%  Bed bound, Extensive disease; Total care; Intake reduced; LOC full/confusion   [] 20%  Bed bound; Extensive disease; Total care;  Intake minimal; Drowsy/coma   [] 10% Bed bound; Extensive disease; Total care; Mouth care only; Drowsy/coma   []  0%   Death      Case discussed with: patient, family, floor RN  Thank you for allowing us to participate in the care of this patient. HISTORY     CC: nausea  HPI: The patient is a 79 y.o. female with a history of hypertension, hyperlipidemia, ovarian cancer with peritoneal carcinomatosis and has a h/o DVT and PE for which she takes Eliquis. She presents to the emergency room for evaluation following a 1 week history of loss of appetite, and recurrent episodes of nausea, vomiting and ongoing abdominal pain. She does report having a bowel movement earlier today. Her last chemotherapy was while she was in the hospital before being discharged. In the emergency room she was found to have a urinary tract infection, stable ascites, and ileus. She is being admitted for further evaluation and treatment. Associated signs and symptoms do not include fever or chills, hemoptysis, hematochezia, diarrhea, constipation or urinary symptoms. Palliative Medicine SymptomScreening/ROS:    Review of Systems   Unable to perform ROS: Other (states she is in too much abdominal pain to talk)      A complete 10 count ROS was obtained. Pertinent positives mentioned above in HPI/ROS. All others if not mentioned are negative. Home med list and hospital medications reviewed in chart as of 8/10/2022     EXAM     Vitals:    08/10/22 0914   BP: 109/76   Pulse: 100   Resp: 16   Temp: 97.8 °F (36.6 °C)   SpO2: 98%       Physical Examination:   Physical Exam  Vitals reviewed. Constitutional:       General: She is not in acute distress. Appearance: She is ill-appearing. Comments: Lying in bed, moaning. Appears very uncomfortable. RN just medicated her   HENT:      Head: Normocephalic and atraumatic. Nose: Nose normal. No congestion or rhinorrhea. Mouth/Throat:      Mouth: Mucous membranes are dry.       Pharynx: No oropharyngeal exudate or posterior oropharyngeal erythema. Eyes:      General: No scleral icterus. Right eye: No discharge. Left eye: No discharge. Extraocular Movements: Extraocular movements intact. Conjunctiva/sclera: Conjunctivae normal.      Pupils: Pupils are equal, round, and reactive to light. Cardiovascular:      Rate and Rhythm: Tachycardia present. Pulses: Normal pulses. Pulmonary:      Effort: Pulmonary effort is normal. No respiratory distress. Breath sounds: No wheezing. Comments: NC  Abdominal:      General: There is distension. Tenderness: There is abdominal tenderness. There is guarding. Musculoskeletal:         General: Normal range of motion. Cervical back: Normal range of motion and neck supple. Right lower leg: Edema present. Left lower leg: Edema present. Skin:     General: Skin is warm and dry. Capillary Refill: Capillary refill takes less than 2 seconds. Neurological:      Mental Status: She is alert and oriented to person, place, and time. Mental status is at baseline. Psychiatric:         Mood and Affect: Mood normal.         Behavior: Behavior normal.         Thought Content:  Thought content normal.         Judgment: Judgment normal.           Current labs in the epic chart reviewed as of 8/10/2022   Review of previous notes, admits, labs, radiology and testing relevant to this consult done in this chart today 8/10/2022      Total time: 250 minutes  >50% of time spent counseling patient at bedside or POA/family member if applicable , reviewing information and discussing care, coordinating with care team  Signed By: Electronically signed by JENI Chauhan CNP on 8/10/2022 at 10:00 AM  Palliative Medicine   [phone]    August 10, 2022

## 2022-08-10 NOTE — PROGRESS NOTES
Occupational Therapy  Facility/Department: [facility] C2 CARD TELEMETRY  Occupational Therapy Daily Treatment Note    Name: Hermelinda Cruz  : 1955  MRN: [id number]  Date of Service: 8/10/2022    Discharge Recommendations:  [address]      Patient Diagnosis(es): The primary encounter diagnosis was Urinary tract infection without hematuria, site unspecified. Diagnoses of Abdominal pain with vomiting, Ileus ([facility] [address] Malignant ascites, and Pleural effusion on left were also pertinent to this visit. Past Medical History:  has a past medical history of Anxiety, Ascites, Cerebral artery occlusion with cerebral infarction [facility]), DVT (deep venous thrombosis) ([facility] [address] Hx of blood clots, Hyperlipidemia, Hypertension, Ovarian ca ([facility] [address] and Pulmonary embolism ([facility] [address] Past Surgical History:  has a past surgical history that includes Colonoscopy (2014); CT BIOPSY ABDOMEN RETROPERITONEUM (2022); and IVC filter insertion (2022). Assessment   Performance deficits / Impairments: Decreased functional mobility ; Decreased strength;Decreased endurance;Decreased coordination;Decreased ADL status; Decreased safe awareness;Decreased balance  Assessment: Pt is functioning below baseline for ADLs and mobility. She required mod /max assist x2 for functional mobility with increased time and education on safe technique. Extensive assist needed for UE/LE ADLs. Pt was agreeable to sit in chair at end of session. Vitals were monitored. Cont OT tx. OT now recommends SNF for skilled OT to improve function. Completed co-tx with PT to safely address ADLs and mobility d/t extent of performance deficits.     Prognosis: Fair  REQUIRES OT FOLLOW-UP: No  Activity Tolerance  Activity Tolerance: Patient limited by fatigue;Patient limited by pain        Plan   Plan  Times per Week: 3-5  Current Treatment Recommendations: Strengthening, Balance training, Functional mobility training, Endurance training, Safety education & training, Equipment evaluation, education, & procurement, Patient/Caregiver education & training, Self-Care / ADL     Restrictions  Restrictions/Precautions  Restrictions/Precautions: Modified Diet, Fall Risk  Position Activity Restriction  Other position/activity restrictions: up with assist, shrestha, up to chair, maintain heels off bed, full liquid diet, shrestha    Subjective   General  Chart Reviewed: Yes  Patient assessed for rehabilitation services?: Yes  Family / Caregiver Present: Yes (brother and friend)  Referring Practitioner: Maxx Yan MD  Diagnosis: Intractable nausea and vomiting  Subjective  Subjective: Pt resting in bed. C/o nausea. Reports abdominal pain but had received pain meds from RN. Objective   Heart Rate: (!) 134  Heart Rate Source: Monitor  BP: (!) 109/76  BP Location: Right upper arm  Patient Position: Semi fowlers  Resp: 16  SpO2: 98 %  O2 Device: Nasal cannula     Safety Devices  Type of Devices: All fall risk precautions in place;Call light within reach; Chair alarm in place;Gait belt;Left in chair;Nurse notified; Patient at risk for falls        ADL  Feeding: Stand by assistance; Beverage management; Increased time to complete  UE Dressing: Maximum assistance  UE Dressing Skilled Clinical Factors: gown  LE Dressing: Dependent/Total  Toileting: Dependent/Total  Toileting Skilled Clinical Factors: shrestha     Activity Tolerance  Activity Tolerance: Patient limited by pain  Bed mobility  Supine to Sit: Maximum assistance;2 Person assistance  Transfers  Stand Pivot Transfers: Moderate assistance;2 Person assistance (B HHA (bed to chair))  Sit to stand: Moderate assistance;2 Person assistance  Stand to sit: Moderate assistance;2 Person assistance  Transfer Comments: Pt followed directions for safe technique. Some impulsivity noted as she was seated EOB. Cognition  Arousal/Alertness: Delayed responses to stimuli  Following Commands:  Follows one step commands with increased time  Attention Span: Attends with cues to redirect  Safety Judgement: Decreased awareness of need for safety  Problem Solving: Decreased awareness of errors  Initiation: Requires cues for some  Sequencing: Requires cues for some  Orientation  Overall Orientation Status: Within Functional Limits          Education Given To: Patient; Family  Education Provided: ADL Adaptive Strategies; Energy Conservation;Plan of Care;Role of Therapy;Transfer Training;Equipment  Education Method: Verbal  Education Outcome: Verbalized understanding;Continued education needed   Disease Specific Education: Pt educated on importance of OOB mobility, prevention of complications of bedrest, and general safety during hospitalization. Pt verbalized understanding    AM-PAC Score      AM-[facility] Inpatient Daily Activity Raw Score: 10 (08/10/22 1358)  AM-PAC Inpatient ADL T-Scale Score : 27.31 (08/10/22 1358)  ADL Inpatient CMS 0-100% Score: 74.7 (08/10/22 1358)  ADL Inpatient CMS G-Code Modifier : CL (08/10/22 [facility]8)  Goals  Short Term Goals  Time Frame for Short term goals: within 7 days by 8/13 unless noted  Short Term Goal 1: Pt will perform LB dressing with CGA-ongoing 8/11  Short Term Goal 2: Pt will perform bathroom mobility with SBA and LRAD-ongoing 8/11  Short Term Goal 3: Pt will perform standing ADLs with SBA and LRAD for >3 minutes-ongoing 8/11  Short Term Goal 4: Pt will perform BUE ther ex x15 for increased functional strength and endurance by 8/11-ongoing 8/10  Patient Goals   Patient goals : to go home     Therapy Time   Individual Concurrent Group Co-treatment   Time In       1046   Time Out       1115   Minutes       29   Timed Code Treatment Minutes: 29 Minutes   If pt is discharged prior to next OT session, this note will serve as the discharge summary.   Magnolia Cabrera OT

## 2022-08-10 NOTE — PROGRESS NOTES
Physical Therapy  Facility/Department: [facility] C3 TELE/MED SURG/ONC  Daily Treatment Note  NAME: Woodrow Mayberry  : 1955  MRN: [id number]    Date of Service: 8/10/2022    Discharge Recommendations:  Subacute/Skilled Nursing Facility   PT Equipment Recommendations  Equipment Needed: No    Patient Diagnosis(es): The primary encounter diagnosis was Urinary tract infection without hematuria, site unspecified. Diagnoses of Abdominal pain with vomiting, Ileus ([address] Malignant ascites, and Pleural effusion on left were also pertinent to this visit. Assessment   Assessment: Pt seen as cotx with OT to progress functional mobility safely and maximize outcomes. Pt requires increased level of assist this date due to abdominal pain; max(A)x2 for bed mobility and mod(A)x2 for SPT from bed to chair. Pt positioned in pillows for comfort. Pt functioning below baseline and would benefit from skilled therapy to address current deficits mentioned above. Activity Tolerance: Patient limited by pain  Equipment Needed: No     Plan    Plan  Plan: 3-5 times per week  Current Treatment Recommendations: Strengthening;ROM;Balance training;Functional mobility training;Transfer training;ADL/Self-care training;IADL training;Neuromuscular re-education;Stair training;Gait training; Endurance training;Patient/Caregiver education & training; Safety education & training;Home exercise program;Pain management; Therapeutic activities     Restrictions  Restrictions/Precautions  Restrictions/Precautions: Modified Diet  Position Activity Restriction  Other position/activity restrictions: up with assist, shrestha, up to chair, maintain heels off bed, full liquid diet     Subjective    Subjective  Subjective: Pt supine in bed upon arrival, brother and friend present. Hesitant to PT tx due to pain. Pt agreeable to sitting EOB. Once sitting EOB, pt wanting to sit in chair.    Pain: 10/10 abdominal pain at rest; RN provided pain meds prior to session Objective   Vitals  /76  SpO2 91% on 3 L    bpm     Bed Mobility Training  Bed Mobility Training: Yes  Overall Level of Assistance: Maximum assistance;Assist X2  Interventions: Verbal cues; Safety awareness training  Supine to Sit: Maximum assistance;Assist X2;Additional time (Increased level o assist due to pain)  Sit to Supine:  (pt up in chair at end of session)  Balance  Sitting: Impaired (CGA for sitting balance due to pain)  Standing: Impaired (mod(A)x2 HHA)  Transfer Training  Transfer Training: Yes  Overall Level of Assistance: Moderate assistance;Assist X2;Additional time  Interventions: Verbal cues; Safety awareness training  Sit to Stand: Moderate assistance;Assist X2;Additional time  Stand to Sit: Moderate assistance;Assist X2;Additional time  Stand Pivot Transfers: Moderate assistance;Assist X2;Additional time (HHA)     PT Exercises  Exercise Treatment: Ankle pumps 2x15, quad sets x5  Breathing Techniques: Diaphragmatic breathing x10 reps supine, x10 seated, x10 reps reclined in chair             Goals  Short Term Goals  Time Frame for Short term goals: 1 week, 8/13/22  Short term goal 1: Pt will perform bed mobility w/ Stewart--8/10 progressing  Short term goal 2: Pt will perform sit to stand w/ SBA and LRAD--8/10 progressing  Short term goal 3: Pt will perform amb for 100 ft w/ LRAD and SBA w/o LOB--8/10 progressing  Short term goal 4: Pt will perform 12-15 reps of BLE exercises to improve function towards goals by 8/9--8/10 progressing  Patient Goals   Patient goals : \"to go home\"    Education  Patient Education  Education Given To: Patient; Family  Education Provided: Role of Therapy;Plan of Care;Home Exercise Program  Education Provided Comments: Max education on importance of continued PT and OOB mobility;  Education Method: Verbal  Barriers to Learning:  (Pain)  Education Outcome: Continued education needed; Unable to verbalize; Unable to demonstrate understanding    Therapy Time Individual Concurrent Group Co-treatment   Time In 1045         Time Out 1115         Minutes 30         Timed Code Treatment Minutes: 30 Minutes     If pt is unable to be seen after this session, please let this note serve as discharge summary. Please see case management note for discharge disposition. Thank you.     Sole Said, PT

## 2022-08-10 NOTE — DISCHARGE SUMMARY
was well-tolerated and started to improve slowly. In the morning of the day of death, patient was more alert and less symptomatic and asked to eat something more than clear liquids. This was allowed. Shortly before noon, day, patient suddenly became weak and hypotensive and tachycardic. Family members agreed to comfort care, however patient, being fully oriented, wanted to have active interventions and to be full code. Patient was transferred to ICU. Started on pressors and evaluated by critical care. In the ICU, upon quick deterioration of the patient, patient's family members decided on DNR and comfort care. Patient  On August 10, 2022, 1458. Family members aware. Consults:  IP CONSULT TO HOSPITALIST  IP CONSULT TO ONCOLOGY  IP CONSULT TO PALLIATIVE CARE  IP CONSULT TO CASE MANAGEMENT  IP CONSULT TO SPIRITUAL SERVICES  IP CONSULT TO CRITICAL CARE    Signed:  Manuel Swan MD MD   8/10/2022    Thank you Yamilka Bolaños MD for the opportunity to be involved in this patient's care. If you have any questions or concerns, please feel free to contact me at [phone].

## 2022-08-10 NOTE — ONCOLOGY
ONCOLOGY HEMATOLOGY CARE PROGRESS NOTE      SUBJECTIVE:     Afebrile and on 3 LPM by NC. Had a 6 L paracentesis. Feels better. ROS:   The remaining 10 point review of symptoms is unremarkable. OBJECTIVE        Physical    VITALS:  /74   Pulse 92   Temp 97.8 °F (36.6 °C) (Oral)   Resp 18   Ht 5' 4\" (1.626 m)   Wt 190 lb (86.2 kg)   LMP  (LMP Unknown)   SpO2 97%   BMI 32.61 kg/m²   TEMPERATURE:  Current - Temp: 97.8 °F (36.6 °C); Max - Temp  Av.1 °F (36.7 °C)  Min: 97.8 °F (36.6 °C)  Max: 98.2 °F (36.8 °C)  PULSE OXIMETRY RANGE: SpO2  Av.8 %  Min: 95 %  Max: 99 %  24HR INTAKE/OUTPUT:    Intake/Output Summary (Last 24 hours) at 8/10/2022 0755  Last data filed at 8/10/2022 9689  Gross per 24 hour   Intake 2188.93 ml   Output 450 ml   Net 1738.93 ml         CONSTITUTIONAL:  awake, alert, cooperative, no apparent distress, HEENT oral pharynx , no scleral icterus  HEMATOLOGIC/LYMPHATICS:  no cervical lymphadenopathy, no supraclavicular lymphadenopathy, no axillary lymphadenopathy and no inguinal lymphadenopathy  LUNGS:  No increased work of breathing, good air exchange, clear to auscultation bilaterally, no crackles or wheezing  CARDIOVASCULAR:  , regular rate and rhythm, normal S1 and S2, no S3 or S4, and no murmur noted  ABDOMEN:  No scars, normal bowel sounds, soft, non-distended, non-tender, no masses palpated, no hepatosplenomegally  MUSCULOSKELETAL:  There is no redness, warmth, or swelling of the joints. EXTREMETIES: No clubbing cynosis or edema  NEUROLOGIC:  Awake, alert, oriented to name, place and time. Cranial nerves II-XII are grossly intact. Motor is 5 out of 5 bilaterally.    SKIN:  no bruising or bleeding      Data      Recent Labs     22  0500 22  0610 08/10/22  0529   WBC 7.8 9.6 10.4   HGB 9.2* 10.2* 10.1*   HCT 28.3* 30.5* 30.5*   * 133* 100*   MCV 84.1 83.2 83.7          Recent Labs     22  0500 22  0610 08/10/22  0529   * 134* 136   K 3.6 3.7 3.7    102 104   CO2 25 23 23   BUN 16 14 17   CREATININE <0.5* <0.5* <0.5*       Recent Labs     08/09/22  0610 08/10/22  0529   AST 21 18   ALT 12 10   BILITOT 0.6 0.5   ALKPHOS 79 71         Magnesium:    Lab Results   Component Value Date/Time    MG 2.10 07/07/2022 09:28 AM         Problem List  Patient Active Problem List   Diagnosis    HTN (hypertension)    HLD (hyperlipidemia)    Low back pain    Benign neoplasm of colon    Swelling, mass, or lump in head and neck    Chronic sinusitis    BERNIE (generalized anxiety disorder)    Major depressive disorder with single episode, in full remission (HCC)    Generalized abdominal pain    Adnexal mass    Peritoneal carcinomatosis (HCC)    Malignant ascites    Poor appetite    Reactive depression    Acute pulmonary embolism (HCC)    Acute deep vein thrombosis (DVT) of proximal vein of both lower extremities (HCC)    Hyponatremia    Normocytic normochromic anemia    Ovarian cancer (HCC)    Intractable nausea and vomiting    Hypokalemia    Acute cystitis without hematuria    Personal history of DVT (deep vein thrombosis)    Personal history of pulmonary embolism       ASSESSMENT AND PLAN  1.) Probable ovarian cancer  - CA-125 18279 U/mL, 7/20/2022.  - CT abdomen/pelvis, 7/19/2022, showed abdominoperitoneal carcinomatosis with irregular/nodular ovaries bilaterally. Moderate ascites was present. - CT-guided mesenteric biopsy, 7/22/2022. 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 - US abdomen, 7/25/2022, showed too little ascites for paracentesis. - Left brachial vein PICC placed, 7/25/2022. - Carbo/Taxol #1 given on 7/26/2022. Due again on 8/16/2022. - A CT on 8/05/2022 showed improved peritoneal carcinomatosis at this early date. - US-guided paracentesis, 8/08/2022, yielded 6.2 L of fluid.      2.) PE  - CTPA, 7/19/2022, showed bilateral PEs left greater than right.  - Duplex ultrasound of the BLE, 7/19/2022, showed SVT of right greater saphenous vein extending to 1.15 cm from SFJ. DVT of left distal femoral, popliteal, gastrocnemius, posterior tibial, peroneal veins.  - IVC filter placed, 7/20/2022, with Dr. Emily Farias.  - Treating with Eliquis, but it is on hold for a port later this week. 3.) UTI  - Ceftriaxone started, 8/10/2022     4.) Nausea/anorexia  - Megace  - Zofran  - Reportedly too little ascites for paracentesis on 7/25/2022. Can always revisit that on Monday. - Ultrasound-guided paracentesis, 8/08/2022,  yielded 6.2 L of fluid. ONCOLOGIC DISPOSITION:  Okay for discharge and follow up in the office for cycle #2 next week.     Terri Mcwilliams MD  Please contact through [address]

## 2022-08-10 NOTE — PLAN OF CARE
Problem: Pain  Goal: Verbalizes/displays adequate comfort level or baseline comfort level  Outcome: Progressing    Problem: Skin/Tissue Integrity  Goal: Absence of new skin breakdown  Outcome: Progressing

## 2022-08-10 NOTE — PROGRESS NOTES
Therapy got pt up to chair. Pt in discomfort. Increased abdominal distention and increased abdominal pain. Secure message sent to Dr. Alisson Jaquez \"Pt stated stomach pain is getting worse. Rating 10/10. Very uncomfortable, moaning and groaning. IV diluadid not due until 1320 abd PO morphine not due until 1420. Are we able to adjust pain medication? \" See new orders. Writer tried to obtain a BP before giving Dilaudid. Could not obtain a BP, machine would not read. Charge nurse called. Pt transferred to bed and rapid response called.

## 2022-08-10 NOTE — PROGRESS NOTES
Hospitalist Progress Note      PCP: Yamilka Bolaños MD    Date of Admission: 8/5/2022    Chief Complaint: Abdominal pain    Hospital Course: Admitted with lower abdominal pain and intractable nausea and vomiting. Diagnosed with acute cystitis. Also noted to have ascites which is getting worse. Recently diagnosed with intra-abdominal malignancy of unknown primary but suspected ovarian tumor. Patient had paracentesis which helped with abdominal pain. Still feels weak and remains edematous. Port-A-Cath that was previously scheduled is currently canceled. Cleared by oncology for discharge. Considering postacute rehab. Subjective: No chest pain, no shortness of breath, mild nausea, no vomiting. Patient's abdominal pain is better after paracentesis, but not fully resolved.        Medications:  Reviewed    Infusion Medications    sodium chloride      sodium chloride 75 mL/hr at 08/10/22 0709     Scheduled Medications    sodium chloride  1 g Oral BID WC    sertraline  25 mg Oral Daily    pantoprazole  40 mg Oral QAM AC    midodrine  10 mg Oral TID WC    megestrol  400 mg Oral Daily    atorvastatin  10 mg Oral Daily    fluticasone  1 spray Each Nostril Daily    aspirin  81 mg Oral Daily    apixaban  5 mg Oral BID    cefTRIAXone (ROCEPHIN) IV  1,000 mg IntraVENous Q24H    sodium chloride flush  10 mL IntraVENous 2 times per day     PRN Meds: HYDROmorphone, morphine, sodium chloride flush, sodium chloride, ondansetron, polyethylene glycol, acetaminophen **OR** acetaminophen      Intake/Output Summary (Last 24 hours) at 8/10/2022 0943  Last data filed at 8/10/2022 1137  Gross per 24 hour   Intake 2188.93 ml   Output 450 ml   Net 1738.93 ml         Physical Exam Performed:    /76   Pulse 100   Temp 97.8 °F (36.6 °C)   Resp 16   Ht 5' 4\" (1.626 m)   Wt 190 lb (86.2 kg)   LMP  (LMP Unknown)   SpO2 98%   BMI 32.61 kg/m²     General appearance: No apparent distress, appears stated age and cooperative. HEENT: Pupils equal, round, and reactive to light. Conjunctivae/corneas clear. Neck: Supple, with full range of motion. No jugular venous distention. Trachea midline. Respiratory:  Normal respiratory effort. Clear to auscultation, bilaterally without Rales/Wheezes/Rhonchi. Cardiovascular: Regular rate and rhythm with normal S1/S2 without murmurs, rubs or gallops. Abdomen: Slight distention, mild tenderness. No significant change from yesterday. Musculoskeletal: No clubbing, cyanosis or edema bilaterally. Full range of motion without deformity. Skin: Skin color, texture, turgor normal.  No rashes or lesions. Neurologic:  Neurovascularly intact without any focal sensory/motor deficits. Cranial nerves: II-XII intact, grossly non-focal.  Psychiatric: Alert and oriented, thought content appropriate, normal insight  Capillary Refill: Brisk,3 seconds, normal   Peripheral Pulses: +2 palpable, equal bilaterally     I examined the patient today (08/10/22). Physical exam is similar to yesterday (8/9)    Labs:   Recent Labs     08/08/22  0500 08/09/22  0610 08/10/22  0529   WBC 7.8 9.6 10.4   HGB 9.2* 10.2* 10.1*   HCT 28.3* 30.5* 30.5*   * 133* 100*       Recent Labs     08/08/22  0500 08/09/22  0610 08/10/22  0529   * 134* 136   K 3.6 3.7 3.7    102 104   CO2 25 23 23   BUN 16 14 17   CREATININE <0.5* <0.5* <0.5*   CALCIUM 8.8 7.9* 7.9*       Recent Labs     08/09/22  0610 08/10/22  0529   AST 21 18   ALT 12 10   BILITOT 0.6 0.5   ALKPHOS 79 71       Recent Labs     08/08/22  0950   INR 1.67*       No results for input(s): CKTOTAL, TROPONINI in the last 72 hours.     Urinalysis:      Lab Results   Component Value Date/Time    NITRU POSITIVE 08/05/2022 07:45 PM    WBCUA 0-2 08/05/2022 07:45 PM    BACTERIA 2+ 08/05/2022 07:45 PM    RBCUA 0-2 08/05/2022 07:45 PM    BLOODU Negative 08/05/2022 07:45 PM    SPECGRAV 1.025 08/05/2022 07:45 PM    GLUCOSEU Negative 08/05/2022 07:45 PM Radiology:  US GUIDED PARACENTESIS   Final Result   Status post successful ultrasound-guided therapeutic paracentesis. CT ABDOMEN PELVIS W IV CONTRAST Additional Contrast? None   Final Result   Right lower lobe pulmonary emboli unchanged. Increased left lower lobe pulmonary consolidation. Moderate left effusion   unchanged. Moderate ascites unchanged. Peritoneal carcinomatosis appears somewhat   improved. Ileus. XR CHEST PORTABLE   Final Result   No acute abnormality allowing for patient rotation. Assessment/Plan:    Active Hospital Problems    Diagnosis     Hypokalemia [E87.6]      Priority: Medium    Acute cystitis without hematuria [N30.00]      Priority: Medium    Personal history of DVT (deep vein thrombosis) [Z86.718]      Priority: Medium    Personal history of pulmonary embolism [Z86.711]      Priority: Medium    Intractable nausea and vomiting [R11.2]      Priority: Medium    Ovarian cancer ([address] [C56.9]      Priority: Medium    Generalized abdominal pain [R10.84]      Priority: Medium    Peritoneal carcinomatosis ([address] [C78.6]      Priority: Medium    Poor appetite [R63.0]      Priority: Medium    HTN (hypertension) [I10]     HLD (hyperlipidemia) [E78.5]      PLAN:    Progressive ascites  Suspected malignant but not confirmed. Had paracentesis. Abdominal pain is stable. No new issues    Suspected ovarian cancer  Diagnosis made by imaging and CT-guided mesenteric biopsy in July. However, the exact primary has not yet been established. Clinical appearance is consistent with adnexal origin. Being followed by oncology. No new issues overnight    Acute pulmonary embolism  Eliquis resumed given paracentesis done and Port-A-Cath not planned yet. UTI  Ceftriaxone course completed today    Nausea and vomiting  Better today. Will advance diet to full liquids. Discussed with the patient and family.   Questions answered    Referral written to case management for SNF    DVT Prophylaxis: Eliquis   Diet: ADULT ORAL NUTRITION SUPPLEMENT; Breakfast, Dinner; Standard High Calorie/High Protein Oral Supplement  ADULT DIET;  Full Liquid  Code Status: Full Code    PT/OT Eval Status: Ordered    Dispo - SNF referral.       Marlin Phillips MD

## 2022-08-10 NOTE — CARE COORDINATION
Patient transferred to ICU after rapid called. Spoke with Spiritual Care who states patient was a DNR-CC but just told staff that she wanted everything done and wants to change her code status. Per previous CM notes, patient is active with [facility] and plan was to resume. Will continue to follow. 1811 addendum: Family at bedside talking with MD and NP. Family would like to change goals of care to comfort care. They are saying their goodbyes and withdrawing care.

## 2022-08-10 NOTE — ACP (ADVANCE CARE PLANNING)
Advance Care Planning     General Advance Care Planning (ACP) Conversation    Date of Conversation: 8/5/2022  Conducted with: Patient with Decision Making Capacity    Healthcare Decision Maker:    Primary Decision Maker: Dahlia Barclay - Brother/Sister - [phone]    Primary Decision Maker: Gin Paulino - Niece/Nephew - [phone]    Secondary Decision Maker: Beba Yee - Brother/Sister - [phone]  Click here to complete Healthcare Decision Makers including selection of the Healthcare Decision Maker Relationship (ie \"Primary\"). Today we documented Decision Maker(s) consistent with ACP documents on file. Content/Action Overview: Has ACP document(s) NOT on file - requested patient to provide  Reviewed DNR/DNI and patient elects Full Code (Attempt Resuscitation)  treatment goals, benefit/burden of treatment options, artificial nutrition, ventilation preferences, hospitalization preferences, resuscitation preferences, end of life care preferences (vegetative state/imminent death), and hospice care       Patient states her niece Roula Claros is her HCPOA #1, followed by the #2 HCPOA, brother Aris Palma. They brought in this HCPOA document and living will at the beginning of the week and gave it to the nurse Abdirahman Martinez) to scan into the chart  (I will attempt to locate). Patient initially stated to me that she was \"DNR\". When we discussed further, she specified she should be a full code until she is given a terminal diagnosis, at which time she would intend to change code status to DNR. Verified she is okay with chest compressions and intubation, but would not want long term life support. Would never want a permanent feeding tube, but would be agreeable to temporary artificial nutrition if needed. Okay with SNF. Would be okay if she had to live the rest of her life in a nursing home as long as she was able to interact with her family, and particularly niece's two daughters.   Stephanie Veras is an RN at [facility].     Length of Voluntary ACP Conversation in minutes:  <16 minutes (Non-Billable)    Gadiel Blue, APRN - CNP

## 2022-08-10 NOTE — PROGRESS NOTES
VS and Shift assessment completed and charted. Flagging for sepsis protocol. Rocephin currently running. F/C patent, draining cloudy kamila urine 100ml since 230pm. Secure message sent to on call, Dr. Dickie Canavan, with an update. Medicated with zofran IV for nausea and morphine IR for abdominal cramping. Denies any further needs at this time.      Dr. Dickie Canavan responded with no new orders

## 2022-08-10 NOTE — CONSULTS
INPATIENT PULMONARY CRITICAL CARE CONSULT NOTE      Chief Complaint/Referring Provider:  Patient is being seen at the request of Dr. Pranav Armas  for a consultation for shock      Presenting HPI: Patient admitted to hospital because of nausea vomiting and abdominal pain    As per the admitting provider-Pt is an 79y.o. year-old female with a history of hypertension, hyperlipidemia, ovarian cancer with peritoneal carcinomatosis and has a h/o DVT and PE for which she takes Eliquis. She presents to the emergency room for evaluation following a 1 week history of loss of appetite, and recurrent episodes of nausea, vomiting and ongoing abdominal pain. She does report having a bowel movement earlier today. Her last chemotherapy was while she was in the hospital before being discharged. In the emergency room she was found to have a urinary tract infection, stable ascites, and ileus. She is being admitted for further evaluation and treatment. Associated signs and symptoms do not include fever or chills, hemoptysis, hematochezia, diarrhea, constipation or urinary symptoms.      Patient's clinical status deteriorated this morning and patient became profoundly hypotensive and Patient's systolic blood pressure was 62 mmHg and a rapid response act was called and patient has metastatic carcinoma and there was initially a thought process from patient's family about current contemplating comfort care but subsequently patient woke up and was very competent to make a decision and wanted to proceed with full code, patient continued to be hypotensive along with that patient was having profound tachycardia along with that patient also was having increasing abdominal girth and patient was not distressed along with that patient was also having increased shortness of breath with increased work of breathing and given the deteriorating clinical status and patient's decision about everything to be done for her, patient was transferred to the ICU for further management  Patient continues to be symptomatic, patient was continued to be having increased work of breathing with intermittent paradoxical breathing, patient was started on IV pressors to maintain hemodynamics, patient was showing sinus tachycardia, patient was having difficulty in completing the whole sentence of words because of shortness of breath, patient was also in pain as stated above, patient has had no urine output overnight, patient has a cumulative fluid balance of +4 L, patient's glycemic control was acceptable, no other pertinent review of system of concern      Patient Active Problem List    Diagnosis Date Noted    Acute respiratory failure with hypoxemia ([address] 08/10/2022    SOB (shortness of breath) 08/10/2022    Shock circulatory ([address] 08/10/2022    Lactic acidosis 08/10/2022    Right ventricular systolic dysfunction 16/98/3246    Pulmonary HTN ([address] 08/10/2022    Severe protein-calorie malnutrition ([address] 08/10/2022    Hyperuricemia 08/10/2022    Thrombocytopenia ([address] 08/10/2022    Hypokalemia 08/06/2022    Acute cystitis without hematuria 08/06/2022    Personal history of DVT (deep vein thrombosis) 08/06/2022    Personal history of pulmonary embolism 08/06/2022    Intractable nausea and vomiting 08/05/2022    Hyponatremia 07/25/2022    Normocytic normochromic anemia 07/25/2022    Ovarian cancer ([address] 07/25/2022    Acute deep vein thrombosis (DVT) of proximal vein of both lower extremities ([address] 07/20/2022    Acute pulmonary embolism ([address] 07/19/2022    Generalized abdominal pain 07/14/2022    Adnexal mass 07/14/2022    Peritoneal carcinomatosis ([address] 07/14/2022    Malignant ascites 07/14/2022    Poor appetite 07/14/2022    Reactive depression 07/14/2022    BERNIE (generalized anxiety disorder) 08/01/2018    Major depressive disorder with single episode, in full remission ([address] 08/01/2018    Benign neoplasm of colon 09/08/2014    HTN (hypertension) 05/03/2013    HLD normal heart sounds. No right ventricular heave. No lower extremity edema. Pulmonary/Chest: No wheezes. Bilateral rales. Chest wall is not dull to percussion. Significant accessory muscle usage, no stridor. Decreased breath sound density  Abdominal: Firm with significant distention with tenderness  Musculoskeletal: No cyanosis. No clubbing. No obvious joint deformity. Lymphadenopathy: No cervical or supraclavicular adenopathy. Skin: Skin is warm and dry. No rash or nodules on the exposed extremities. Psychiatric: In pain and anxious  Neurologic: Alert, awake and oriented. No focal cranial nerve deficit        Results:  CBC:   Recent Labs     08/08/22  0500 08/09/22  0610 08/10/22  0529   WBC 7.8 9.6 10.4   HGB 9.2* 10.2* 10.1*   HCT 28.3* 30.5* 30.5*   MCV 84.1 83.2 83.7   * 133* 100*     BMP:   Recent Labs     08/08/22  0500 08/09/22  0610 08/10/22  0529   * 134* 136   K 3.6 3.7 3.7    102 104   CO2 25 23 23   BUN 16 14 17   CREATININE <0.5* <0.5* <0.5*     LIVER PROFILE:   Recent Labs     08/09/22  0610 08/10/22  0529   AST 21 18   ALT 12 10   BILITOT 0.6 0.5   ALKPHOS 79 71     PT/INR:   Recent Labs     08/08/22  0950   PROTIME 19.5*   INR 1.67*         Imaging:  I have reviewed radiology images personally. XR ABDOMEN FOR NG/OG/NE TUBE PLACEMENT   Final Result   Enteric catheter in appropriate position with tip in the gastric body. Central small bowel distension, possibly ileus versus obstruction. US GUIDED PARACENTESIS   Final Result   Status post successful ultrasound-guided therapeutic paracentesis. CT ABDOMEN PELVIS W IV CONTRAST Additional Contrast? None   Final Result   Right lower lobe pulmonary emboli unchanged. Increased left lower lobe pulmonary consolidation. Moderate left effusion   unchanged. Moderate ascites unchanged. Peritoneal carcinomatosis appears somewhat   improved. Ileus.          XR CHEST PORTABLE   Final Result   No acute abnormality allowing for patient rotation. XR ABDOMEN (KUB) (SINGLE AP VIEW)    (Results Pending)   CT ABDOMEN PELVIS WO CONTRAST Additional Contrast? None    (Results Pending)       CT ABDOMEN PELVIS W IV CONTRAST Additional Contrast? None    Result Date: 7/19/2022  EXAMINATION: CT OF THE ABDOMEN AND PELVIS WITH CONTRAST; CTA OF THE CHEST 7/19/2022 8:06 am TECHNIQUE: CT of the abdomen and pelvis was performed with the administration of intravenous contrast. Multiplanar reformatted images are provided for review. Automated exposure control, iterative reconstruction, and/or weight based adjustment of the mA/kV was utilized to reduce the radiation dose to as low as reasonably achievable.; CTA of the chest was performed after the administration of intravenous contrast.  Multiplanar reformatted images are provided for review. MIP images are provided for review. Automated exposure control, iterative reconstruction, and/or weight based adjustment of the mA/kV was utilized to reduce the radiation dose to as low as reasonably achievable. COMPARISON: None 07/10/2022 HISTORY: ORDERING SYSTEM PROVIDED HISTORY: pain TECHNOLOGIST PROVIDED HISTORY: Additional Contrast?->None Reason for exam:->pain Decision Support Exception - unselect if not a suspected or confirmed emergency medical condition->Emergency Medical Condition (MA) Reason for Exam: abd pain, diarrhea x 1 week; ORDERING SYSTEM PROVIDED HISTORY: SOB, CA TECHNOLOGIST PROVIDED HISTORY: Reason for exam:->SOB, CA Decision Support Exception - unselect if not a suspected or confirmed emergency medical condition->Emergency Medical Condition (MA) Reason for Exam: sob x 3 days Relevant Medical/Surgical History: smoked x many years. Quit in 30's FINDINGS: Chest: Mediastinum: Thyroid gland is unremarkable. No intimal flap is seen in aorta. Pulmonary embolus is seen in the distal left main pulmonary artery, extending into the left upper left lower lobe branches. suggesting a component of right heart insufficiency. Results discussed with [address] [place] [address] by Garrett Shepard. Bushra Brown MD at 8:52 Am on 7/19/2022 Abdomen and pelvis: Abdominal and pelvic ascites with findings of peritoneal carcinomatosis, presumably due to ovarian carcinoma due to the nodular and irregular appearance of the ovaries. Peritoneal biopsy:      - Carcinoma, compatible with mullerian primary- See Comment. COMMENT:  The tumor cells are positive for Cytokeratin AE1/3C, PAX8, and   WT1-1. P53 is negative. 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 Latest Reference Range & Units 8/10/22 13:43   Lactic Acid 0.4 - 2.0 mmol/L 9.9 ()       Echocardiogram: Summary   Vigorous left ventricular systolic function with ejection fraction of >65%. No regional wall motion abnormalites are seen. The right sided chambers are enlarged. Right ventricular systolic function   is mild to moderately reduced . Systolic pulmonic artery pressure (SPAP) is estimated at 48 mmHg consistent   with mild pulmonary hypertension (Right atrial pressure of 8 mmHg). Assessment:  Principal Problem:    Intractable nausea and vomiting  Active Problems:    Generalized abdominal pain    Peritoneal carcinomatosis (HCC)    Poor appetite    Ovarian cancer (HCC)    Hypokalemia    Acute cystitis without hematuria    Personal history of DVT (deep vein thrombosis)    Personal history of pulmonary embolism    Acute respiratory failure with hypoxemia (HCC)    SOB (shortness of breath)    Shock circulatory (HCC)    Lactic acidosis    Right ventricular systolic dysfunction    Pulmonary HTN (HCC)    Severe protein-calorie malnutrition (HCC)    Hyperuricemia    Thrombocytopenia (HCC)    HTN (hypertension)    HLD (hyperlipidemia)  Resolved Problems:    * No resolved hospital problems.  *          Plan:   Oxygen supplementation to keep saturation between 90 and 94% only  Please titrate the oxygen as per the above parameters  Patient has a significantly increased work of breathing which may be secondary to patient having increasing abdominal girth along with that patient had use of accessory muscles of respiration and if patient's respite status worsens then patient may require intubation and mechanical vent to support  Patient was started on IV pressors to keep mean arterial pressure more   Lactic acid levels were sent on a stat basis which are elevated and 9.9 suggestive of hypoperfusion or ischemia of the gut  Patient is somewhat unstable to go for a CT of the abdomen at this time to assess for any perforation  Patient has some bandemia along with slight elevation of procalcitonin level-we will hold off on any antibiotics  Patient has a significant ovarian cancer with peritoneal carcinomatosis as underlying issue  Patient to be given analgesia on as needed basis  Monitor for any hypoventilation and hypercarbia  Patient urine output is on the lower side which needs to be trended and if worsening patient may require RRT  Monitor input output and BMP  Correct electrolytes on whenever necessary basis  PUD and DVT prophylaxis    Case discussed with floor nursing/IM/patient    Patient's clinical status and prognosis discussed in detail with patient's family and Naveen Joseph who were told that cannot do aggressive measures as being done above and if required patient can be secondary to intubation mechanical support or RRT but it may not change patient's overall clinical prognosis-patient's family/DPOA  to decide upon course of care    Critical care time spent with patient was 35 minutes exclusive of any procedures    Patient's overall prognosis is guarded and there is a high chance of nonsurvival      Electronically signed by:  Aron Pearson MD    8/10/2022    2:26 PM.

## 2022-08-10 NOTE — FLOWSHEET NOTE
EOL care, visited with family at rapid response and transition to ICU. Bedside after pt passing, prayer and prayer blanket provided for family. Frank landis was called, voicemail left. Family wishes for  to pray at bedside if possible. Family Still deciding on  home, sharing stories. Javier Dewey      Thank you for consulting Spiritual Care    If you need a  for emotional, spiritual or comfort care,   -or- assistance with [address] [facility] or Living Will forms,  please dial \"0\" and ask for the Juan Odonnell on-call to be paged.     You may also call us directly:  [phone]  [phone] ([phone]) [facility]  [phone]) Outpatient

## 2022-08-10 NOTE — DISCHARGE INSTR - COC
Continuity of Care Form    Patient Name: Kameron Torres   :  1955  MRN:  [id number]    Admit date:  2022  Discharge date:  ***    Code Status Order: Full Code   Advance Directives:     Admitting Physician:  Jacinto Suh MD  PCP: Kirstin Green MD    Discharging Nurse: [facility] Unit/Room#: 2175/6977-97  Discharging Unit Phone Number: ***    Emergency Contact:   Extended Emergency Contact Information  Primary Emergency Contact: Dahlia Barclay  [place] Phone: [phone]  Mobile Phone: [phone]  Relation: Brother/Sister  Secondary Emergency Contact: Honey [address] Phone: [phone]  Mobile Phone: [phone]  Relation: Brother/Sister    Past Surgical History:  Past Surgical History:   Procedure Laterality Date    COLONOSCOPY  2014    colon polyp removed, diverticulosis    CT BIOPSY ABDOMEN RETROPERITONEUM  2022    CT BIOPSY ABDOMEN RETROPERITONEUM 2022 Lennette Gilford, MD [facility] CT SCAN    IVC FILTER INSERTION  2022       Immunization History:   Immunization History   Administered Date(s) Administered    Influenza Virus Vaccine 10/20/2020    Influenza, Intradermal, Quadrivalent, Preservative Free 2017    Influenza, Quadv, IM, PF (6 mo and older Fluzone, Flulaval, Fluarix, and 3 yrs and older Afluria) 2019    Influenza, Rejeana Fort, Recombinant, IM PF (Flublok 18 yrs and older) 2018    Pneumococcal Conjugate 13-valent (Lucius Hedge) 2020    Tdap (Boostrix, Adacel) 2013    Zoster Live (Zostavax) 2016       Active Problems:  Patient Active Problem List   Diagnosis Code    HTN (hypertension) I10    HLD (hyperlipidemia) E78.5    Low back pain M54.50    Benign neoplasm of colon D12.6    Swelling, mass, or lump in head and neck R22.0, R22.1    Chronic sinusitis J32.9    BERNIE (generalized anxiety disorder) F41.1    Major depressive disorder with single episode, in full remission ([facility] F32.5    Generalized abdominal pain R10.84    Adnexal mass N94.89    Peritoneal carcinomatosis (HCC) C78.6    Malignant ascites R18.0    Poor appetite R63.0    Reactive depression F32.9    Acute pulmonary embolism (HCC) I26.99    Acute deep vein thrombosis (DVT) of proximal vein of both lower extremities (HCC) I82.4Y3    Hyponatremia E87.1    Normocytic normochromic anemia D64.9    Ovarian cancer (HCC) C56.9    Intractable nausea and vomiting R11.2    Hypokalemia E87.6    Acute cystitis without hematuria N30.00    Personal history of DVT (deep vein thrombosis) Z86.718    Personal history of pulmonary embolism Z86.711       Isolation/Infection:   Isolation            No Isolation          Patient Infection Status       None to display            Nurse Assessment:  Last Vital Signs: BP (!) 67/56   Pulse (!) 156   Temp 97.8 °F (36.6 °C)   Resp 16   Ht 5' 4\" (1.626 m)   Wt 190 lb (86.2 kg)   LMP  (LMP Unknown)   SpO2 98%   BMI 32.61 kg/m²     Last documented pain score (0-10 scale): Pain Level: 1  Last Weight:   Wt Readings from Last 1 Encounters:   08/05/22 190 lb (86.2 kg)     Mental Status:  {IP PT MENTAL STATUS:20030}    IV Access:  { FERNANDA IV ACCESS:487215292}    Nursing Mobility/ADLs:  Walking   {P DME JAKC:052926769}  Transfer  {P DME KPNB:372130628}  Bathing  {P DME ZXNX:474986546}  Dressing  {[facility] DME VYRW:859786140}  Toileting  {P DME CHSD:715834651}  Feeding  {P DME DEHS:155602186}  Med Admin  {P DME CQXL:880343958}  Med Delivery   { FERNANDA MED Delivery:284337446}    Wound Care Documentation and Therapy:        Elimination:  Continence:    Bowel: {YES / XO:79963}  Bladder: {YES / AE:39553}  Urinary Catheter: {Urinary Catheter:895287668}   Colostomy/Ileostomy/Ileal Conduit: {YES / LL:05725}       Date of Last BM: ***    Intake/Output Summary (Last 24 hours) at 8/10/2022 1400  Last data filed at 8/10/2022 1330  Gross per 24 hour   Intake 1239.07 ml   Output 475 ml   Net 764.07 ml     I/O last 3 completed shifts: In: 3607.6 [P.O.:700;  I.V.:2808; IV Piggyback:99.6]  Out: 750 [Urine:750]    Safety Concerns:     [address] Safety Concerns:576918245}    Impairments/Disabilities:      [address] [facility] Impairments/Disabilities:821769336}    Nutrition Therapy:  Current Nutrition Therapy:   [address] [facility] Diet List:186649853}    Routes of Feeding: {CHP DME Other Feedings:243213569}  Liquids: {Slp liquid thickness:85351}  Daily Fluid Restriction: {CHP DME Yes amt example:852189164}  Last Modified Barium Swallow with Video (Video Swallowing Test): {Done Not Done GKOH:140704582}    Treatments at the Time of Hospital Discharge:   Respiratory Treatments: ***  Oxygen Therapy:  {Therapy; copd oxygen:40396}  Ventilator:    {MH CC Vent LWXW:054732486}    Rehab Therapies: {THERAPEUTIC INTERVENTION:9232930975}  Weight Bearing Status/Restrictions: [facility] Weight Bearin}  Other Medical Equipment (for information only, NOT a DME order):  {EQUIPMENT:734220317}  Other Treatments: ***    Patient's personal belongings (please select all that are sent with patient):  {[facility] DME Belongings:832447727}    RN SIGNATURE:  {Esignature:812143476}    CASE MANAGEMENT/SOCIAL WORK SECTION    Inpatient Status Date: ***    Readmission Risk Assessment Score:  Readmission Risk              Risk of Unplanned Readmission:  [id number]           Discharging to Facility/ Agency   Name:   Address:  Phone:  Fax:    Dialysis Facility (if applicable)   Name:  Address:  Dialysis Schedule:  Phone:  Fax:    / signature: {Esignature:266797909}    PHYSICIAN SECTION    Prognosis: {Prognosis:1737288475}    Condition at Discharge: [address] Patient Condition:139307061}    Rehab Potential (if transferring to Rehab): {Prognosis:2998738321}    Recommended Labs or Other Treatments After Discharge: ***  Recommended Follow-up, Labs or Other Treatments After Discharge:    ***             Physician Certification: I certify the above information and transfer of Aristides Pope  is necessary for the continuing treatment of the diagnosis listed and that she requires {Admit to Appropriate Level of Care:42183} for {GREATER/LESS:405698391} 30 days.      Update Admission H&P: [facility]    PHYSICIAN SIGNATURE:  {Esignature:242728929}

## 2022-08-10 NOTE — PROGRESS NOTES
Error in [address]:    0.5 mg Dilaudid was given to pt at 0920. David Mccallum RN wasted 1.5 mg Dilaudid with Per Addison RN. One time dose of Dilaudid ordered by Dr. Alisson Jaquez was never given by David Mccallum RN. David Mccallum RN and Per Addison RN wasted whole 2 mg vial of Dilaudid.

## 2022-08-10 NOTE — PROGRESS NOTES
Occupational Therapy/Physical Therapy  Pt transferred to ICU. OT/PT will sign off.   Karthik Ramos OT  Charlene Singer PT

## 2022-08-11 LAB
EKG ATRIAL RATE: 149 BPM
EKG DIAGNOSIS: NORMAL
EKG P AXIS: 46 DEGREES
EKG P-R INTERVAL: 126 MS
EKG Q-T INTERVAL: 258 MS
EKG QRS DURATION: 54 MS
EKG QTC CALCULATION (BAZETT): 406 MS
EKG R AXIS: 1 DEGREES
EKG T AXIS: 54 DEGREES
EKG VENTRICULAR RATE: 149 BPM

## 2022-08-11 PROCEDURE — 93010 ELECTROCARDIOGRAM REPORT: CPT | Performed by: INTERNAL MEDICINE
